# Patient Record
Sex: FEMALE | Race: WHITE
[De-identification: names, ages, dates, MRNs, and addresses within clinical notes are randomized per-mention and may not be internally consistent; named-entity substitution may affect disease eponyms.]

---

## 2017-01-27 ENCOUNTER — HOSPITAL ENCOUNTER (OUTPATIENT)
Dept: HOSPITAL 47 - RADUSWWP | Age: 57
Discharge: HOME | End: 2017-01-27
Payer: COMMERCIAL

## 2017-01-27 DIAGNOSIS — M79.89: ICD-10-CM

## 2017-01-27 DIAGNOSIS — M76.62: Primary | ICD-10-CM

## 2017-01-27 DIAGNOSIS — S86.012A: ICD-10-CM

## 2017-01-27 NOTE — US
EXAMINATION TYPE: US MSK, LT ankle, Achilles tendon

 

DATE OF EXAM: 1/27/2017 10:49 AM

 

COMPARISON: NONE

 

CLINICAL HISTORY: 56-year-old female R22.42 Localized swelling LLE,M79.672 L foot pain. Patient repor
ts a lot of walking for work. Gradual pain and burning and now with a palpable area.

 

TECHNIQUE: Multiple sonographic images of the left Achilles tendon.

 

FINDINGS:

There is fusiform thickening of the middle third Achilles tendon. There is mild thickening of the ins
ertional Achilles tendon as well though with greater degree of heterogeneity and hyperemia. Prominent
 enthesopathy is present at the insertion with mild overlying subcutaneous soft tissue swelling. Ther
e is an internal substance tear measuring approximately 1 cm long nearly involving the width of the A
chilles tendon up to 1.5 cm. There is suggestion of some superficial fiber loss as well at the Achill
es attachment.

 

No significant effusion or synovitis within the retrocalcaneal bursa. There may be prominent bony pro
tuberance at the posterior calcaneal tuberosity.

 

 

IMPRESSION:  

1. Moderate to severe insertional Achilles tendinitis with a 1 cm long intrasubstance tear and some s
hallow tearing of the superficial insertional fibers as well. Overlying soft tissue swelling.

2. Mild to moderate tendinosis of the middle third Achilles tendon.

3. Consider radiographic correlation for possible underlying Paola's deformity.

## 2017-02-10 ENCOUNTER — HOSPITAL ENCOUNTER (OUTPATIENT)
Dept: HOSPITAL 47 - RADCTMAIN | Age: 57
Discharge: HOME | End: 2017-02-10
Payer: COMMERCIAL

## 2017-02-10 DIAGNOSIS — I51.7: Primary | ICD-10-CM

## 2017-02-10 DIAGNOSIS — R59.1: ICD-10-CM

## 2017-02-10 LAB
BUN SERPL-SCNC: 13 MG/DL (ref 7–17)
NON-AFRICAN AMERICAN GFR(MDRD): >60

## 2017-02-10 PROCEDURE — 84520 ASSAY OF UREA NITROGEN: CPT

## 2017-02-10 PROCEDURE — 82565 ASSAY OF CREATININE: CPT

## 2017-02-10 PROCEDURE — 71275 CT ANGIOGRAPHY CHEST: CPT

## 2017-02-10 PROCEDURE — 36415 COLL VENOUS BLD VENIPUNCTURE: CPT

## 2017-02-10 NOTE — CT
EXAMINATION TYPE: CT angio chest

 

DATE OF EXAM: 2/10/2017 3:25 PM

 

COMPARISON: Previous study dated 6/22/2016. 

 

HISTORY: PE

 

CT DLP: 564 mGycm

Automated exposure control for dose reduction was used.

 

CONTRAST: 

CTA scan of the thorax is performed with IV Contrast, patient injected with 100 ml mL of Omnipaque 35
0, pulmonary embolism protocol. .  

 

FINDINGS:  The lungs appear clear.

 

There is bilateral axillary adenopathy. The largest lymph node on the left measures 1.7 cm in smalles
t transverse diameter. There is no significant internal mammary, mediastinal or hilar adenopathy.

 

The contrast bolus is suboptimal. No definite pulmonary embolus is seen.

 

The aorta is normal in caliber without evidence of dissection. The heart is mildly enlarged. There is
 no pleural or pericardial fluid.

 

Visualized portions of the upper abdomen are normal.

 

There is hypertrophic spondylosis within the spine.

 

IMPRESSION: 

1. THIS EXAMINATION IS NEGATIVE FOR PULMONARY EMBOLUS.

2. ENLARGED, LEFT AXILLARY LYMPH NODES. THESE HAVE PROGRESSED FROM PREVIOUS.

3. CARDIOMEGALY.

4. DEGENERATIVE CHANGES WITHIN THE SPINE.

## 2017-03-10 ENCOUNTER — HOSPITAL ENCOUNTER (OUTPATIENT)
Dept: HOSPITAL 47 - RADUSWWP | Age: 57
Discharge: HOME | End: 2017-03-10
Payer: COMMERCIAL

## 2017-03-10 DIAGNOSIS — R59.0: Primary | ICD-10-CM

## 2017-03-10 NOTE — US
EXAMINATION TYPE: US axilla extremity RT

 

DATE OF EXAM: 3/10/2017 4:07 PM

 

COMPARISON: CT Angio on PACS

 

CLINICAL HISTORY: R59.0 Localized enlarged lymph node; swelling left axilla; HX of CA in left neck ly
mph node 6 years ago; recent flu like symptoms in February with fever, chills, body aches. Left axill
a US also ordered for today.

 

Findings: prominent hypoechoic nodule measuring 1.7 x 1.4 x 1.0cm 

 

 

 

IMPRESSION:  

1. Nonspecific nodule appears to be most typical of a enlarged lymph node measuring 1.7 x 1.4 x 1.0 c
m. Correlate for lymphadenopathy.

## 2017-03-10 NOTE — US
EXAMINATION TYPE: US axilla extremity LT

 

DATE OF EXAM: 3/10/2017 4:15 PM

 

COMPARISON: CT angio on PACS

 

CLINICAL HISTORY: R59.0 Localized enlarged lymph nodes. Swelling left axilla; HX of CA left neck lymp
h node 6 years ago; recent flu like symptoms with fever, chills, body aches in February. See Right Ax
illa US today for comparison.

 

 

 

Findings: three hypoechoic nodules noted in left axilla with largest node = 3.8 x 3.5 x 1.7cm and onl
y small portion of central echogenic hilum is seen. 

 

 

 

IMPRESSION:  

1. Nonspecific hypoechoic nodules likely related to lymph nodes with the largest measuring 3.8 x 3.5 
x 1.7 cm. Correlate for lymphadenopathy.

## 2017-05-01 ENCOUNTER — HOSPITAL ENCOUNTER (INPATIENT)
Dept: HOSPITAL 47 - EC | Age: 57
LOS: 4 days | Discharge: HOME HEALTH | DRG: 175 | End: 2017-05-05
Attending: INTERNAL MEDICINE | Admitting: INTERNAL MEDICINE
Payer: COMMERCIAL

## 2017-05-01 VITALS — BODY MASS INDEX: 43.1 KG/M2

## 2017-05-01 DIAGNOSIS — Z79.84: ICD-10-CM

## 2017-05-01 DIAGNOSIS — J96.01: ICD-10-CM

## 2017-05-01 DIAGNOSIS — I10: ICD-10-CM

## 2017-05-01 DIAGNOSIS — F41.9: ICD-10-CM

## 2017-05-01 DIAGNOSIS — I26.99: Primary | ICD-10-CM

## 2017-05-01 DIAGNOSIS — E03.9: ICD-10-CM

## 2017-05-01 DIAGNOSIS — E66.01: ICD-10-CM

## 2017-05-01 DIAGNOSIS — Z91.14: ICD-10-CM

## 2017-05-01 DIAGNOSIS — R59.1: ICD-10-CM

## 2017-05-01 DIAGNOSIS — E11.9: ICD-10-CM

## 2017-05-01 DIAGNOSIS — Z88.8: ICD-10-CM

## 2017-05-01 DIAGNOSIS — Z79.899: ICD-10-CM

## 2017-05-01 DIAGNOSIS — J45.909: ICD-10-CM

## 2017-05-01 DIAGNOSIS — Z79.01: ICD-10-CM

## 2017-05-01 DIAGNOSIS — Z86.711: ICD-10-CM

## 2017-05-01 DIAGNOSIS — Z91.040: ICD-10-CM

## 2017-05-01 DIAGNOSIS — K21.9: ICD-10-CM

## 2017-05-01 DIAGNOSIS — Z85.850: ICD-10-CM

## 2017-05-01 DIAGNOSIS — D68.51: ICD-10-CM

## 2017-05-01 LAB
ALP SERPL-CCNC: 168 U/L (ref 38–126)
ALT SERPL-CCNC: 23 U/L (ref 9–52)
ANION GAP SERPL CALC-SCNC: 12 MMOL/L
APTT BLD: 21.5 SEC (ref 22–30)
AST SERPL-CCNC: 39 U/L (ref 14–36)
BASOPHILS # BLD AUTO: 0.2 K/UL (ref 0–0.2)
BASOPHILS NFR BLD AUTO: 1 %
BUN SERPL-SCNC: 14 MG/DL (ref 7–17)
CALCIUM SPEC-MCNC: 9.3 MG/DL (ref 8.4–10.2)
CH: 31.6
CHCM: 32
CHLORIDE SERPL-SCNC: 100 MMOL/L (ref 98–107)
CK SERPL-CCNC: 155 U/L (ref 30–135)
CO2 SERPL-SCNC: 24 MMOL/L (ref 22–30)
EOSINOPHIL # BLD AUTO: 0.6 K/UL (ref 0–0.7)
EOSINOPHIL NFR BLD AUTO: 3 %
ERYTHROCYTE [DISTWIDTH] IN BLOOD BY AUTOMATED COUNT: 4.1 M/UL (ref 3.8–5.4)
ERYTHROCYTE [DISTWIDTH] IN BLOOD: 16.1 % (ref 11.5–15.5)
GLUCOSE BLD-MCNC: 143 MG/DL (ref 75–99)
GLUCOSE BLD-MCNC: 150 MG/DL (ref 75–99)
GLUCOSE SERPL-MCNC: 191 MG/DL (ref 74–99)
HCT VFR BLD AUTO: 40.9 % (ref 34–46)
HDW: 2.97
HGB BLD-MCNC: 13.1 GM/DL (ref 11.4–16)
INR PPP: 1 (ref ?–1.1)
LUC NFR BLD AUTO: 3 %
LYMPHOCYTES # SPEC AUTO: 4.5 K/UL (ref 1–4.8)
LYMPHOCYTES NFR SPEC AUTO: 24 %
MAGNESIUM SPEC-SCNC: 1.5 MG/DL (ref 1.6–2.3)
MCH RBC QN AUTO: 31.8 PG (ref 25–35)
MCHC RBC AUTO-ENTMCNC: 31.9 G/DL (ref 31–37)
MCV RBC AUTO: 99.6 FL (ref 80–100)
MONOCYTES # BLD AUTO: 1.1 K/UL (ref 0–1)
MONOCYTES NFR BLD AUTO: 6 %
NEUTROPHILS # BLD AUTO: 12.1 K/UL (ref 1.3–7.7)
NEUTROPHILS NFR BLD AUTO: 63 %
NON-AFRICAN AMERICAN GFR(MDRD): >60
POTASSIUM SERPL-SCNC: 4.6 MMOL/L (ref 3.5–5.1)
PROT SERPL-MCNC: 8.5 G/DL (ref 6.3–8.2)
PT BLD: 10.6 SEC (ref 9–12)
SODIUM SERPL-SCNC: 136 MMOL/L (ref 137–145)
TROPONIN I SERPL-MCNC: 0.01 NG/ML (ref 0–0.03)
WBC # BLD AUTO: 0.59 10*3/UL
WBC # BLD AUTO: 19 K/UL (ref 3.8–10.6)
WBC (PEROX): 18.93

## 2017-05-01 PROCEDURE — 36415 COLL VENOUS BLD VENIPUNCTURE: CPT

## 2017-05-01 PROCEDURE — 80053 COMPREHEN METABOLIC PANEL: CPT

## 2017-05-01 PROCEDURE — 94640 AIRWAY INHALATION TREATMENT: CPT

## 2017-05-01 PROCEDURE — 83036 HEMOGLOBIN GLYCOSYLATED A1C: CPT

## 2017-05-01 PROCEDURE — 71020: CPT

## 2017-05-01 PROCEDURE — 83735 ASSAY OF MAGNESIUM: CPT

## 2017-05-01 PROCEDURE — 71010: CPT

## 2017-05-01 PROCEDURE — 94760 N-INVAS EAR/PLS OXIMETRY 1: CPT

## 2017-05-01 PROCEDURE — 85025 COMPLETE CBC W/AUTO DIFF WBC: CPT

## 2017-05-01 PROCEDURE — 82553 CREATINE MB FRACTION: CPT

## 2017-05-01 PROCEDURE — 85610 PROTHROMBIN TIME: CPT

## 2017-05-01 PROCEDURE — 80048 BASIC METABOLIC PNL TOTAL CA: CPT

## 2017-05-01 PROCEDURE — 81001 URINALYSIS AUTO W/SCOPE: CPT

## 2017-05-01 PROCEDURE — 93005 ELECTROCARDIOGRAM TRACING: CPT

## 2017-05-01 PROCEDURE — 84484 ASSAY OF TROPONIN QUANT: CPT

## 2017-05-01 PROCEDURE — 96366 THER/PROPH/DIAG IV INF ADDON: CPT

## 2017-05-01 PROCEDURE — 85730 THROMBOPLASTIN TIME PARTIAL: CPT

## 2017-05-01 PROCEDURE — 85027 COMPLETE CBC AUTOMATED: CPT

## 2017-05-01 PROCEDURE — 71275 CT ANGIOGRAPHY CHEST: CPT

## 2017-05-01 PROCEDURE — 96365 THER/PROPH/DIAG IV INF INIT: CPT

## 2017-05-01 PROCEDURE — 96376 TX/PRO/DX INJ SAME DRUG ADON: CPT

## 2017-05-01 PROCEDURE — 82550 ASSAY OF CK (CPK): CPT

## 2017-05-01 PROCEDURE — 87086 URINE CULTURE/COLONY COUNT: CPT

## 2017-05-01 PROCEDURE — 99291 CRITICAL CARE FIRST HOUR: CPT

## 2017-05-01 PROCEDURE — 96375 TX/PRO/DX INJ NEW DRUG ADDON: CPT

## 2017-05-01 RX ADMIN — SODIUM CHLORIDE SCH MLS/HR: 450 INJECTION, SOLUTION INTRAVENOUS at 13:37

## 2017-05-01 RX ADMIN — INSULIN LISPRO SCH UNIT: 100 INJECTION, SOLUTION INTRAVENOUS; SUBCUTANEOUS at 18:55

## 2017-05-01 RX ADMIN — HYDROMORPHONE HYDROCHLORIDE PRN MG: 1 INJECTION, SOLUTION INTRAMUSCULAR; INTRAVENOUS; SUBCUTANEOUS at 21:49

## 2017-05-01 NOTE — ED
General Adult HPI





- General


Chief complaint: Shortness of Breath


Stated complaint: chest & back pain/SOB


Time Seen by Provider: 05/01/17 13:10


Source: patient, RN notes reviewed


Mode of arrival: wheelchair


Limitations: no limitations





- History of Present Illness


Initial comments: 





This is a 56-year-old female who presents to the emergency department 

complaining of difficulty breathing and some chest pressure over the last 2 

hours.  Patient states she has a history of multiple blood clots in the lungs.  

Patient is supposed be on Xarelto but she ran out of it 5 days ago and was 

unable to get a refill because of insurance problems.  Patient states he does 

not have any fever or chills.  Patient states this was sudden onset.  Patient 

denies any calf swelling or edema however she states that the are mildly tender 

bilaterally.  Patient denies any abdominal pain patient denies nausea vomiting 

diarrhea.  Patient denies any back pain.  Patient denies any headache patient 

denies lightheadedness dizziness or near-syncopal episode.





- Related Data


 Home Medications











 Medication  Instructions  Recorded  Confirmed


 


Atenolol [Tenormin] 25 mg PO HS 02/05/15 04/30/16


 


Furosemide [Lasix] 40 mg PO QAM 02/05/15 04/30/16


 


Levothyroxine Sodium [Synthroid] 175 mcg PO QAM 02/05/15 04/30/16


 


Meloxicam 15 mg PO HS 02/05/15 04/30/16


 


Potassium Chloride [Klor-Con 10] 10 meq PO QAM 02/05/15 04/30/16


 


Ventolin Inhaler (Dose Unknown) 2 puff INHALATION Q4-6H PRN 02/05/15 04/30/16


 


Warfarin Sodium [Coumadin] 5 mg PO HS 02/05/15 04/30/16


 


rOPINIRole HCL [Requip] 2 mg PO HS 02/05/15 04/30/16








 Previous Rx's











 Medication  Instructions  Recorded


 


Acetaminophen-Codeine 300-30mg 1 each PO Q4H PRN #12 tablet 04/30/16





[Tylenol #3]  











 Allergies











Allergy/AdvReac Type Severity Reaction Status Date / Time


 


latex Allergy  Rash/Hives Verified 04/30/16 12:04














Review of Systems


ROS Statement: 


Those systems with pertinent positive or pertinent negative responses have been 

documented in the HPI.





ROS Other: All systems not noted in ROS Statement are negative.





Past Medical History


Past Medical History: Asthma, Cancer, Chest Pain / Angina, Deep Vein Thrombosis 

(DVT), GERD/Reflux, Hypertension, Pneumonia, Pulmonary Embolus (PE), Skin 

Disorder


Additional Past Medical History / Comment(s): blood in stool, ca-thyroid, pe,pna


History of Any Multi-Drug Resistant Organisms: None Reported


Past Surgical History: Breast Surgery, Hysterectomy


Additional Past Surgical History / Comment(s): thyroid removed, spleenectomy, 

lumps removed in breast


Past Anesthesia/Blood Transfusion Reactions: No Reported Reaction


Past Psychological History: Anxiety


Smoking Status: Never smoker


Past Alcohol Use History: None Reported


Past Drug Use History: None Reported





- Past Family History


  ** mother


Family Medical History: Cancer


Additional Family Medical History / Comment(s): colon





  ** brother


Family Medical History: Cancer


Additional Family Medical History / Comment(s): liver, kidneys, pancreas, clots





  ** sister


Family Medical History: Cancer


Additional Family Medical History / Comment(s): breast ca, clots





General Exam





- General Exam Comments


Initial Comments: 





GENERAL:


Patient is well-developed and well-nourished.  Patient is nontoxic and well-

hydrated and is in mild distress.





ENT:


Neck is soft and supple.  No significant lymphadenopathy is noted.  Oropharynx 

is clear.  Moist mucous membranes.  Neck has full range of motion without 

eliciting any pain. 





EYES:


The sclera were anicteric and conjunctiva were pink and moist.  Extraocular 

movements were intact and pupils were equal round and reactive to light.  

Eyelids were unremarkable.





PULMONARY:


Unlabored respirations.  Good breath sounds bilaterally.  No audible rales 

rhonchi or wheezing was noted.





CARDIOVASCULAR:


There is a regular rate and rhythm without any murmurs gallops or rubs.  





ABDOMEN:


Soft and nontender with normal bowel sounds.  No palpable organomegaly was 

noted.  There is no palpable pulsatile mass.





SKIN:


Skin is clear with no lesions or rashes and otherwise unremarkable.





NEUROLOGIC:


Patient is alert and oriented x3.  Cranial nerves II through XII are grossly 

intact.  Motor and sensory are also intact.  Normal speech, volume and content.

  Symmetrical smile. 





MUSCULOSKELETAL:


Normal extremities with adequate strength and full range of motion.  No lower 

extremity swelling or edema.  Mild calf tenderness





LYMPHATICS:


No significant lymphadenopathy is noted





PSYCHIATRIC:


Normal psychiatric evaluation.  Normal interpersonal interactions appears 

functionally intact in deals appropriately with others.  No signs of 

depression.  No signs of anxiety. 


Limitations: no limitations





Course


 Vital Signs











  05/01/17 05/01/17 05/01/17





  13:07 13:39 14:11


 


Temperature 97 F L  


 


Pulse Rate 142 H 130 H 136 H


 


Respiratory 26 H 24 24





Rate   


 


Blood Pressure 159/103 122/78 119/71


 


O2 Sat by Pulse 87 L 93 L 90 L





Oximetry   














Medical Decision Making





- Medical Decision Making





EKG shows sinus tachycardia at 139 bpm [place] interval is on a 42 QRS is 88 QTC is 

426 QTC is 480.  Patient's EKG shows no significant ST segment elevation or 

depression.





Because of the patient's history and clinical presentation started the patient 

on heparin immediately and ordered CAT scan immediately.





CAT scan shows multiple clots larger on the right side.





I spoke with Dr. Inman he agreed to admit the patient and wrote admitting 

orders and continue the heparin on the floor.  I put the patient in the ICU I 

spoke with Dr. Westfall he agreed to see the patient the ICU for critical





- Lab Data


Result diagrams: 


 05/01/17 13:20





 05/01/17 13:20


 Lab Results











  05/01/17 05/01/17 05/01/17 Range/Units





  13:20 13:20 13:20 


 


WBC   19.0 H   (3.8-10.6)  k/uL


 


RBC   4.10   (3.80-5.40)  m/uL


 


Hgb   13.1   (11.4-16.0)  gm/dL


 


Hct   40.9   (34.0-46.0)  %


 


MCV   99.6   (80.0-100.0)  fL


 


MCH   31.8   (25.0-35.0)  pg


 


MCHC   31.9   (31.0-37.0)  g/dL


 


RDW   16.1 H   (11.5-15.5)  %


 


Plt Count   291   (150-450)  k/uL


 


Neutrophils %   63   %


 


Lymphocytes %   24   %


 


Monocytes %   6   %


 


Eosinophils %   3   %


 


Basophils %   1   %


 


Neutrophils #   12.1 H   (1.3-7.7)  k/uL


 


Lymphocytes #   4.5   (1.0-4.8)  k/uL


 


Monocytes #   1.1 H   (0-1.0)  k/uL


 


Eosinophils #   0.6   (0-0.7)  k/uL


 


Basophils #   0.2   (0-0.2)  k/uL


 


Hypochromasia   Slight   


 


Anisocytosis   Slight   


 


Macrocytosis   Slight   


 


PT     (9.0-12.0)  sec


 


INR     (<1.1)  


 


APTT     (22.0-30.0)  sec


 


Sodium    136 L  (137-145)  mmol/L


 


Potassium    4.6  (3.5-5.1)  mmol/L


 


Chloride    100  ()  mmol/L


 


Carbon Dioxide    24  (22-30)  mmol/L


 


Anion Gap    12  mmol/L


 


BUN    14  (7-17)  mg/dL


 


Creatinine    0.55  (0.52-1.04)  mg/dL


 


Est GFR (MDRD) Af Amer    >60  (>60 ml/min/1.73 sqM)  


 


Est GFR (MDRD) Non-Af    >60  (>60 ml/min/1.73 sqM)  


 


Glucose    191 H  (74-99)  mg/dL


 


Calcium    9.3  (8.4-10.2)  mg/dL


 


Magnesium    1.5 L  (1.6-2.3)  mg/dL


 


Total Bilirubin    1.0  (0.2-1.3)  mg/dL


 


AST    39 H  (14-36)  U/L


 


ALT    23  (9-52)  U/L


 


Alkaline Phosphatase    168 H  ()  U/L


 


Total Creatine Kinase  155 H    ()  U/L


 


CK-MB (CK-2)  0.4    (0.0-2.4)  ng/mL


 


CK-MB (CK-2) Rel Index  0.3    


 


Troponin I  0.015    (0.000-0.034)  ng/mL


 


Total Protein    8.5 H  (6.3-8.2)  g/dL


 


Albumin    4.1  (3.5-5.0)  g/dL














  05/01/17 Range/Units





  13:20 


 


WBC   (3.8-10.6)  k/uL


 


RBC   (3.80-5.40)  m/uL


 


Hgb   (11.4-16.0)  gm/dL


 


Hct   (34.0-46.0)  %


 


MCV   (80.0-100.0)  fL


 


MCH   (25.0-35.0)  pg


 


MCHC   (31.0-37.0)  g/dL


 


RDW   (11.5-15.5)  %


 


Plt Count   (150-450)  k/uL


 


Neutrophils %   %


 


Lymphocytes %   %


 


Monocytes %   %


 


Eosinophils %   %


 


Basophils %   %


 


Neutrophils #   (1.3-7.7)  k/uL


 


Lymphocytes #   (1.0-4.8)  k/uL


 


Monocytes #   (0-1.0)  k/uL


 


Eosinophils #   (0-0.7)  k/uL


 


Basophils #   (0-0.2)  k/uL


 


Hypochromasia   


 


Anisocytosis   


 


Macrocytosis   


 


PT  10.6  (9.0-12.0)  sec


 


INR  1.0  (<1.1)  


 


APTT  21.5 L  (22.0-30.0)  sec


 


Sodium   (137-145)  mmol/L


 


Potassium   (3.5-5.1)  mmol/L


 


Chloride   ()  mmol/L


 


Carbon Dioxide   (22-30)  mmol/L


 


Anion Gap   mmol/L


 


BUN   (7-17)  mg/dL


 


Creatinine   (0.52-1.04)  mg/dL


 


Est GFR (MDRD) Af Amer   (>60 ml/min/1.73 sqM)  


 


Est GFR (MDRD) Non-Af   (>60 ml/min/1.73 sqM)  


 


Glucose   (74-99)  mg/dL


 


Calcium   (8.4-10.2)  mg/dL


 


Magnesium   (1.6-2.3)  mg/dL


 


Total Bilirubin   (0.2-1.3)  mg/dL


 


AST   (14-36)  U/L


 


ALT   (9-52)  U/L


 


Alkaline Phosphatase   ()  U/L


 


Total Creatine Kinase   ()  U/L


 


CK-MB (CK-2)   (0.0-2.4)  ng/mL


 


CK-MB (CK-2) Rel Index   


 


Troponin I   (0.000-0.034)  ng/mL


 


Total Protein   (6.3-8.2)  g/dL


 


Albumin   (3.5-5.0)  g/dL














Critical Care Time


Critical Care Time: Yes


Total Critical Care Time: 35





Disposition


Clinical Impression: 


 Pulmonary embolism





Disposition: ADMITTED AS IP TO THIS [facility]


Time of Disposition: 14:27

## 2017-05-01 NOTE — XR
EXAMINATION TYPE: XR chest 2V

 

DATE OF EXAM: 5/1/2017 3:28 PM

 

HISTORY: Chest Pain.

 

REFERENCE: Previous study dated 1/13/2015 as well as a CT scan of the chest performed earlier today..


 

FINDINGS: The heart is at the upper limits of normal in size. There is mild vascular congestion. The 
lungs appear clear. Pleural spaces are clear.

 

IMPRESSION: 

1. BORDERLINE CARDIOMEGALY.

2. MILD VASCULAR CONGESTION WITHOUT SOUMYA EDEMA.

## 2017-05-01 NOTE — CT
EXAMINATION TYPE: CT chest angio for PE

 

DATE OF EXAM: 5/1/2017 2:18 PM

 

COMPARISON: CTA chest February 10, 2017

 

HISTORY: Patient complains of chest/back pain and severe difficulty breathing, worse when laying down
.

 

CT DLP: 621.5 mGycm. Automated Exposure Control for Dose Reduction was Utilized.

 

 

CONTRAST: 

CTA scan of the thorax is performed with IV Contrast, patient injected with 100 mL of Omnipaque 350, 
pulmonary embolism protocol.  MIP Images are created on CT scanner and reviewed.

 

FINDINGS:

 

LUNGS: Respiratory motion artifact degradation is seen making evaluation slightly suboptimal. No conc
erning parenchymal nodule or mass is present bilaterally. There is no pleural effusion or pneumothora
x seen bilaterally.  The tracheobronchial tree is patent.

 

MEDIASTINUM: There is satisfactory enhancement of the pulmonary artery and its branches, there is is 
new large pulmonary embolism beginning in the distal right pulmonary artery with lobar and segmental 
extension into the right lower lobe. Near-complete occlusion is felt present. There is lobar extensio
n into the right middle lobe seen on axial image 73. There are additional segmental thrombi in the ri
ght upper lobe for reference axial image 54 with subsegmental extension. There is segmental thrombus 
in the left upper lobe on axial image 60 with subsegmental extension. There is segmental thrombus in 
the lingula beginning on axial image 72 with subsegmental extension. There is partially occlusive lob
ar thrombus left lower lobe with segmental and subsegmental extension beginning on axial image 73. Th
ere are no greater than 1 cm hilar or mediastinal lymph nodes.   No cardiomegaly or pericardial effus
ion is seen. Moderate right atrial dilatation is redemonstrated. RV /LV ratio is now greater than 1, 
new finding from prior study. Main pulmonary artery measures 2.9 cm in diameter on axial image 60 fel
t stable from prior study. Adjacent ascending aorta measures 3.0 cm in diameter.

 

OTHER: Surgical clips epigastric region just below diaphragm are redemonstrated. There is straighteni
ng of spine with multilevel spurring seen.

 

IMPRESSION: 

1. New bilateral significant pulmonary embolism with CT evidence suggesting new RV strain.

 

Case reviewed with ordering ER physician at time of dictation.

## 2017-05-02 LAB
ANION GAP SERPL CALC-SCNC: 7 MMOL/L
BUN SERPL-SCNC: 10 MG/DL (ref 7–17)
CALCIUM SPEC-MCNC: 8.9 MG/DL (ref 8.4–10.2)
CH: 31.3
CHCM: 31.1
CHLORIDE SERPL-SCNC: 103 MMOL/L (ref 98–107)
CO2 SERPL-SCNC: 31 MMOL/L (ref 22–30)
ERYTHROCYTE [DISTWIDTH] IN BLOOD BY AUTOMATED COUNT: 3.77 M/UL (ref 3.8–5.4)
ERYTHROCYTE [DISTWIDTH] IN BLOOD: 16.1 % (ref 11.5–15.5)
GLUCOSE BLD-MCNC: 105 MG/DL (ref 75–99)
GLUCOSE BLD-MCNC: 115 MG/DL (ref 75–99)
GLUCOSE BLD-MCNC: 119 MG/DL (ref 75–99)
GLUCOSE BLD-MCNC: 145 MG/DL (ref 75–99)
GLUCOSE BLD-MCNC: 157 MG/DL (ref 75–99)
GLUCOSE BLD-MCNC: 176 MG/DL (ref 75–99)
GLUCOSE SERPL-MCNC: 173 MG/DL (ref 74–99)
HCT VFR BLD AUTO: 38.2 % (ref 34–46)
HDW: 2.94
HEMOGLOBIN A1C: 7.8 % (ref 4.2–6.1)
HGB BLD-MCNC: 12 GM/DL (ref 11.4–16)
MAGNESIUM SPEC-SCNC: 1.9 MG/DL (ref 1.6–2.3)
MCH RBC QN AUTO: 31.9 PG (ref 25–35)
MCHC RBC AUTO-ENTMCNC: 31.4 G/DL (ref 31–37)
MCV RBC AUTO: 101.4 FL (ref 80–100)
NON-AFRICAN AMERICAN GFR(MDRD): >60
PARTICLE COUNT: 3842
PH UR: 5.5 [PH] (ref 5–8)
POTASSIUM SERPL-SCNC: 4 MMOL/L (ref 3.5–5.1)
RBC UR QL: <1 /HPF (ref 0–5)
SODIUM SERPL-SCNC: 141 MMOL/L (ref 137–145)
SP GR UR: 1.01 (ref 1–1.03)
SQUAMOUS UR QL AUTO: 1 /HPF (ref 0–4)
UA BILLING (MACRO VS. MICRO): (no result)
UROBILINOGEN UR QL STRIP: <2 MG/DL (ref ?–2)
WBC # BLD AUTO: 17.2 K/UL (ref 3.8–10.6)
WBC #/AREA URNS HPF: 1 /HPF (ref 0–5)

## 2017-05-02 RX ADMIN — RIVAROXABAN SCH MG: 15 TABLET, FILM COATED ORAL at 17:14

## 2017-05-02 RX ADMIN — MAGNESIUM SULFATE IN DEXTROSE SCH MLS/HR: 10 INJECTION, SOLUTION INTRAVENOUS at 10:07

## 2017-05-02 RX ADMIN — ATENOLOL SCH MG: 25 TABLET ORAL at 20:04

## 2017-05-02 RX ADMIN — INSULIN LISPRO SCH UNIT: 100 INJECTION, SOLUTION INTRAVENOUS; SUBCUTANEOUS at 08:25

## 2017-05-02 RX ADMIN — LEVOTHYROXINE SODIUM SCH MCG: 100 TABLET ORAL at 13:47

## 2017-05-02 RX ADMIN — HYDROMORPHONE HYDROCHLORIDE PRN MG: 1 INJECTION, SOLUTION INTRAMUSCULAR; INTRAVENOUS; SUBCUTANEOUS at 02:30

## 2017-05-02 RX ADMIN — FUROSEMIDE SCH MG: 40 TABLET ORAL at 13:47

## 2017-05-02 RX ADMIN — INSULIN LISPRO SCH: 100 INJECTION, SOLUTION INTRAVENOUS; SUBCUTANEOUS at 17:13

## 2017-05-02 RX ADMIN — ACETAMINOPHEN PRN MG: 325 TABLET, FILM COATED ORAL at 22:22

## 2017-05-02 RX ADMIN — SODIUM CHLORIDE SCH MLS/HR: 450 INJECTION, SOLUTION INTRAVENOUS at 00:34

## 2017-05-02 RX ADMIN — LEVOTHYROXINE SODIUM SCH MCG: 75 TABLET ORAL at 13:47

## 2017-05-02 RX ADMIN — ESCITALOPRAM SCH MG: 5 TABLET, FILM COATED ORAL at 20:05

## 2017-05-02 RX ADMIN — INSULIN LISPRO SCH: 100 INJECTION, SOLUTION INTRAVENOUS; SUBCUTANEOUS at 13:06

## 2017-05-02 RX ADMIN — MAGNESIUM SULFATE IN DEXTROSE SCH MLS/HR: 10 INJECTION, SOLUTION INTRAVENOUS at 06:48

## 2017-05-02 RX ADMIN — SODIUM CHLORIDE SCH MLS/HR: 450 INJECTION, SOLUTION INTRAVENOUS at 13:48

## 2017-05-02 RX ADMIN — ACETAMINOPHEN PRN MG: 325 TABLET, FILM COATED ORAL at 12:11

## 2017-05-02 RX ADMIN — INSULIN LISPRO SCH: 100 INJECTION, SOLUTION INTRAVENOUS; SUBCUTANEOUS at 21:18

## 2017-05-02 RX ADMIN — HYDROMORPHONE HYDROCHLORIDE PRN MG: 1 INJECTION, SOLUTION INTRAMUSCULAR; INTRAVENOUS; SUBCUTANEOUS at 06:41

## 2017-05-02 NOTE — P.HPIM
History of Present Illness


H&P Date: 05/02/17





56-year-old female with history of multiple venous thromboembolism's wasn't 

being maintained on Xarelto comes in the hospital with progressive worsening of 

breathing difficulty over the last few days.





Over the last 2 weeks patient has been taking lower doses of xarelto 20 

milligrams every other day and a week ago she ran out of her medications due to 

loss of insurance coverage.





Patient was seen in the emergency room underwent a CT angiogram was noted to 

have bilateral pulmonary embolism with some RV collapse.





Patient was started on supplemental oxygen and triaged to the ICU Today patient 

was seen at bedside states that she does not have significant breathing 

difficulty at rest currently on 4-5 L of supplemental oxygen





Denies having headaches blurry vision nausea vomiting diarrhea urinary urgency 

or frequency.





Of note patient recently underwent a left axillary lymph node dissection 

apparently was negative for cancer





Patient was informed that she has factor V Leiden deficiency





Review of Systems


All systems: negative (In HPI)





Past Medical History


Past Medical History: Asthma, Cancer, Chest Pain / Angina, Deep Vein Thrombosis 

(DVT), GERD/Reflux, Hypertension, Pneumonia, Pulmonary Embolus (PE), Skin 

Disorder


Additional Past Medical History / Comment(s): blood in stool, ca-thyroid, pe,pna


History of Any Multi-Drug Resistant Organisms: None Reported


Past Surgical History: Breast Surgery, Hysterectomy


Additional Past Surgical History / Comment(s): thyroid removed, spleenectomy, 

lumps removed in breast


Past Anesthesia/Blood Transfusion Reactions: No Reported Reaction


Past Psychological History: Anxiety


Smoking Status: Never smoker


Past Alcohol Use History: None Reported


Past Drug Use History: None Reported





- Past Family History


  ** mother


Family Medical History: Cancer


Additional Family Medical History / Comment(s): colon





  ** brother


Family Medical History: Cancer


Additional Family Medical History / Comment(s): liver, kidneys, pancreas, clots





  ** sister


Family Medical History: Cancer


Additional Family Medical History / Comment(s): breast ca, clots





Medications and Allergies


 Home Medications











 Medication  Instructions  Recorded  Confirmed  Type


 


Atenolol [Tenormin] 25 mg PO HS 02/05/15 05/01/17 History


 


Furosemide [Lasix] 40 mg PO QAM 02/05/15 05/01/17 History


 


Levothyroxine Sodium [Synthroid] 175 mcg PO QAM 02/05/15 05/01/17 History


 


Potassium Chloride [Klor-Con 10] 10 meq PO QAM 02/05/15 05/01/17 History


 


Albuterol Inhaler [Ventolin Hfa 2 puff INHALATION RT-Q4H PRN 05/01/17 05/01/17 

History





Inhaler]    


 


Escitalopram [Lexapro] 5 mg PO DAILY 05/01/17 05/01/17 History


 


Insulin Degludec [Tresiba 10 unit SQ DAILY 05/01/17 05/01/17 History





Flextouch U-100]    


 


Naproxen Sodium [Naproxen Sodium 550 mg PO BID 05/01/17 05/01/17 History





DS]    


 


Omeprazole Magnesium 20.6mg 1 cap PO DAILY 05/01/17 05/01/17 History


 


Rivaroxaban [Xarelto] 20 mg PO DAILY 05/01/17 05/01/17 History


 


metFORMIN HCL 1,000 mg PO BID 05/01/17 05/01/17 History


 


traZODone HCL 50 mg PO HS PRN 05/01/17 05/01/17 History











 Allergies











Allergy/AdvReac Type Severity Reaction Status Date / Time


 


latex Allergy  Rash/Hives Verified 05/01/17 14:44


 


sitagliptin [From Januvia] Allergy  Rash/Hives Verified 05/01/17 14:44














Physical Exam


Vitals: 


 Vital Signs











  Temp Pulse Pulse Resp BP Pulse Ox


 


 05/02/17 13:30   105 H   28 H  123/89  95


 


 05/02/17 13:00   100   23  109/53  95


 


 05/02/17 12:30   96   18  111/63  94 L


 


 05/02/17 12:00  98.2 F  99  113 H  20  126/81  93 L


 


 05/02/17 11:30   112 H   22  117/71  90 L


 


 05/02/17 11:00   107 H   13  142/94  92 L


 


 05/02/17 10:30   105 H   23  115/90  94 L


 


 05/02/17 10:00   111 H   22  155/95  94 L


 


 05/02/17 09:30   107 H   17  131/75  94 L


 


 05/02/17 09:00   116 H   23  118/79  93 L


 


 05/02/17 08:30   108 H   23  102/70  94 L


 


 05/02/17 08:00  98.2 F  104 H  113 H  15  119/82  93 L


 


 05/02/17 07:30   106 H   15  125/65  93 L


 


 05/02/17 07:00   104 H   15  115/62  94 L


 


 05/02/17 06:30   111 H   24  113/67  94 L


 


 05/02/17 06:00   133 H   36 H  123/71  90 L


 


 05/02/17 05:30   110 H   16  141/74  94 L


 


 05/02/17 05:00   116 H   17  141/70  91 L


 


 05/02/17 04:30   112 H   15  118/77  91 L


 


 05/02/17 04:00   112 H  113 H  14  135/72  92 L


 


 05/02/17 03:30   114 H   15  119/77  91 L


 


 05/02/17 03:00   109 H   15  139/80  90 L


 


 05/02/17 02:30   115 H   25 H  141/87  92 L


 


 05/02/17 02:00   128 H   19  118/64  95


 


 05/02/17 01:30   117 H   14  125/64  93 L


 


 05/02/17 01:00   118 H   14  123/75  93 L


 


 05/02/17 00:30   115 H   13  138/65  94 L


 


 05/02/17 00:00   120 H  112 H  37 H  140/75  95


 


 05/01/17 23:30   113 H   15  131/63  93 L


 


 05/01/17 23:00   111 H   17  116/80  92 L


 


 05/01/17 22:30   112 H   19  133/76 


 


 05/01/17 22:00   114 H   18  133/76 


 


 05/01/17 21:30     24  133/76 


 


 05/01/17 21:12  98.2 F   112 H  21  


 


 05/01/17 21:01   76    


 


 05/01/17 20:45  98.5 F  126 H   20  107/74  92 L


 


 05/01/17 19:49   125 H   20  113/78  91 L


 


 05/01/17 19:22   93   20   89 L


 


 05/01/17 19:00   134 H   22   97


 


 05/01/17 18:30   135 H  135 H  20  116/63  83 L


 


 05/01/17 17:30  98.2 F  145 H  140 H  22  112/71  90 L


 


 05/01/17 16:30  98.8 F  133 H  130 H  18  119/58  91 L


 


 05/01/17 15:50  100.2 F H  131 H   22  106/65  94 L








 Intake and Output











 05/02/17 05/02/17 05/02/17





 06:59 14:59 22:59


 


Intake Total 624.828 640 


 


Output Total 500 350 


 


Balance 124.828 290 


 


Intake:   


 


   140 


 


    0.9 160 140 


 


  Intake, IV Titration 464.828 500 





  Amount   


 


    Heparin Sodium,Porcine/ 464.828 500 





    D5w Pmx 25,000 unit In   





    Dextrose/Water 1 500ml.   





    bag @ 18 UNITS/KG/HR 42.   





    45 mls/hr IV .[id number] MARY   





    Rx#:[id number]   


 


Output:   


 


  Urine 500 350 


 


Other:   


 


  Voiding Method Bedpan Bedpan 


 


  # Voids 1  


 


  Weight 111 kg  











Physical exam Gen. appearance oriented 3 in no distress





Neck is supple no JVD





Lungs good air entry clear to auscultation no rhonchi or wheezing





Heart S1-S2 heard regular rate and rhythm no murmurs appreciated 





Abdomen is soft nontender no organomegaly bowel sounds are intact





Neurologically cranial nerves II-12 grossly intact no focal motor or sensory 

deficits noted





Skin no abnormalities appreciated








Results


CBC & Chem 7: 


 05/02/17 04:53





 05/02/17 04:53


Labs: 


 Abnormal Lab Results - Last 24 Hours (Table)











  05/01/17 05/01/17 05/01/17 Range/Units





  18:47 19:54 23:02 


 


WBC     (3.8-10.6)  k/uL


 


RBC     (3.80-5.40)  m/uL


 


MCV     (80.0-100.0)  fL


 


RDW     (11.5-15.5)  %


 


APTT   74.4 H   (22.0-30.0)  sec


 


Carbon Dioxide     (22-30)  mmol/L


 


Glucose     (74-99)  mg/dL


 


POC Glucose (mg/dL)  150 H   143 H  (75-99)  mg/dL


 


Hemoglobin A1c     (4.2-6.1)  %


 


Urine Blood     (Negative)  


 


Urine Bacteria     (None)  /hpf


 


Urine Mucus     (None)  /hpf














  05/02/17 05/02/17 05/02/17 Range/Units





  04:53 04:53 04:53 


 


WBC    17.2 H  (3.8-10.6)  k/uL


 


RBC    3.77 L  (3.80-5.40)  m/uL


 


MCV    101.4 H  (80.0-100.0)  fL


 


RDW    16.1 H  (11.5-15.5)  %


 


APTT   65.5 H   (22.0-30.0)  sec


 


Carbon Dioxide     (22-30)  mmol/L


 


Glucose     (74-99)  mg/dL


 


POC Glucose (mg/dL)     (75-99)  mg/dL


 


Hemoglobin A1c  7.8 H    (4.2-6.1)  %


 


Urine Blood     (Negative)  


 


Urine Bacteria     (None)  /hpf


 


Urine Mucus     (None)  /hpf














  05/02/17 05/02/17 05/02/17 Range/Units





  04:53 06:00 07:28 


 


WBC     (3.8-10.6)  k/uL


 


RBC     (3.80-5.40)  m/uL


 


MCV     (80.0-100.0)  fL


 


RDW     (11.5-15.5)  %


 


APTT     (22.0-30.0)  sec


 


Carbon Dioxide  31 H    (22-30)  mmol/L


 


Glucose  173 H    (74-99)  mg/dL


 


POC Glucose (mg/dL)    157 H  (75-99)  mg/dL


 


Hemoglobin A1c     (4.2-6.1)  %


 


Urine Blood   Trace H   (Negative)  


 


Urine Bacteria   Rare H   (None)  /hpf


 


Urine Mucus   Rare H   (None)  /hpf














  05/02/17 05/02/17 05/02/17 Range/Units





  08:22 11:48 13:05 


 


WBC     (3.8-10.6)  k/uL


 


RBC     (3.80-5.40)  m/uL


 


MCV     (80.0-100.0)  fL


 


RDW     (11.5-15.5)  %


 


APTT     (22.0-30.0)  sec


 


Carbon Dioxide     (22-30)  mmol/L


 


Glucose     (74-99)  mg/dL


 


POC Glucose (mg/dL)  176 H  145 H  119 H  (75-99)  mg/dL


 


Hemoglobin A1c     (4.2-6.1)  %


 


Urine Blood     (Negative)  


 


Urine Bacteria     (None)  /hpf


 


Urine Mucus     (None)  /hpf








 Microbiology - Last 24 Hours (Table)











 05/02/17 06:00 Urine Culture - Preliminary





 Urine,Voided 














Thrombosis Risk Factor Assmnt





- Choose All That Apply


Any of the Below Risk Factors Present?: Yes


Each Factor Represents 1 point: Age 41-60 years


Each Risk Factor Represents 3 Points: Family history of DVT/PE, History of DVT/

PE


Thrombosis Risk Factor Assessment Total Risk Factor Score: 7


Thrombosis Risk Factor Assessment Level: High Risk





Assessment and Plan


Plan: 


#1 recurrent pulmonary embolism bilateral in a patient with factor V Leiden 

deficiency due to noncompliance this is due to loss of insurance





#2 hypothyroidism.  #3 obesity





#4 hypertension





#5 diabetes mellitus type 2 maintained on metformin





#6 hypoxic respiratory failure secondary to #1





#7 leukocytosis is likely reactive in nature





Plan





DC IV heparin.  We'll start patient on Xarelto 15 mg by mouth twice a day, 

apparently has regained her insurance coverage will send for preapproval 

patient has tried Coumadin in the past and has failed and has had a DVT on 

Coumadin hence patient would benefit from restarting his overall to





Titrate down oxygen as tolerated patient will need inpatient care till titrated 

of oxygen at rest at least





Medications were reconciled glucose levels are appropriate pulmonary 

recommendations are appreciated triaged out of intensive care unit

## 2017-05-02 NOTE — P.CNPUL
History of Present Illness


Consult date: 05/02/17


Requesting physician: Gustavo Noriega


Reason for consult: other (Pulmonary embolism)


Chief complaint: Shortness of breath


History of present illness: 


This is a 56-year-old female with history of hypercoagulable state, patient was 

told in the past that she had a genetic defect, I believe this may have been 

factor V Leyden deficiency.  And she was told by many physicians in the past 

that she needs to be on anticoagulation therapy for the rest of her life.  

Going back to the patient's history, patient had 1 episode of DVT and pulmonary 

embolism after a knee surgery over 13 years ago.  Patient took Coumadin for 6 

months, and she was eventually taken off Coumadin.  Couple years back, patient 

had an episode of unprovoked pulmonary embolism, and she was advised to take 

Xarelto, lifetime.  For some reason patient stopped Xarelto mostly because of 

the cost, and she couldn't afford it.  Insurance company would not cover her 

Xarelto.  Neck to mention the patient had so many issues with Coumadin in the 

past, and she could not keep pro time in the normal range.  Patient presented 

this time with acute shortness of breath, CT of the chest showed bilateral 

pulmonary emboli, with some right ventricular strain, patient was 

hemodynamically stable, and blood pressure was maintained within a normal 

range.  Hence the patient was given heparin, did not require thrombolytic 

therapy.  She was admitted to the intensive care unit, and I saw today on 

consultation.  She seems to be doing better, breathing easier, no cough no 

wheezing no shortness of breath no chest pain.  Patient is also known to have 

history of possible thyroid cancer, she had some cancer tissue removed from her 

neck, but she could not tell me whether it was thyroid or parathyroid, this was 

over 4 years ago.  She also had some recent lymph nodes removed by Dr. Lorenzo 

and she was told they were benign from her axillary region.  Patient states 

that she has strong family history of thromboembolic disease and 

hypercoagulable state.  She believes that it is most likely factor V Leyden 

deficiency.








Review of Systems





14 point review of systems were obtained, please refer to pertinent positives 

and negatives in HPI.





Past Medical History


Past Medical History: Asthma, Cancer, Chest Pain / Angina, Deep Vein Thrombosis 

(DVT), GERD/Reflux, Hypertension, Pneumonia, Pulmonary Embolus (PE), Skin 

Disorder


Additional Past Medical History / Comment(s): blood in stool, ca-thyroid, pe,pna


History of Any Multi-Drug Resistant Organisms: None Reported


Past Surgical History: Breast Surgery, Hysterectomy


Additional Past Surgical History / Comment(s): thyroid removed, spleenectomy, 

lumps removed in breast


Past Anesthesia/Blood Transfusion Reactions: No Reported Reaction


Past Psychological History: Anxiety


Smoking Status: Never smoker


Past Alcohol Use History: None Reported


Past Drug Use History: None Reported





- Past Family History


  ** mother


Family Medical History: Cancer


Additional Family Medical History / Comment(s): colon





  ** brother


Family Medical History: Cancer


Additional Family Medical History / Comment(s): liver, kidneys, pancreas, clots





  ** sister


Family Medical History: Cancer


Additional Family Medical History / Comment(s): breast ca, clots





Medications and Allergies


 Home Medications











 Medication  Instructions  Recorded  Confirmed  Type


 


Atenolol [Tenormin] 25 mg PO HS 02/05/15 05/01/17 History


 


Furosemide [Lasix] 40 mg PO QAM 02/05/15 05/01/17 History


 


Levothyroxine Sodium [Synthroid] 175 mcg PO QAM 02/05/15 05/01/17 History


 


Potassium Chloride [Klor-Con 10] 10 meq PO QAM 02/05/15 05/01/17 History


 


Albuterol Inhaler [Ventolin Hfa 2 puff INHALATION RT-Q4H PRN 05/01/17 05/01/17 

History





Inhaler]    


 


Escitalopram [Lexapro] 5 mg PO DAILY 05/01/17 05/01/17 History


 


Insulin Degludec [Tresiba 10 unit SQ DAILY 05/01/17 05/01/17 History





Flextouch U-100]    


 


Naproxen Sodium [Naproxen Sodium 550 mg PO BID 05/01/17 05/01/17 History





DS]    


 


Omeprazole Magnesium 20.6mg 1 cap PO DAILY 05/01/17 05/01/17 History


 


Rivaroxaban [Xarelto] 20 mg PO DAILY 05/01/17 05/01/17 History


 


metFORMIN HCL 1,000 mg PO BID 05/01/17 05/01/17 History


 


traZODone HCL 50 mg PO HS PRN 05/01/17 05/01/17 History











 Allergies











Allergy/AdvReac Type Severity Reaction Status Date / Time


 


latex Allergy  Rash/Hives Verified 05/01/17 14:44


 


sitagliptin [From Januvia] Allergy  Rash/Hives Verified 05/01/17 14:44














Physical Exam


Vitals: 


 Vital Signs











  Temp Pulse Pulse Resp BP Pulse Ox


 


 05/02/17 16:00  97.5 F L  90   23  130/74  94 L


 


 05/02/17 15:30   98   21  129/74  94 L


 


 05/02/17 15:29    113 H  18  


 


 05/02/17 15:00   101 H   18  144/82  94 L


 


 05/02/17 14:00   111 H   21  138/87  92 L


 


 05/02/17 13:30   105 H   28 H  123/89  95


 


 05/02/17 13:00   100   23  109/53  95


 


 05/02/17 12:30   96   18  111/63  94 L


 


 05/02/17 12:00  98.2 F  99  113 H  20  126/81  93 L


 


 05/02/17 11:30   112 H   22  117/71  90 L


 


 05/02/17 11:00   107 H   13  142/94  92 L


 


 05/02/17 10:30   105 H   23  115/90  94 L


 


 05/02/17 10:00   111 H   22  155/95  94 L


 


 05/02/17 09:30   107 H   17  131/75  94 L


 


 05/02/17 09:00   116 H   23  118/79  93 L


 


 05/02/17 08:30   108 H   23  102/70  94 L


 


 05/02/17 08:00  98.2 F  104 H  113 H  15  119/82  93 L


 


 05/02/17 07:30   106 H   15  125/65  93 L


 


 05/02/17 07:00   104 H   15  115/62  94 L


 


 05/02/17 06:30   111 H   24  113/67  94 L


 


 05/02/17 06:00   133 H   36 H  123/71  90 L


 


 05/02/17 05:30   110 H   16  141/74  94 L


 


 05/02/17 05:00   116 H   17  141/70  91 L


 


 05/02/17 04:30   112 H   15  118/77  91 L


 


 05/02/17 04:00   112 H  113 H  14  135/72  92 L


 


 05/02/17 03:30   114 H   15  119/77  91 L


 


 05/02/17 03:00   109 H   15  139/80  90 L


 


 05/02/17 02:30   115 H   25 H  141/87  92 L


 


 05/02/17 02:00   128 H   19  118/64  95


 


 05/02/17 01:30   117 H   14  125/64  93 L


 


 05/02/17 01:00   118 H   14  123/75  93 L


 


 05/02/17 00:30   115 H   13  138/65  94 L


 


 05/02/17 00:00   120 H  112 H  37 H  140/75  95


 


 05/01/17 23:30   113 H   15  131/63  93 L


 


 05/01/17 23:00   111 H   17  116/80  92 L


 


 05/01/17 22:30   112 H   19  133/76 


 


 05/01/17 22:00   114 H   18  133/76 


 


 05/01/17 21:30     24  133/76 


 


 05/01/17 21:12  98.2 F   112 H  21  


 


 05/01/17 21:01   76    


 


 05/01/17 20:45  98.5 F  126 H   20  107/74  92 L


 


 05/01/17 19:49   125 H   20  113/78  91 L


 


 05/01/17 19:22   93   20   89 L


 


 05/01/17 19:00   134 H   22   97


 


 05/01/17 18:30   135 H  135 H  20  116/63  83 L


 


 05/01/17 17:30  98.2 F  145 H  140 H  22  112/71  90 L


 


 05/01/17 16:30  98.8 F  133 H  130 H  18  119/58  91 L








 Intake and Output











 05/02/17 05/02/17 05/02/17





 06:59 14:59 22:59


 


Intake Total 624.828 660 40


 


Output Total 500 350 850


 


Balance 124.828 310 -810


 


Intake:   


 


   160 40


 


    0.9 160 160 40


 


  Intake, IV Titration 464.828 500 





  Amount   


 


    Heparin Sodium,Porcine/ 464.828 500 





    D5w Pmx 25,000 unit In   





    Dextrose/Water 1 500ml.   





    bag @ 18 UNITS/KG/HR 42.   





    45 mls/hr IV .[id number] On license of [facility]   





    Rx#:[id number]   


 


Output:   


 


  Urine 500 350 850


 


Other:   


 


  Voiding Method Bedpan Bedpan Bedpan


 


  # Voids 1  


 


  Weight 111 kg  














Physical Exam: Revealed a 56-year-old female in no distress


HEENT:[Neck is supple.] [No neck masses.] [No thyromegaly.] [No JVD.]


Chest: [Clear throughout, no crackles, no rhonchi, no wheezes.]


Cardiac Exam: [Normal S1 and S2, no S3 gallop, no murmur.]


Abdomen: [Soft, nontender,  no megaly, no rebound, no guarding, normal bowel 

sounds.]


Extremities: [No clubbing, no edema, no cyanosis.]


Neurological Exam: [No focal neurologic deficit.]





Results





- Laboratory Findings


CBC and BMP: 


 05/02/17 04:53





 05/02/17 04:53


PT/INR, D-dimer











PT  10.6 sec (9.0-12.0)   05/01/17  13:20    


 


INR  1.0  (<1.1)   05/01/17  13:20    








Abnormal lab findings: 


 Abnormal Labs











  05/01/17 05/01/17 05/01/17





  18:47 19:54 23:02


 


WBC   


 


RBC   


 


MCV   


 


RDW   


 


APTT   74.4 H 


 


Carbon Dioxide   


 


Glucose   


 


POC Glucose (mg/dL)  150 H   143 H


 


Hemoglobin A1c   


 


Urine Blood   


 


Urine Bacteria   


 


Urine Mucus   














  05/02/17 05/02/17 05/02/17





  04:53 04:53 04:53


 


WBC    17.2 H


 


RBC    3.77 L


 


MCV    101.4 H


 


RDW    16.1 H


 


APTT   65.5 H 


 


Carbon Dioxide   


 


Glucose   


 


POC Glucose (mg/dL)   


 


Hemoglobin A1c  7.8 H  


 


Urine Blood   


 


Urine Bacteria   


 


Urine Mucus   














  05/02/17 05/02/17 05/02/17





  04:53 06:00 07:28


 


WBC   


 


RBC   


 


MCV   


 


RDW   


 


APTT   


 


Carbon Dioxide  31 H  


 


Glucose  173 H  


 


POC Glucose (mg/dL)    157 H


 


Hemoglobin A1c   


 


Urine Blood   Trace H 


 


Urine Bacteria   Rare H 


 


Urine Mucus   Rare H 














  05/02/17 05/02/17 05/02/17





  08:22 11:48 13:05


 


WBC   


 


RBC   


 


MCV   


 


RDW   


 


APTT   


 


Carbon Dioxide   


 


Glucose   


 


POC Glucose (mg/dL)  176 H  145 H  119 H


 


Hemoglobin A1c   


 


Urine Blood   


 


Urine Bacteria   


 


Urine Mucus   














- Diagnostic Findings


CT scan - chest: image reviewed (Bilateral significant pulmonary embolism noted.

)





Assessment and Plan


Plan: 





Impression:





1 recurrent bilateral pulmonary embolism in a patient with history of 

hypercoagulable state with factor V Leyden deficiency and noncompliance with 

medication for anticoagulation.





2 history of multiple comorbidities including obesity, type 2 diabetes, 

hypertension, hypothyroidism, and history of recent axillary lymph node excision

, the nature of her lymphadenopathy is not clear to me at this point.  But she 

was told it was benign.





Recommendation: Continue heparin, patient could be restarted back on Coumadin 

since it may be less costly, the patient will likely be more compliant with it, 

or if Xarelto have approved by her medical insurance, that would even be a 

better choice.  Patient will be transferred out of the ICU, and we'll continue 

to follow.  Consider discharge planning in the next 2-3 days.


Time with Patient: Greater than 30

## 2017-05-02 NOTE — XR
EXAMINATION TYPE: XR chest 1V portable

 

DATE OF EXAM: 5/2/2017 6:32 AM

 

Comparison: 5/1/2017

 

Clinical History: 56-year-old female, shortness of breath, bilateral PE's

 

Findings:

The heart remains borderline enlarged. Aorta within normal limits. Diffuse interstitial prominence is
 unchanged. Some strandy atelectasis at the left heart margin. No new area of consolidation or pleura
l effusion.

 

 

Impression:

Borderline cardiomegaly and chronic appearing changes. No acute process identified.

## 2017-05-03 LAB
ANION GAP SERPL CALC-SCNC: 8 MMOL/L
BASOPHILS # BLD AUTO: 0.1 K/UL (ref 0–0.2)
BASOPHILS NFR BLD AUTO: 1 %
BUN SERPL-SCNC: 11 MG/DL (ref 7–17)
CALCIUM SPEC-MCNC: 8.7 MG/DL (ref 8.4–10.2)
CH: 31.2
CHCM: 30.4
CHLORIDE SERPL-SCNC: 105 MMOL/L (ref 98–107)
CO2 SERPL-SCNC: 28 MMOL/L (ref 22–30)
EOSINOPHIL # BLD AUTO: 0.8 K/UL (ref 0–0.7)
EOSINOPHIL NFR BLD AUTO: 6 %
ERYTHROCYTE [DISTWIDTH] IN BLOOD BY AUTOMATED COUNT: 3.69 M/UL (ref 3.8–5.4)
ERYTHROCYTE [DISTWIDTH] IN BLOOD: 16.2 % (ref 11.5–15.5)
GLUCOSE BLD-MCNC: 107 MG/DL (ref 75–99)
GLUCOSE BLD-MCNC: 120 MG/DL (ref 75–99)
GLUCOSE BLD-MCNC: 136 MG/DL (ref 75–99)
GLUCOSE BLD-MCNC: 218 MG/DL (ref 75–99)
GLUCOSE SERPL-MCNC: 127 MG/DL (ref 74–99)
HCT VFR BLD AUTO: 38.1 % (ref 34–46)
HDW: 2.89
HGB BLD-MCNC: 11.4 GM/DL (ref 11.4–16)
LUC NFR BLD AUTO: 4 %
LYMPHOCYTES # SPEC AUTO: 4.5 K/UL (ref 1–4.8)
LYMPHOCYTES NFR SPEC AUTO: 31 %
MAGNESIUM SPEC-SCNC: 2 MG/DL (ref 1.6–2.3)
MCH RBC QN AUTO: 31 PG (ref 25–35)
MCHC RBC AUTO-ENTMCNC: 30 G/DL (ref 31–37)
MCV RBC AUTO: 103.2 FL (ref 80–100)
MONOCYTES # BLD AUTO: 1 K/UL (ref 0–1)
MONOCYTES NFR BLD AUTO: 7 %
NEUTROPHILS # BLD AUTO: 7.6 K/UL (ref 1.3–7.7)
NEUTROPHILS NFR BLD AUTO: 52 %
NON-AFRICAN AMERICAN GFR(MDRD): >60
POTASSIUM SERPL-SCNC: 4.1 MMOL/L (ref 3.5–5.1)
SODIUM SERPL-SCNC: 141 MMOL/L (ref 137–145)
WBC # BLD AUTO: 0.59 10*3/UL
WBC # BLD AUTO: 14.6 K/UL (ref 3.8–10.6)
WBC (PEROX): 14.65

## 2017-05-03 RX ADMIN — HYDROMORPHONE HYDROCHLORIDE PRN MG: 1 INJECTION, SOLUTION INTRAMUSCULAR; INTRAVENOUS; SUBCUTANEOUS at 06:13

## 2017-05-03 RX ADMIN — LEVOTHYROXINE SODIUM SCH MCG: 100 TABLET ORAL at 06:13

## 2017-05-03 RX ADMIN — POTASSIUM CHLORIDE SCH MEQ: 750 TABLET, EXTENDED RELEASE ORAL at 08:53

## 2017-05-03 RX ADMIN — INSULIN DETEMIR SCH UNIT: 100 INJECTION, SOLUTION SUBCUTANEOUS at 09:00

## 2017-05-03 RX ADMIN — FUROSEMIDE SCH MG: 40 TABLET ORAL at 08:52

## 2017-05-03 RX ADMIN — INSULIN LISPRO SCH UNIT: 100 INJECTION, SOLUTION INTRAVENOUS; SUBCUTANEOUS at 08:51

## 2017-05-03 RX ADMIN — RIVAROXABAN SCH MG: 15 TABLET, FILM COATED ORAL at 06:58

## 2017-05-03 RX ADMIN — ATENOLOL SCH MG: 25 TABLET ORAL at 20:36

## 2017-05-03 RX ADMIN — INSULIN LISPRO SCH: 100 INJECTION, SOLUTION INTRAVENOUS; SUBCUTANEOUS at 18:43

## 2017-05-03 RX ADMIN — ESCITALOPRAM SCH MG: 5 TABLET, FILM COATED ORAL at 20:36

## 2017-05-03 RX ADMIN — INSULIN LISPRO SCH UNIT: 100 INJECTION, SOLUTION INTRAVENOUS; SUBCUTANEOUS at 20:37

## 2017-05-03 RX ADMIN — LEVOTHYROXINE SODIUM SCH MCG: 75 TABLET ORAL at 06:13

## 2017-05-03 RX ADMIN — RIVAROXABAN SCH MG: 15 TABLET, FILM COATED ORAL at 18:51

## 2017-05-03 RX ADMIN — PANTOPRAZOLE SODIUM SCH MG: 40 TABLET, DELAYED RELEASE ORAL at 06:58

## 2017-05-03 RX ADMIN — INSULIN LISPRO SCH: 100 INJECTION, SOLUTION INTRAVENOUS; SUBCUTANEOUS at 14:10

## 2017-05-04 LAB
GLUCOSE BLD-MCNC: 123 MG/DL (ref 75–99)
GLUCOSE BLD-MCNC: 125 MG/DL (ref 75–99)
GLUCOSE BLD-MCNC: 170 MG/DL (ref 75–99)
GLUCOSE BLD-MCNC: 78 MG/DL (ref 75–99)

## 2017-05-04 RX ADMIN — INSULIN LISPRO SCH: 100 INJECTION, SOLUTION INTRAVENOUS; SUBCUTANEOUS at 08:01

## 2017-05-04 RX ADMIN — PANTOPRAZOLE SODIUM SCH MG: 40 TABLET, DELAYED RELEASE ORAL at 08:01

## 2017-05-04 RX ADMIN — ESCITALOPRAM SCH MG: 5 TABLET, FILM COATED ORAL at 21:55

## 2017-05-04 RX ADMIN — INSULIN LISPRO SCH: 100 INJECTION, SOLUTION INTRAVENOUS; SUBCUTANEOUS at 12:25

## 2017-05-04 RX ADMIN — INSULIN DETEMIR SCH UNIT: 100 INJECTION, SOLUTION SUBCUTANEOUS at 08:07

## 2017-05-04 RX ADMIN — POTASSIUM CHLORIDE SCH MEQ: 750 TABLET, EXTENDED RELEASE ORAL at 08:02

## 2017-05-04 RX ADMIN — RIVAROXABAN SCH MG: 15 TABLET, FILM COATED ORAL at 08:01

## 2017-05-04 RX ADMIN — LEVOTHYROXINE SODIUM SCH MCG: 75 TABLET ORAL at 06:44

## 2017-05-04 RX ADMIN — FUROSEMIDE SCH MG: 40 TABLET ORAL at 08:01

## 2017-05-04 RX ADMIN — ACETAMINOPHEN PRN MG: 325 TABLET, FILM COATED ORAL at 23:17

## 2017-05-04 RX ADMIN — RIVAROXABAN SCH MG: 15 TABLET, FILM COATED ORAL at 17:36

## 2017-05-04 RX ADMIN — INSULIN LISPRO SCH UNIT: 100 INJECTION, SOLUTION INTRAVENOUS; SUBCUTANEOUS at 21:56

## 2017-05-04 RX ADMIN — INSULIN LISPRO SCH: 100 INJECTION, SOLUTION INTRAVENOUS; SUBCUTANEOUS at 17:33

## 2017-05-04 RX ADMIN — LEVOTHYROXINE SODIUM SCH MCG: 100 TABLET ORAL at 06:44

## 2017-05-04 RX ADMIN — ATENOLOL SCH MG: 25 TABLET ORAL at 21:56

## 2017-05-04 NOTE — CDI
In responding to this query, please exercise your independent professional 
judgment. The [facility] Coding Staff and Clinical Documentation Specialists 
appreciate your assistance in clarifying documentation, maintaining compliance 
with coding guidelines, accurately documenting patients condition and 
capturing severity of illness. The fact that a question is asked does not imply 
that any particular answer is desired or expected. Communication forms are a 
method of clarifying documentation and are not made part of the Legal Health 
Record. Thank you in advance for your clarification.

 Last Revision, March 2016



Michael Wilcox

[address]

[place] [address]



Documentation Clarification Form



Date: 5/4/2017 3:50:00 PM

From: Luanne Briones

Phone: [phone]

MRN: [id number]

Admit Date: 5/1/2017 2:28:00 PM

Patient Name: Aga Holbrook

Visit Number: [id number]

Discharge Date:





Dr. Gustavo Noriega



The patient presented with progressive worsening difficulty of breathing. 



Hypoxic respiratory failure is in your H&P and progress notes.



History/Risk Factors: Hypothyrodism, Hypertension, Diabetes mellitus type 2, 
Pulmonary Embolism, Asthma

Clinical Indicators: Difficulty breathing and some chest pressure. History of 
multiple blood clots not taking her Xarelto. 

Vital signs/Pulse oximetry: 159/103 142 26  87 % RA 90 % 3/L NC 

Lung/Breathing assessment: Good air entry, clear to auscultation.



Treatment: 

Ventolin Nebulized per orders

Monitor O2 Sat's (titrate)



In your professional opinion, can you please further clarify the hypoxic 
respiratory failure?  



    Acuity:



        Acute

        Chronic

        Acute on Chronic



Please document in your progress notes and discharge summary in order to 
capture severity of illness and risk of mortality. Include clinical findings 
that support your diagnosis.



FYI: Press F11 to launch patient chart.



_____ Place X here if this finding has no clinical significance, is not 
applicable or if you are not able to provide any additional documentation.

KAILYN

## 2017-05-05 VITALS
DIASTOLIC BLOOD PRESSURE: 66 MMHG | HEART RATE: 93 BPM | SYSTOLIC BLOOD PRESSURE: 150 MMHG | TEMPERATURE: 96.9 F | RESPIRATION RATE: 20 BRPM

## 2017-05-05 LAB
GLUCOSE BLD-MCNC: 106 MG/DL (ref 75–99)
GLUCOSE BLD-MCNC: 130 MG/DL (ref 75–99)

## 2017-05-05 RX ADMIN — INSULIN LISPRO SCH: 100 INJECTION, SOLUTION INTRAVENOUS; SUBCUTANEOUS at 07:37

## 2017-05-05 RX ADMIN — LEVOTHYROXINE SODIUM SCH MCG: 75 TABLET ORAL at 06:42

## 2017-05-05 RX ADMIN — RIVAROXABAN SCH MG: 15 TABLET, FILM COATED ORAL at 09:03

## 2017-05-05 RX ADMIN — PANTOPRAZOLE SODIUM SCH MG: 40 TABLET, DELAYED RELEASE ORAL at 09:04

## 2017-05-05 RX ADMIN — INSULIN DETEMIR SCH UNIT: 100 INJECTION, SOLUTION SUBCUTANEOUS at 09:02

## 2017-05-05 RX ADMIN — LEVOTHYROXINE SODIUM SCH MCG: 100 TABLET ORAL at 06:42

## 2017-05-05 RX ADMIN — FUROSEMIDE SCH MG: 40 TABLET ORAL at 09:04

## 2017-05-05 RX ADMIN — POTASSIUM CHLORIDE SCH MEQ: 750 TABLET, EXTENDED RELEASE ORAL at 09:04

## 2017-05-05 RX ADMIN — INSULIN LISPRO SCH: 100 INJECTION, SOLUTION INTRAVENOUS; SUBCUTANEOUS at 12:22

## 2017-05-05 RX ADMIN — RIVAROXABAN SCH MG: 15 TABLET, FILM COATED ORAL at 16:36

## 2017-09-07 ENCOUNTER — HOSPITAL ENCOUNTER (OUTPATIENT)
Dept: HOSPITAL 47 - RADCTMAIN | Age: 57
Discharge: HOME | End: 2017-09-07
Payer: COMMERCIAL

## 2017-09-07 DIAGNOSIS — R05: ICD-10-CM

## 2017-09-07 DIAGNOSIS — R06.02: ICD-10-CM

## 2017-09-07 DIAGNOSIS — I26.99: Primary | ICD-10-CM

## 2017-09-07 DIAGNOSIS — R07.9: ICD-10-CM

## 2017-09-07 LAB
ALP SERPL-CCNC: 129 U/L (ref 38–126)
ALT SERPL-CCNC: 40 U/L (ref 9–52)
ANION GAP SERPL CALC-SCNC: 8 MMOL/L
AST SERPL-CCNC: 50 U/L (ref 14–36)
BASOPHILS # BLD AUTO: 0.1 K/UL (ref 0–0.2)
BASOPHILS NFR BLD AUTO: 1 %
BUN SERPL-SCNC: 12 MG/DL (ref 7–17)
CALCIUM SPEC-MCNC: 9.4 MG/DL (ref 8.4–10.2)
CH: 30
CHCM: 31.4
CHLORIDE SERPL-SCNC: 103 MMOL/L (ref 98–107)
CO2 SERPL-SCNC: 30 MMOL/L (ref 22–30)
EOSINOPHIL # BLD AUTO: 0.6 K/UL (ref 0–0.7)
EOSINOPHIL NFR BLD AUTO: 5 %
ERYTHROCYTE [DISTWIDTH] IN BLOOD BY AUTOMATED COUNT: 3.79 M/UL (ref 3.8–5.4)
ERYTHROCYTE [DISTWIDTH] IN BLOOD: 17.9 % (ref 11.5–15.5)
GLUCOSE SERPL-MCNC: 120 MG/DL (ref 74–99)
HCT VFR BLD AUTO: 36.6 % (ref 34–46)
HDW: 3.31
HGB BLD-MCNC: 11.7 GM/DL (ref 11.4–16)
LUC NFR BLD AUTO: 4 %
LYMPHOCYTES # SPEC AUTO: 4.6 K/UL (ref 1–4.8)
LYMPHOCYTES NFR SPEC AUTO: 36 %
MCH RBC QN AUTO: 30.7 PG (ref 25–35)
MCHC RBC AUTO-ENTMCNC: 31.9 G/DL (ref 31–37)
MCV RBC AUTO: 96.4 FL (ref 80–100)
MONOCYTES # BLD AUTO: 0.7 K/UL (ref 0–1)
MONOCYTES NFR BLD AUTO: 6 %
NEUTROPHILS # BLD AUTO: 6.4 K/UL (ref 1.3–7.7)
NEUTROPHILS NFR BLD AUTO: 50 %
NON-AFRICAN AMERICAN GFR(MDRD): >60
POTASSIUM SERPL-SCNC: 4.7 MMOL/L (ref 3.5–5.1)
PROT SERPL-MCNC: 7.8 G/DL (ref 6.3–8.2)
SODIUM SERPL-SCNC: 141 MMOL/L (ref 137–145)
WBC # BLD AUTO: 0.46 10*3/UL
WBC # BLD AUTO: 12.8 K/UL (ref 3.8–10.6)
WBC (PEROX): 13.44

## 2017-09-07 PROCEDURE — 71275 CT ANGIOGRAPHY CHEST: CPT

## 2017-09-07 PROCEDURE — 85025 COMPLETE CBC W/AUTO DIFF WBC: CPT

## 2017-09-07 PROCEDURE — 36415 COLL VENOUS BLD VENIPUNCTURE: CPT

## 2017-09-07 PROCEDURE — 80053 COMPREHEN METABOLIC PANEL: CPT

## 2017-09-07 NOTE — CT
EXAMINATION TYPE: CT angio chest

 

DATE OF EXAM: 9/7/2017 7:58 PM

 

COMPARISON: 2/10/2017

 

HISTORY: Shortness of breath, cough and chest pain x 2 weeks.

 

CT DLP: 576.40 mGycm

Automated exposure control for dose reduction was used.

 

CONTRAST: 

CTA scan of the thorax is performed with IV Contrast, patient injected with 87 mL of Omnipaque 350, p
ulmonary embolism protocol. .  

 

FINDINGS:

LUNGS: The lungs are grossly clear, there is no concerning parenchymal mass or nodule identified.   T
here is no pleural effusion or pneumothorax seen.  The tracheobronchial tree is patent.

 

MEDIASTINUM: There is moderate enhancement of the pulmonary artery and its branches, there is no CT e
vidence for pulmonary embolism. The aorta is unremarkable. The heart and pericardial spaces unremarka
ble. There are no greater than 1 cm hilar or mediastinal lymph nodes. 

 

OTHER:  No additional significant abnormality is seen.

 

IMPRESSION: 

NO ACUTE PROCESS.

## 2018-01-15 ENCOUNTER — HOSPITAL ENCOUNTER (OUTPATIENT)
Dept: HOSPITAL 47 - RADMAMWWP | Age: 58
Discharge: HOME | End: 2018-01-15
Payer: COMMERCIAL

## 2018-01-15 DIAGNOSIS — R59.0: ICD-10-CM

## 2018-01-15 DIAGNOSIS — N64.4: Primary | ICD-10-CM

## 2018-01-15 PROCEDURE — 77066 DX MAMMO INCL CAD BI: CPT

## 2018-01-15 NOTE — MM
Reason for exam: additional evaluation requested from prior study.

Last mammogram was performed 1 year and 4 months ago.



History:

Patient is postmenopausal and has history of other cancer at age 51.

Family history of breast cancer in sister at age 66.

Benign excisional biopsy, October 7, 1997.

Taking other hormone.



Physical Findings:

Nurse did not find any significant physical abnormalities on exam.



MG Diagnostic Mammo w CAD ELENA

Bilateral CC and MLO view(s) were taken.

Prior study comparison: September 2, 2016, bilateral MG diagnostic mammo w CAD 

ELENA.  January 16, 2015, bilateral MG screening mammo w CAD.

There are scattered fibroglandular densities.  There is chronic nodularity 

bilaterally.

No significant new findings when compared with previous films.



These results were verbally communicated with the patient and result sheet given 

to the patient on 1/15/18.





ASSESSMENT: Incomplete: need additional imaging evaluation, BI-RAD 0



RECOMMENDATION:

Ultrasound of the left breast.

(for the palpable areas in the axilla)

## 2018-01-15 NOTE — USB
Reason for exam: additional evaluation requested from abnormal screening.



History:

Patient is postmenopausal and has history of other cancer at age 51.

Family history of breast cancer in sister at age 66.

Benign excisional biopsy, October 7, 1997.

Taking other hormone.



US Breast LT

Left breast ultrasound includes all four quadrants, the retroareolar region and 

axilla. Finding demonstrates a 2.5 x 1.9 x 1.3cm oval node at 2 o'clock axilla, 

round, hypoechoic, mildly enlarged, a 1.3 x 1.4 x 0.9cm oval node at 3 o'clock 

axilla, borderline enlarged and a 1.5 x 1.6 x 0.9cm oval node at the axilla, 

borderline enlarged. (The previous 3.8cm x 1.7cm axillary node seen previously 
is 

reported to have been excised with benign results). The breast shows no solid 
or 

cystic lesion.



These results were verbally communicated with the patient and result sheet 
given 

to the patient on 1/15/18.





ASSESSMENT: Probably benign, BI-RAD 3



RECOMMENDATION:

Ultrasound of the left breast in 3 months.

Manage patient on a clinical basis. Possible benign etiology to the borderline 
and

mild left axillary lymphadenopathy given previous benign excision. The nodes 
should be monitored clinically. 

They can be re-imaged in 3 months to assess for resolution.

[facility]

## 2018-04-13 ENCOUNTER — HOSPITAL ENCOUNTER (OUTPATIENT)
Dept: HOSPITAL 47 - EC | Age: 58
Setting detail: OBSERVATION
LOS: 3 days | Discharge: HOME | End: 2018-04-16
Attending: INTERNAL MEDICINE | Admitting: INTERNAL MEDICINE
Payer: COMMERCIAL

## 2018-04-13 VITALS — BODY MASS INDEX: 45.3 KG/M2

## 2018-04-13 DIAGNOSIS — Z79.890: ICD-10-CM

## 2018-04-13 DIAGNOSIS — Z91.040: ICD-10-CM

## 2018-04-13 DIAGNOSIS — Z88.8: ICD-10-CM

## 2018-04-13 DIAGNOSIS — E86.0: ICD-10-CM

## 2018-04-13 DIAGNOSIS — I10: ICD-10-CM

## 2018-04-13 DIAGNOSIS — J45.909: ICD-10-CM

## 2018-04-13 DIAGNOSIS — Z79.01: ICD-10-CM

## 2018-04-13 DIAGNOSIS — R00.2: ICD-10-CM

## 2018-04-13 DIAGNOSIS — K21.9: ICD-10-CM

## 2018-04-13 DIAGNOSIS — D72.829: ICD-10-CM

## 2018-04-13 DIAGNOSIS — Z80.8: ICD-10-CM

## 2018-04-13 DIAGNOSIS — Z91.81: ICD-10-CM

## 2018-04-13 DIAGNOSIS — Z86.718: ICD-10-CM

## 2018-04-13 DIAGNOSIS — F41.9: ICD-10-CM

## 2018-04-13 DIAGNOSIS — R06.00: Primary | ICD-10-CM

## 2018-04-13 DIAGNOSIS — E11.9: ICD-10-CM

## 2018-04-13 DIAGNOSIS — Z86.711: ICD-10-CM

## 2018-04-13 DIAGNOSIS — R07.89: ICD-10-CM

## 2018-04-13 DIAGNOSIS — Z79.4: ICD-10-CM

## 2018-04-13 DIAGNOSIS — Z79.1: ICD-10-CM

## 2018-04-13 DIAGNOSIS — R05: ICD-10-CM

## 2018-04-13 DIAGNOSIS — R42: ICD-10-CM

## 2018-04-13 DIAGNOSIS — R60.0: ICD-10-CM

## 2018-04-13 DIAGNOSIS — Z85.850: ICD-10-CM

## 2018-04-13 DIAGNOSIS — Z80.3: ICD-10-CM

## 2018-04-13 DIAGNOSIS — Z79.84: ICD-10-CM

## 2018-04-13 DIAGNOSIS — Z79.899: ICD-10-CM

## 2018-04-13 DIAGNOSIS — Z87.01: ICD-10-CM

## 2018-04-13 DIAGNOSIS — Z80.0: ICD-10-CM

## 2018-04-13 DIAGNOSIS — R53.83: ICD-10-CM

## 2018-04-13 DIAGNOSIS — F32.9: ICD-10-CM

## 2018-04-13 DIAGNOSIS — E66.9: ICD-10-CM

## 2018-04-13 DIAGNOSIS — Z80.51: ICD-10-CM

## 2018-04-13 LAB
ALBUMIN SERPL-MCNC: 3.5 G/DL (ref 3.5–5)
ALP SERPL-CCNC: 154 U/L (ref 38–126)
ALT SERPL-CCNC: 33 U/L (ref 9–52)
ANION GAP SERPL CALC-SCNC: 12 MMOL/L
APTT BLD: 20.9 SEC (ref 22–30)
AST SERPL-CCNC: 33 U/L (ref 14–36)
BASOPHILS # BLD AUTO: 0.1 K/UL (ref 0–0.2)
BASOPHILS NFR BLD AUTO: 1 %
BUN SERPL-SCNC: 11 MG/DL (ref 7–17)
CALCIUM SPEC-MCNC: 9 MG/DL (ref 8.4–10.2)
CHLORIDE SERPL-SCNC: 105 MMOL/L (ref 98–107)
CK SERPL-CCNC: 142 U/L (ref 30–135)
CO2 SERPL-SCNC: 25 MMOL/L (ref 22–30)
EOSINOPHIL # BLD AUTO: 0.5 K/UL (ref 0–0.7)
EOSINOPHIL NFR BLD AUTO: 4 %
ERYTHROCYTE [DISTWIDTH] IN BLOOD BY AUTOMATED COUNT: 3.69 M/UL (ref 3.8–5.4)
ERYTHROCYTE [DISTWIDTH] IN BLOOD: 17.1 % (ref 11.5–15.5)
GLUCOSE BLD-MCNC: 128 MG/DL (ref 75–99)
GLUCOSE SERPL-MCNC: 101 MG/DL (ref 74–99)
HCT VFR BLD AUTO: 34.4 % (ref 34–46)
HGB BLD-MCNC: 10.7 GM/DL (ref 11.4–16)
INR PPP: 1 (ref ?–1.2)
LYMPHOCYTES # SPEC AUTO: 4.1 K/UL (ref 1–4.8)
LYMPHOCYTES NFR SPEC AUTO: 33 %
MAGNESIUM SPEC-SCNC: 1.9 MG/DL (ref 1.6–2.3)
MCH RBC QN AUTO: 29 PG (ref 25–35)
MCHC RBC AUTO-ENTMCNC: 31.1 G/DL (ref 31–37)
MCV RBC AUTO: 93.4 FL (ref 80–100)
MONOCYTES # BLD AUTO: 0.7 K/UL (ref 0–1)
MONOCYTES NFR BLD AUTO: 6 %
NEUTROPHILS # BLD AUTO: 6.6 K/UL (ref 1.3–7.7)
NEUTROPHILS NFR BLD AUTO: 53 %
PLATELET # BLD AUTO: 398 K/UL (ref 150–450)
POTASSIUM SERPL-SCNC: 4 MMOL/L (ref 3.5–5.1)
PROT SERPL-MCNC: 6.9 G/DL (ref 6.3–8.2)
PT BLD: 9.5 SEC (ref 9–12)
SODIUM SERPL-SCNC: 142 MMOL/L (ref 137–145)
TROPONIN I SERPL-MCNC: <0.012 NG/ML (ref 0–0.03)
WBC # BLD AUTO: 12.3 K/UL (ref 3.8–10.6)

## 2018-04-13 PROCEDURE — 83036 HEMOGLOBIN GLYCOSYLATED A1C: CPT

## 2018-04-13 PROCEDURE — 80053 COMPREHEN METABOLIC PANEL: CPT

## 2018-04-13 PROCEDURE — 84484 ASSAY OF TROPONIN QUANT: CPT

## 2018-04-13 PROCEDURE — 99285 EMERGENCY DEPT VISIT HI MDM: CPT

## 2018-04-13 PROCEDURE — 94760 N-INVAS EAR/PLS OXIMETRY 1: CPT

## 2018-04-13 PROCEDURE — 83735 ASSAY OF MAGNESIUM: CPT

## 2018-04-13 PROCEDURE — 96374 THER/PROPH/DIAG INJ IV PUSH: CPT

## 2018-04-13 PROCEDURE — 71275 CT ANGIOGRAPHY CHEST: CPT

## 2018-04-13 PROCEDURE — 82553 CREATINE MB FRACTION: CPT

## 2018-04-13 PROCEDURE — 96376 TX/PRO/DX INJ SAME DRUG ADON: CPT

## 2018-04-13 PROCEDURE — 85730 THROMBOPLASTIN TIME PARTIAL: CPT

## 2018-04-13 PROCEDURE — 82550 ASSAY OF CK (CPK): CPT

## 2018-04-13 PROCEDURE — 80061 LIPID PANEL: CPT

## 2018-04-13 PROCEDURE — 93306 TTE W/DOPPLER COMPLETE: CPT

## 2018-04-13 PROCEDURE — 80048 BASIC METABOLIC PNL TOTAL CA: CPT

## 2018-04-13 PROCEDURE — 97161 PT EVAL LOW COMPLEX 20 MIN: CPT

## 2018-04-13 PROCEDURE — 36415 COLL VENOUS BLD VENIPUNCTURE: CPT

## 2018-04-13 PROCEDURE — 71046 X-RAY EXAM CHEST 2 VIEWS: CPT

## 2018-04-13 PROCEDURE — 85610 PROTHROMBIN TIME: CPT

## 2018-04-13 PROCEDURE — 73590 X-RAY EXAM OF LOWER LEG: CPT

## 2018-04-13 PROCEDURE — 94640 AIRWAY INHALATION TREATMENT: CPT

## 2018-04-13 PROCEDURE — 85025 COMPLETE CBC W/AUTO DIFF WBC: CPT

## 2018-04-13 PROCEDURE — 93971 EXTREMITY STUDY: CPT

## 2018-04-13 PROCEDURE — 93005 ELECTROCARDIOGRAM TRACING: CPT

## 2018-04-13 PROCEDURE — 83880 ASSAY OF NATRIURETIC PEPTIDE: CPT

## 2018-04-13 NOTE — CT
EXAMINATION TYPE: CT chest angio for PE

 

DATE OF EXAM: 4/13/2018

 

COMPARISON: NONE

 

HISTORY: Right lower leg redness and pain. SOB.

 

CT DLP: 569.8 mGycm

Automated exposure control for dose reduction was used.

 

CONTRAST: 

CT Chest for pulmonary embolism performed with with IV Contrast, patient injected with 65ml mL of Iso
randa 370.

 

FINDINGS:

The lungs are clear of consolidation. There is no evidence of a pulmonary mass. There is mild coarsen
ing of pulmonary interstitial markings. There is no mediastinal adenopathy. There are no hilar masses
. Heart appears enlarged.

 

I see no filling defects in the pulmonary arteries. There is no pleural effusion or pericardial effus
ion. There is spurring in the thoracic spine.

 

IMPRESSION:

No evidence of pulmonary embolism. Cardiomegaly. Pulmonary fibrotic changes. There is improved aerati
on of the lungs compared to last exam.

## 2018-04-13 NOTE — XR
EXAMINATION TYPE: XR chest 2V

 

DATE OF EXAM: 4/13/2018

 

COMPARISON: 5/2/2017

 

HISTORY: Difficulty breathing

 

TECHNIQUE:  Frontal and lateral views of the chest are obtained.

 

FINDINGS:  There is coarsening of pulmonary interstitial markings. There is no pleural effusion. Ther
e are no hilar masses. There are chest leads. Heart size is normal.

 

IMPRESSION:  Coarse lung markings are increased compared to last exam and could relate to some pulmon
miranda fibrosis. Mild heart failure is possible..

## 2018-04-13 NOTE — ED
General Adult HPI





- General


Source: patient, RN notes reviewed, old records reviewed


Mode of arrival: wheelchair


Limitations: no limitations





<Joseph Dubon - Last Filed: 04/13/18 20:58>





<Lester Raymundo - Last Filed: 04/13/18 23:12>





- General


Chief complaint: Shortness of Breath


Stated complaint: Leg pain


Time Seen by Provider: 04/13/18 19:12





- History of Present Illness


Initial comments: 





58 yo female presenting with chief complaint of right lower extremity pain and 

swelling and dyspnea.  Patient has history of DVT and PE.  She is currently on 

Xarelto, she has not missed any doses.  Most recently patient had a pulmonary 

embolism approximately 2 years ago.  She also complains of mild cough over the 

past week which is been worse with lying flat.  She did see her primary care 

physician for the pain in her right lower extremity reveals a day.  X-rays were 

obtained and she states there was a small fracture, this was secondary to a 

fall approximately 2 weeks ago.  (Joseph Dubon)





- Related Data


 Home Medications











 Medication  Instructions  Recorded  Confirmed


 


Atenolol [Tenormin] 25 mg PO HS 02/05/15 04/13/18


 


Furosemide [Lasix] 40 mg PO QAM 02/05/15 04/13/18


 


Potassium Chloride [Klor-Con 10] 10 meq PO HS 02/05/15 04/13/18


 


Albuterol Inhaler [Ventolin Hfa 2 puff INHALATION RT-Q4H PRN 05/01/17 04/13/18





Inhaler]   


 


Naproxen Sodium [Naproxen Sodium 550 mg PO BID 05/01/17 04/13/18





DS]   


 


Omeprazole Magnesium 20.6mg 1 cap PO DAILY 05/01/17 04/13/18


 


metFORMIN HCL 1,000 mg PO AC-BID 05/01/17 04/13/18


 


rOPINIRole HCL [Requip] 4 mg PO HS 05/03/17 04/13/18


 


Albiglutide [Tanzeum] 30 mg SQ Q7D 04/13/18 04/13/18


 


Empagliflozin [Jardiance] 10 mg PO DAILY 04/13/18 04/13/18


 


Escitalopram [Lexapro] 20 mg PO HS 04/13/18 04/13/18


 


Insulin Glargine,Hum.rec.anlog 20 unit SQ DAILY 04/13/18 04/13/18





[Lantus Solostar]   


 


Levothyroxine Sodium [Synthroid] 150 mcg PO MOTUWETHFRSA 04/13/18 04/13/18


 


Levothyroxine Sodium [Synthroid] 225 mcg PO HAAS 04/13/18 04/13/18


 


Methocarbamol [Robaxin] 750 mg PO Q6H PRN 04/13/18 04/13/18


 


Mometasone/Formoterol [Dulera 100 2 puff INHALATION RT-BID 04/13/18 04/13/18





Mcg/5 Mcg Inhaler]   


 


Rivaroxaban [Xarelto] 20 mg PO HS 04/13/18 04/13/18


 


Triamcinolone 0.5% Cream [Kenalog 1 applic TOPICAL BID 04/13/18 04/13/18





0.5% Cream]   


 


traZODone HCL [Desyrel] 100 mg PO HS PRN 04/13/18 04/13/18











 Allergies











Allergy/AdvReac Type Severity Reaction Status Date / Time


 


latex Allergy  Rash/Hives Verified 04/13/18 20:00


 


sitagliptin [From Januvia] Allergy  Rash/Hives Verified 04/13/18 20:00














Review of Systems


ROS Other: All systems not noted in ROS Statement are negative.





<Jsoeph Dubon - Last Filed: 04/13/18 20:58>


ROS Other: All systems not noted in ROS Statement are negative.





<Lester Raymundo - Last Filed: 04/13/18 23:12>


ROS Statement: 


Those systems with pertinent positive or pertinent negative responses have been 

documented in the HPI.








Past Medical History


Past Medical History: Asthma, Cancer, Chest Pain / Angina, Deep Vein Thrombosis 

(DVT), GERD/Reflux, Hypertension, Pneumonia, Pulmonary Embolus (PE), Skin 

Disorder


Additional Past Medical History / Comment(s): blood in stool, ca-thyroid, pe,pna


History of Any Multi-Drug Resistant Organisms: None Reported


Past Surgical History: Breast Surgery, Hysterectomy


Additional Past Surgical History / Comment(s): thyroid removed, spleenectomy, 

lumps removed in breast


Past Anesthesia/Blood Transfusion Reactions: No Reported Reaction


Past Psychological History: Anxiety


Smoking Status: Never smoker


Past Alcohol Use History: None Reported


Past Drug Use History: None Reported





- Past Family History


  ** mother


Family Medical History: Cancer


Additional Family Medical History / Comment(s): colon





  ** brother


Family Medical History: Cancer


Additional Family Medical History / Comment(s): liver, kidneys, pancreas, clots





  ** sister


Family Medical History: Cancer


Additional Family Medical History / Comment(s): breast ca, clots





<Joseph Dubon - Last Filed: 04/13/18 20:58>





General Exam


Limitations: no limitations


General appearance: alert, in no apparent distress


Head exam: Present: atraumatic, normocephalic


Eye exam: Present: normal appearance, PERRL, EOMI


ENT exam: Present: normal exam


Neck exam: Present: normal inspection.  Absent: tenderness, meningismus


Respiratory exam: Present: normal lung sounds bilaterally.  Absent: respiratory 

distress, wheezes


Cardiovascular Exam: Present: regular rate, normal rhythm


GI/Abdominal exam: Present: soft.  Absent: distended, tenderness


Extremities exam: Present: pedal edema, calf tenderness, other (Swelling and 

tenderness in the right calf, there is ecchymosis)


Neurological exam: Present: alert, oriented X3, CN II-XII intact.  Absent: 

motor sensory deficit


Psychiatric exam: Present: normal affect, normal mood


Skin exam: Present: warm, dry, intact





<Joseph Dubon - Last Filed: 04/13/18 20:58>





Course





<Joseph Dubon - Last Filed: 04/13/18 20:58>





<Lester Raymundo - Last Filed: 04/13/18 23:12>





 Vital Signs











  04/13/18 04/13/18 04/13/18





  18:58 21:40 22:00


 


Temperature 98.9 F 99.0 F 


 


Pulse Rate 105 H 98 83


 


Respiratory 20 18 16





Rate   


 


Blood Pressure 132/65 134/59 126/69


 


O2 Sat by Pulse 95 96 95





Oximetry   














- Reevaluation(s)


Reevaluation #1: 





04/13/18 20:59


Patient's care is signed out to Dr. Raymundo at shift change awaiting ultrasound 

results.    (Joseph Dubon)





EKG Findings





- EKG Comments:


EKG Findings:: EKG, normal sinus rhythm, low voltage QRS, ventricular rate of 88

, AR interval 166, QRS duration 84, , no definitive signs of ischemia.





<Joseph Dubon - Last Filed: 04/13/18 20:58>





Medical Decision Making





- Lab Data


Result diagrams: 


 04/13/18 19:40





 04/13/18 19:40





<JagdishJoseph MORENO - Last Filed: 04/13/18 20:58>





- Lab Data


Result diagrams: 


 04/13/18 19:40





 04/13/18 19:40





<Lester Raymundo - Last Filed: 04/13/18 23:12>





- Medical Decision Making





I went back in and really evaluated the patient she was stating she was having 

shortness of breath but she also mentioned earlier she had a near syncopal 

episode and was having chest heaviness.  I got the patient up and ambulated her 

around the emergency department and she continued to be short of breath and 

feel lightheaded.  At this point in time she did not feel comfortable going 

home so I admitted the patient I repeated her troponins I consult cardiology 

and admitted the patient to Dr. Inman (Lester Raymundo)





- Lab Data





 Lab Results











  04/13/18 04/13/18 04/13/18 Range/Units





  19:40 19:40 19:40 


 


WBC   12.3 H   (3.8-10.6)  k/uL


 


RBC   3.69 L   (3.80-5.40)  m/uL


 


Hgb   10.7 L   (11.4-16.0)  gm/dL


 


Hct   34.4   (34.0-46.0)  %


 


MCV   93.4   (80.0-100.0)  fL


 


MCH   29.0   (25.0-35.0)  pg


 


MCHC   31.1   (31.0-37.0)  g/dL


 


RDW   17.1 H   (11.5-15.5)  %


 


Plt Count   398   (150-450)  k/uL


 


Neutrophils %   53   %


 


Lymphocytes %   33   %


 


Monocytes %   6   %


 


Eosinophils %   4   %


 


Basophils %   1   %


 


Neutrophils #   6.6   (1.3-7.7)  k/uL


 


Lymphocytes #   4.1   (1.0-4.8)  k/uL


 


Monocytes #   0.7   (0-1.0)  k/uL


 


Eosinophils #   0.5   (0-0.7)  k/uL


 


Basophils #   0.1   (0-0.2)  k/uL


 


Hypochromasia   Moderate   


 


Poikilocytosis   Slight   


 


Anisocytosis   Slight   


 


PT     (9.0-12.0)  sec


 


INR     (<1.2)  


 


APTT     (22.0-30.0)  sec


 


Sodium    142  (137-145)  mmol/L


 


Potassium    4.0  (3.5-5.1)  mmol/L


 


Chloride    105  ()  mmol/L


 


Carbon Dioxide    25  (22-30)  mmol/L


 


Anion Gap    12  mmol/L


 


BUN    11  (7-17)  mg/dL


 


Creatinine    0.52  (0.52-1.04)  mg/dL


 


Est GFR (CKD-EPI)AfAm    >90  (>60 ml/min/1.73 sqM)  


 


Est GFR (CKD-EPI)NonAf    >90  (>60 ml/min/1.73 sqM)  


 


Glucose    101 H  (74-99)  mg/dL


 


Calcium    9.0  (8.4-10.2)  mg/dL


 


Magnesium    1.9  (1.6-2.3)  mg/dL


 


Total Bilirubin    0.2  (0.2-1.3)  mg/dL


 


AST    33  (14-36)  U/L


 


ALT    33  (9-52)  U/L


 


Alkaline Phosphatase    154 H  ()  U/L


 


Total Creatine Kinase  142 H    ()  U/L


 


CK-MB (CK-2)  1.6    (0.0-2.4)  ng/mL


 


CK-MB (CK-2) Rel Index  1.1    


 


Troponin I  <0.012    (0.000-0.034)  ng/mL


 


NT-Pro-B Natriuret Pep     pg/mL


 


Total Protein    6.9  (6.3-8.2)  g/dL


 


Albumin    3.5  (3.5-5.0)  g/dL














  04/13/18 04/13/18 Range/Units





  19:40 19:40 


 


WBC    (3.8-10.6)  k/uL


 


RBC    (3.80-5.40)  m/uL


 


Hgb    (11.4-16.0)  gm/dL


 


Hct    (34.0-46.0)  %


 


MCV    (80.0-100.0)  fL


 


MCH    (25.0-35.0)  pg


 


MCHC    (31.0-37.0)  g/dL


 


RDW    (11.5-15.5)  %


 


Plt Count    (150-450)  k/uL


 


Neutrophils %    %


 


Lymphocytes %    %


 


Monocytes %    %


 


Eosinophils %    %


 


Basophils %    %


 


Neutrophils #    (1.3-7.7)  k/uL


 


Lymphocytes #    (1.0-4.8)  k/uL


 


Monocytes #    (0-1.0)  k/uL


 


Eosinophils #    (0-0.7)  k/uL


 


Basophils #    (0-0.2)  k/uL


 


Hypochromasia    


 


Poikilocytosis    


 


Anisocytosis    


 


PT   9.5  (9.0-12.0)  sec


 


INR   1.0  (<1.2)  


 


APTT   20.9 L  (22.0-30.0)  sec


 


Sodium    (137-145)  mmol/L


 


Potassium    (3.5-5.1)  mmol/L


 


Chloride    ()  mmol/L


 


Carbon Dioxide    (22-30)  mmol/L


 


Anion Gap    mmol/L


 


BUN    (7-17)  mg/dL


 


Creatinine    (0.52-1.04)  mg/dL


 


Est GFR (CKD-EPI)AfAm    (>60 ml/min/1.73 sqM)  


 


Est GFR (CKD-EPI)NonAf    (>60 ml/min/1.73 sqM)  


 


Glucose    (74-99)  mg/dL


 


Calcium    (8.4-10.2)  mg/dL


 


Magnesium    (1.6-2.3)  mg/dL


 


Total Bilirubin    (0.2-1.3)  mg/dL


 


AST    (14-36)  U/L


 


ALT    (9-52)  U/L


 


Alkaline Phosphatase    ()  U/L


 


Total Creatine Kinase    ()  U/L


 


CK-MB (CK-2)    (0.0-2.4)  ng/mL


 


CK-MB (CK-2) Rel Index    


 


Troponin I    (0.000-0.034)  ng/mL


 


NT-Pro-B Natriuret Pep  19   pg/mL


 


Total Protein    (6.3-8.2)  g/dL


 


Albumin    (3.5-5.0)  g/dL














Disposition





<Joseph Dubon - Last Filed: 04/13/18 20:58>


Time of Disposition: 23:12





<Lester Raymundo - Last Filed: 04/13/18 23:12>


Clinical Impression: 


 Atypical chest pain, Dyspnea on exertion





Disposition: ADMITTED AS IP TO THIS HOSP


Referrals: 


ARCELIA العراقي III, MD [Primary Care Provider] - 1-2 days

## 2018-04-13 NOTE — US
EXAMINATION TYPE: US venous doppler duplex LE RT

 

DATE OF EXAM: 4/13/2018 9:09 PM

 

COMPARISON: Left leg only

 

CLINICAL HISTORY: Pain. Pt states increased pain and swelling right leg x 3 days/ Pt currently on blo
od thinners for previous PE

 

SIDE PERFORMED: Right  

 

TECHNIQUE:  The lower extremity deep venous system is examined utilizing real time linear array sonog
seth with graded compression, doppler sonography and color-flow sonography.

 

VESSELS IMAGED:

External Iliac Vein (EIV)

Common Femoral Vein

Deep Femoral Vein

Greater Saphenous Vein *

Femoral Vein

Popliteal Vein

Small Saphenous Vein *

Proximal Calf Veins

(* superficial vessels)

 

 

 

Right Leg:  Negative for DVT

 

 

 

 

IMPRESSION: Normal exam. No evidence of deep venous thrombosis in the left leg.

## 2018-04-14 LAB
CHOLEST SERPL-MCNC: 174 MG/DL (ref ?–200)
CK SERPL-CCNC: 124 U/L (ref 30–135)
CK SERPL-CCNC: 138 U/L (ref 30–135)
GLUCOSE BLD-MCNC: 106 MG/DL (ref 75–99)
GLUCOSE BLD-MCNC: 108 MG/DL (ref 75–99)
GLUCOSE BLD-MCNC: 166 MG/DL (ref 75–99)
GLUCOSE BLD-MCNC: 91 MG/DL (ref 75–99)
HBA1C MFR BLD: 8.2 % (ref 4–6)
HDLC SERPL-MCNC: 42 MG/DL (ref 40–60)
TRIGL SERPL-MCNC: 512 MG/DL (ref ?–150)
TROPONIN I SERPL-MCNC: <0.012 NG/ML (ref 0–0.03)
TROPONIN I SERPL-MCNC: <0.012 NG/ML (ref 0–0.03)

## 2018-04-14 RX ADMIN — INSULIN DETEMIR SCH UNIT: 100 INJECTION, SOLUTION SUBCUTANEOUS at 14:36

## 2018-04-14 RX ADMIN — CEFAZOLIN SCH MLS/HR: 330 INJECTION, POWDER, FOR SOLUTION INTRAMUSCULAR; INTRAVENOUS at 20:38

## 2018-04-14 RX ADMIN — BUDESONIDE AND FORMOTEROL FUMARATE DIHYDRATE SCH PUFF: 80; 4.5 AEROSOL RESPIRATORY (INHALATION) at 08:03

## 2018-04-14 RX ADMIN — ROPINIROLE SCH MG: 4 TABLET, FILM COATED ORAL at 21:09

## 2018-04-14 RX ADMIN — CEFAZOLIN SCH: 330 INJECTION, POWDER, FOR SOLUTION INTRAMUSCULAR; INTRAVENOUS at 19:28

## 2018-04-14 RX ADMIN — ASPIRIN SCH: 325 TABLET ORAL at 19:39

## 2018-04-14 RX ADMIN — BUDESONIDE AND FORMOTEROL FUMARATE DIHYDRATE SCH PUFF: 80; 4.5 AEROSOL RESPIRATORY (INHALATION) at 19:27

## 2018-04-14 RX ADMIN — NAPROXEN SCH MG: 250 TABLET ORAL at 12:33

## 2018-04-14 RX ADMIN — ESCITALOPRAM OXALATE SCH MG: 20 TABLET, FILM COATED ORAL at 21:09

## 2018-04-14 RX ADMIN — RIVAROXABAN SCH MG: 20 TABLET, FILM COATED ORAL at 01:28

## 2018-04-14 RX ADMIN — RIVAROXABAN SCH MG: 20 TABLET, FILM COATED ORAL at 21:09

## 2018-04-14 RX ADMIN — ATENOLOL SCH MG: 25 TABLET ORAL at 01:27

## 2018-04-14 RX ADMIN — KETOROLAC TROMETHAMINE SCH MG: 30 INJECTION, SOLUTION INTRAMUSCULAR at 23:41

## 2018-04-14 RX ADMIN — ESCITALOPRAM OXALATE SCH MG: 20 TABLET, FILM COATED ORAL at 01:27

## 2018-04-14 RX ADMIN — NITROGLYCERIN SCH: 20 OINTMENT TOPICAL at 00:39

## 2018-04-14 RX ADMIN — LEVOTHYROXINE SODIUM SCH MCG: 75 TABLET ORAL at 06:04

## 2018-04-14 RX ADMIN — ROPINIROLE SCH MG: 4 TABLET, FILM COATED ORAL at 01:28

## 2018-04-14 RX ADMIN — NITROGLYCERIN SCH: 20 OINTMENT TOPICAL at 03:58

## 2018-04-14 RX ADMIN — ATENOLOL SCH MG: 25 TABLET ORAL at 21:09

## 2018-04-14 RX ADMIN — FUROSEMIDE SCH MG: 40 TABLET ORAL at 12:34

## 2018-04-14 RX ADMIN — ASPIRIN SCH: 325 TABLET ORAL at 01:19

## 2018-04-14 RX ADMIN — NAPROXEN SCH MG: 250 TABLET ORAL at 01:28

## 2018-04-14 RX ADMIN — PANTOPRAZOLE SODIUM SCH MG: 40 TABLET, DELAYED RELEASE ORAL at 12:35

## 2018-04-14 RX ADMIN — INSULIN ASPART SCH: 100 INJECTION, SOLUTION INTRAVENOUS; SUBCUTANEOUS at 21:24

## 2018-04-14 RX ADMIN — KETOROLAC TROMETHAMINE SCH MG: 30 INJECTION, SOLUTION INTRAMUSCULAR at 21:08

## 2018-04-14 RX ADMIN — POTASSIUM CHLORIDE SCH MEQ: 750 TABLET, EXTENDED RELEASE ORAL at 21:09

## 2018-04-14 RX ADMIN — POTASSIUM CHLORIDE SCH MEQ: 750 TABLET, EXTENDED RELEASE ORAL at 01:27

## 2018-04-14 NOTE — XR
Right leg

 

HISTORY: Right leg pain, trauma

 

2 views of the right leg submitted on 4 images

 

Bone mineralization, joint spaces and alignment are maintained. There is a plantar calcaneal spur not
ed incidentally. Soft tissue swelling is noted. Small ossific density distal to the medial malleolus 
is well-corticated and not felt likely to be acute. Mild osteoarthritic change noted in the knee.

 

impression: No acute fracture or dislocation is evident. Soft tissue swelling. Additional findings ab
ove.

## 2018-04-14 NOTE — ECHOF
Referral Reason:



MEASUREMENTS

--------

HEIGHT: 162.6 cm

WEIGHT: 119.3 kg

BP: 120/67

RVIDd:   3.1 cm     (< 3.3)

IVSd:   1.0 cm     (0.6 - 1.1)

LVIDd:   4.3 cm     (3.9 - 5.3)

LVPWd:   1.1 cm     (0.6 - 1.1)

IVSs:   1.4 cm

LVIDs:   3.0 cm

LVPWs:   1.5 cm

LA Diam:   3.1 cm     (2.7 - 3.8)

LAESV Index (A-L):   21.51 ml/m

Ao Diam:   2.7 cm     (2.0 - 3.7)

AV Cusp:   1.8 cm     (1.5 - 2.6)

MV EXCURSION:   13.883 mm     (> 18.000)

MV EF SLOPE:   57 mm/s     (70 - 150)

EPSS:   0.5 cm

MV E Gerhard:   1.12 m/s

MV DecT:   148 ms

MV A Gerhard:   0.94 m/s

MV E/A Ratio:   1.19 

AV maxP.76 mmHg

AV meanP.21 mmHg

RAP:   5.00 mmHg

RVSP:   33.58 mmHg







FINDINGS

--------

Sinus rhythm.

This was a technically adequate study.

The left ventricular size is normal.   There is borderline concentric left ventricular hypertrophy.  
 Overall left ventricular systolic function is normal with, an EF between 55 - 60 %.

The right ventricle is normal in size.

Normal LA  size by volume 22+/-6 ml/m2.

The right atrium was not well visualized.

There is mild aortic valve sclerosis.   There is mild aortic stenosis present.   Peak/mean gradient a
cross the Aortic Valve is 18.76mmHg / 8.21mmHg.

The mitral valve is normal.   Mild mitral regurgitation is present.

Mild tricuspid regurgitation present.   Right ventricular systolic pressure is normal at < 35 mmHg.

The pulmonic valve was not well visualized.   There is no pulmonic regurgitation present.

The aortic root size is normal.

IVC Not well visulized.

There is no pericardial effusion.



CONCLUSIONS

--------

1. Sinus rhythm.

2. This was a technically adequate study.

3. The left ventricular size is normal.

4. There is borderline concentric left ventricular hypertrophy.

5. Overall left ventricular systolic function is normal with, an EF between 55 - 60 %.

6. Normal LA size by volume 22+/-6 ml/m2.

7. There is mild aortic valve sclerosis.

8. There is mild aortic stenosis present.

9. Mild mitral regurgitation is present.

10. Mild tricuspid regurgitation present.

11. Right ventricular systolic pressure is normal at < 35 mmHg.

12. The pulmonic valve was not well visualized.

13. There is no pulmonic regurgitation present.

14. The aortic root size is normal.

15. IVC Not well visulized.

16. There is no pericardial effusion.





SONOGRAPHER: Divya Merlos RDCS

## 2018-04-14 NOTE — P.HPIM
History of Present Illness





This is a pleasant 57 years old female with past medical history of asthma or 

cancer angina DVT GERD hypertension PE on 0 and hysterectomy


Patient states that 2 weeks ago she tripped admits to stay up when she fell and 

hurt her right leg, yesterday she was visiting her endocrinologist Dr. Laura 

who didn't X-ray of her right leg and showed healing the fracture as per patient


Today patient presents with a three-day history DYSPNEA and chest tightness


Patient was diagnosed with PE and she is on 0, but she stated she did not miss 

any dose


Patient does complain from cough at times but she feels better today 


patient was admitted for cardiology evaluation as well














Review of Systems





12 point systemic review were negative except as mentioned in the HPI





Past Medical History


Past Medical History: Asthma, Cancer, Chest Pain / Angina, Deep Vein Thrombosis 

(DVT), GERD/Reflux, Hypertension, Pneumonia, Pulmonary Embolus (PE), Skin 

Disorder


Additional Past Medical History / Comment(s): blood in stool, ca-thyroid, pe,pna

, rash on arms


History of Any Multi-Drug Resistant Organisms: None Reported


Past Surgical History: Breast Surgery, Hysterectomy


Additional Past Surgical History / Comment(s): thyroid removed, spleenectomy, 3 

lumps removed in breast. large lymphnoid removed from under the left arm, 

thyroid CA


Past Anesthesia/Blood Transfusion Reactions: No Reported Reaction


Past Psychological History: Anxiety


Smoking Status: Never smoker


Past Alcohol Use History: None Reported


Past Drug Use History: None Reported





- Past Family History


  ** mother


Family Medical History: Cancer


Additional Family Medical History / Comment(s): colon





  ** brother


Family Medical History: Cancer


Additional Family Medical History / Comment(s): liver, kidneys, pancreas, clots





  ** sister


Family Medical History: Cancer


Additional Family Medical History / Comment(s): breast ca, clots





Medications and Allergies


 Home Medications











 Medication  Instructions  Recorded  Confirmed  Type


 


Atenolol [Tenormin] 25 mg PO HS 02/05/15 04/13/18 History


 


Furosemide [Lasix] 40 mg PO QAM 02/05/15 04/13/18 History


 


Potassium Chloride [Klor-Con 10] 10 meq PO HS 02/05/15 04/13/18 History


 


Albuterol Inhaler [Ventolin Hfa 2 puff INHALATION RT-Q4H PRN 05/01/17 04/14/18 

History





Inhaler]    


 


Naproxen Sodium [Naproxen Sodium 550 mg PO BID 05/01/17 04/13/18 History





DS]    


 


Omeprazole Magnesium 20.6mg 1 cap PO DAILY 05/01/17 04/13/18 History


 


metFORMIN HCL 1,000 mg PO AC-BID 05/01/17 04/13/18 History


 


rOPINIRole HCL [Requip] 4 mg PO HS 05/03/17 04/13/18 History


 


Albiglutide [Tanzeum] 30 mg SQ Q7D 04/13/18 04/13/18 History


 


Empagliflozin [Jardiance] 10 mg PO HS 04/13/18 04/14/18 History


 


Escitalopram [Lexapro] 20 mg PO HS 04/13/18 04/13/18 History


 


Insulin Glargine,Hum.rec.anlog 20 unit SQ DAILY 04/13/18 04/13/18 History





[Lantus Solostar]    


 


Levothyroxine Sodium [Synthroid] 150 mcg PO MOTUWETHFRSA 04/13/18 04/13/18 

History


 


Levothyroxine Sodium [Synthroid] 225 mcg PO HAAS 04/13/18 04/13/18 History


 


Methocarbamol [Robaxin] 750 mg PO Q6H PRN 04/13/18 04/14/18 History


 


Mometasone/Formoterol [Dulera 100 2 puff INHALATION RT-BID 04/13/18 04/13/18 

History





Mcg/5 Mcg Inhaler]    


 


Rivaroxaban [Xarelto] 20 mg PO HS 04/13/18 04/13/18 History


 


traZODone HCL [Desyrel] 100 mg PO HS PRN 04/13/18 04/14/18 History











 Allergies











Allergy/AdvReac Type Severity Reaction Status Date / Time


 


latex Allergy  Rash/Hives Verified 04/14/18 00:01


 


sitagliptin [From Januvia] Allergy  Rash/Hives Verified 04/14/18 00:01














Physical Exam


Vitals: 


 Vital Signs











  Temp Pulse Pulse Resp BP BP Pulse Ox


 


 04/14/18 16:00  98.0 F   86  18   131/70  92 L


 


 04/14/18 12:00  97.7 F   93  16   118/61  94 L


 


 04/14/18 08:06        93 L


 


 04/14/18 08:00  97.7 F   87  18   120/67  94 L


 


 04/14/18 04:00     18   


 


 04/14/18 03:59  98.9 F   80  18   108/55  93 L


 


 04/14/18 00:00  98.1 F   92  18   147/74  92 L


 


 04/13/18 23:27  99.2 F  88   16  126/69   98


 


 04/13/18 22:00   83   16  126/69   95


 


 04/13/18 21:40  99.0 F  98   18  134/59   96


 


 04/13/18 18:58  98.9 F  105 H   20  132/65   95








 Intake and Output











 04/14/18 04/14/18 04/14/18





 06:59 14:59 22:59


 


Other:   


 


  Voiding Method  Toilet 


 


  Weight 119.7 kg  














Constitutional:       No acute distress, conversant, pleasant, obese


Eyes:         Anicteric sclerae, moist conjunctiva, no lid-lag


         PERRLA


         ENMT:          NC/AT


         Oropharynx clear, no erythema, exudates


Neck:         Supple, FROM, no masses, or JVD


         No carotid bruits


         No thyromegaly


Lungs:           Clear to auscultation


         Clear to percussion


         Normal respiratory effort, no accessory muscle use 


Cardiovascular:      Heart regular in rate and rhythm, 


         No murmurs, gallops, or rubs


         No peripheral edema


Abdominal:       Soft


         Nontender, no guarding, rebound or rigidity


         Abdomen moving with respiration


         Normoactive bowel sounds


         No hepatomegaly, No splenomegaly


   No palpable mass 


         No abdominal wall hernia noted 


Skin:          Normal temperature, tone, texture, turgor


         No induration


No subcutaneous nodules 


         No rash, lesions


No ulcers


Extremities:      No digital cyanosis 


         No clubbing


         Pedal pulses intact and symmetrical


         Radial pulses intact and symmetrical 


         Normal gait and station


         No calf tenderness 


Psychiatric:      Alert and oriented to person, place and time


         Appropriate affect


         Intact judgement         


Neuro:         Muscles Strength 5/5 in all 4 extremities


         Sensation to light touch grossly present throughout


         Cranial nerves II-XII grossly intact


         No focal sensory deficits








Results


CBC & Chem 7: 


 04/13/18 19:40





 04/13/18 19:40


Labs: 


 Abnormal Lab Results - Last 24 Hours (Table)











  04/13/18 04/13/18 04/13/18 Range/Units





  19:40 19:40 19:40 


 


WBC   12.3 H   (3.8-10.6)  k/uL


 


RBC   3.69 L   (3.80-5.40)  m/uL


 


Hgb   10.7 L   (11.4-16.0)  gm/dL


 


RDW   17.1 H   (11.5-15.5)  %


 


APTT     (22.0-30.0)  sec


 


Glucose    101 H  (74-99)  mg/dL


 


POC Glucose (mg/dL)     (75-99)  mg/dL


 


Hemoglobin A1c     (4.0-6.0)  %


 


Alkaline Phosphatase    154 H  ()  U/L


 


Total Creatine Kinase  142 H    ()  U/L


 


Triglycerides     (<150)  mg/dL














  04/13/18 04/13/18 04/14/18 Range/Units





  19:40 23:32 02:19 


 


WBC     (3.8-10.6)  k/uL


 


RBC     (3.80-5.40)  m/uL


 


Hgb     (11.4-16.0)  gm/dL


 


RDW     (11.5-15.5)  %


 


APTT  20.9 L    (22.0-30.0)  sec


 


Glucose     (74-99)  mg/dL


 


POC Glucose (mg/dL)   128 H   (75-99)  mg/dL


 


Hemoglobin A1c     (4.0-6.0)  %


 


Alkaline Phosphatase     ()  U/L


 


Total Creatine Kinase    138 H  ()  U/L


 


Triglycerides     (<150)  mg/dL














  04/14/18 04/14/18 04/14/18 Range/Units





  02:19 02:45 06:56 


 


WBC     (3.8-10.6)  k/uL


 


RBC     (3.80-5.40)  m/uL


 


Hgb     (11.4-16.0)  gm/dL


 


RDW     (11.5-15.5)  %


 


APTT     (22.0-30.0)  sec


 


Glucose     (74-99)  mg/dL


 


POC Glucose (mg/dL)    108 H  (75-99)  mg/dL


 


Hemoglobin A1c   8.2 H   (4.0-6.0)  %


 


Alkaline Phosphatase     ()  U/L


 


Total Creatine Kinase     ()  U/L


 


Triglycerides  512 H    (<150)  mg/dL














  04/14/18 Range/Units





  12:04 


 


WBC   (3.8-10.6)  k/uL


 


RBC   (3.80-5.40)  m/uL


 


Hgb   (11.4-16.0)  gm/dL


 


RDW   (11.5-15.5)  %


 


APTT   (22.0-30.0)  sec


 


Glucose   (74-99)  mg/dL


 


POC Glucose (mg/dL)  166 H  (75-99)  mg/dL


 


Hemoglobin A1c   (4.0-6.0)  %


 


Alkaline Phosphatase   ()  U/L


 


Total Creatine Kinase   ()  U/L


 


Triglycerides   (<150)  mg/dL














Thrombosis Risk Factor Assmnt





- Choose All That Apply


Each Factor Represents 1 point: Age 41-60 years, Obesity (BMI >25), Swollen 

legs (current)


Each Risk Factor Represents 3 Points: Family history of DVT/PE, History of DVT/

PE


Thrombosis Risk Factor Assessment Total Risk Factor Score: 9


Thrombosis Risk Factor Assessment Level: High Risk





Assessment and Plan


Assessment: 





-3 days episodes of chest tightness and dyspnea, mostly related to her 

psychological condition, patient she states she feels lonely and depressed, 

, and at times she thinks with Committing suicide, and with the plan by 

taking extra bubbles from her muscles


She is already on Lexapro


Patient is already on xarelto for her PE, however patient has CT in view of the 

thorax in the emergency room when it did show any evidence of thrombosis and or 

other pathology


Cardiologist evaluation is appreciated, as per staff patient has been cleared 

for discharge from cardiology perspective


Discussed with the staff, called psychiatric evaluation for the patient's


-Dehydration, she got recent CT contrast, she was on Naprosyn which was DC'd, 


We will start on IV fluids for hydration and to protect the kidney


Hold metformin for now for patient is at risk of acute kidney injury


-Leukocytosis, trend white BC


-DM type II, hold metformin continue with insulin and Jandiance , continue with 

ISS


-History of fall 2 weeks ago, she had pain in her right leg, check right leg x-

ray and PT/OT





Problem list and management plan are discussed with the patient and she 

verbalized understanding and acceptance

## 2018-04-15 VITALS — RESPIRATION RATE: 16 BRPM

## 2018-04-15 LAB
ANION GAP SERPL CALC-SCNC: 9 MMOL/L
BASOPHILS # BLD AUTO: 0.1 K/UL (ref 0–0.2)
BASOPHILS NFR BLD AUTO: 1 %
BUN SERPL-SCNC: 12 MG/DL (ref 7–17)
CALCIUM SPEC-MCNC: 8.9 MG/DL (ref 8.4–10.2)
CHLORIDE SERPL-SCNC: 108 MMOL/L (ref 98–107)
CO2 SERPL-SCNC: 28 MMOL/L (ref 22–30)
EOSINOPHIL # BLD AUTO: 0.6 K/UL (ref 0–0.7)
EOSINOPHIL NFR BLD AUTO: 6 %
ERYTHROCYTE [DISTWIDTH] IN BLOOD BY AUTOMATED COUNT: 3.73 M/UL (ref 3.8–5.4)
ERYTHROCYTE [DISTWIDTH] IN BLOOD: 17 % (ref 11.5–15.5)
GLUCOSE BLD-MCNC: 114 MG/DL (ref 75–99)
GLUCOSE BLD-MCNC: 127 MG/DL (ref 75–99)
GLUCOSE BLD-MCNC: 138 MG/DL (ref 75–99)
GLUCOSE BLD-MCNC: 95 MG/DL (ref 75–99)
GLUCOSE SERPL-MCNC: 138 MG/DL (ref 74–99)
HCT VFR BLD AUTO: 35.2 % (ref 34–46)
HGB BLD-MCNC: 10.8 GM/DL (ref 11.4–16)
LYMPHOCYTES # SPEC AUTO: 3.1 K/UL (ref 1–4.8)
LYMPHOCYTES NFR SPEC AUTO: 31 %
MCH RBC QN AUTO: 29 PG (ref 25–35)
MCHC RBC AUTO-ENTMCNC: 30.8 G/DL (ref 31–37)
MCV RBC AUTO: 94.4 FL (ref 80–100)
MONOCYTES # BLD AUTO: 0.6 K/UL (ref 0–1)
MONOCYTES NFR BLD AUTO: 6 %
NEUTROPHILS # BLD AUTO: 5.5 K/UL (ref 1.3–7.7)
NEUTROPHILS NFR BLD AUTO: 54 %
PLATELET # BLD AUTO: 437 K/UL (ref 150–450)
POTASSIUM SERPL-SCNC: 4.2 MMOL/L (ref 3.5–5.1)
SODIUM SERPL-SCNC: 145 MMOL/L (ref 137–145)
WBC # BLD AUTO: 10.2 K/UL (ref 3.8–10.6)

## 2018-04-15 RX ADMIN — INSULIN DETEMIR SCH UNIT: 100 INJECTION, SOLUTION SUBCUTANEOUS at 09:33

## 2018-04-15 RX ADMIN — RIVAROXABAN SCH MG: 20 TABLET, FILM COATED ORAL at 20:35

## 2018-04-15 RX ADMIN — PANTOPRAZOLE SODIUM SCH MG: 40 TABLET, DELAYED RELEASE ORAL at 09:32

## 2018-04-15 RX ADMIN — POTASSIUM CHLORIDE SCH MEQ: 750 TABLET, EXTENDED RELEASE ORAL at 20:35

## 2018-04-15 RX ADMIN — INSULIN ASPART SCH: 100 INJECTION, SOLUTION INTRAVENOUS; SUBCUTANEOUS at 18:44

## 2018-04-15 RX ADMIN — FUROSEMIDE SCH MG: 40 TABLET ORAL at 09:32

## 2018-04-15 RX ADMIN — INSULIN ASPART SCH: 100 INJECTION, SOLUTION INTRAVENOUS; SUBCUTANEOUS at 11:12

## 2018-04-15 RX ADMIN — ESCITALOPRAM OXALATE SCH MG: 20 TABLET, FILM COATED ORAL at 20:35

## 2018-04-15 RX ADMIN — BUDESONIDE AND FORMOTEROL FUMARATE DIHYDRATE SCH PUFF: 80; 4.5 AEROSOL RESPIRATORY (INHALATION) at 20:27

## 2018-04-15 RX ADMIN — ROPINIROLE SCH MG: 4 TABLET, FILM COATED ORAL at 20:35

## 2018-04-15 RX ADMIN — ASPIRIN SCH: 325 TABLET ORAL at 22:49

## 2018-04-15 RX ADMIN — CEFAZOLIN SCH: 330 INJECTION, POWDER, FOR SOLUTION INTRAMUSCULAR; INTRAVENOUS at 18:44

## 2018-04-15 RX ADMIN — INSULIN ASPART SCH: 100 INJECTION, SOLUTION INTRAVENOUS; SUBCUTANEOUS at 12:01

## 2018-04-15 RX ADMIN — CEFAZOLIN SCH: 330 INJECTION, POWDER, FOR SOLUTION INTRAMUSCULAR; INTRAVENOUS at 08:06

## 2018-04-15 RX ADMIN — ATENOLOL SCH MG: 25 TABLET ORAL at 20:35

## 2018-04-15 RX ADMIN — KETOROLAC TROMETHAMINE SCH MG: 30 INJECTION, SOLUTION INTRAMUSCULAR at 11:53

## 2018-04-15 RX ADMIN — INSULIN ASPART SCH: 100 INJECTION, SOLUTION INTRAVENOUS; SUBCUTANEOUS at 21:15

## 2018-04-15 RX ADMIN — BUDESONIDE AND FORMOTEROL FUMARATE DIHYDRATE SCH PUFF: 80; 4.5 AEROSOL RESPIRATORY (INHALATION) at 08:07

## 2018-04-15 RX ADMIN — KETOROLAC TROMETHAMINE SCH MG: 30 INJECTION, SOLUTION INTRAMUSCULAR at 05:49

## 2018-04-15 NOTE — P.PN
Subjective





Patient seen and examined at bedside


No new complaints





Objective





- Vital Signs


Vital signs: 


 Vital Signs











Temp  98.0 F   04/15/18 08:00


 


Pulse  76   04/15/18 08:00


 


Resp  16   04/15/18 08:00


 


BP  129/64   04/15/18 08:00


 


Pulse Ox  94 L  04/15/18 08:08








 Intake & Output











 04/14/18 04/15/18 04/15/18





 18:59 06:59 18:59


 


Other:   


 


  Voiding Method Toilet Toilet Toilet


 


  # Voids  1 














- Exam





Constitutional:       No acute distress, conversant, pleasant, obese


Eyes:         Anicteric sclerae, moist conjunctiva, no lid-lag


         PERRLA


         ENMT:          NC/AT


         Oropharynx clear, no erythema, exudates


Neck:         Supple, FROM, no masses, or JVD


         No carotid bruits


         No thyromegaly


Lungs:           Clear to auscultation


         Clear to percussion


         Normal respiratory effort, no accessory muscle use 


Cardiovascular:      Heart regular in rate and rhythm, 


         No murmurs, gallops, or rubs


         No peripheral edema


Abdominal:       Soft


         Nontender, no guarding, rebound or rigidity


         Abdomen moving with respiration


         Normoactive bowel sounds


         No hepatomegaly, No splenomegaly


   No palpable mass 


         No abdominal wall hernia noted 


Skin:          Normal temperature, tone, texture, turgor


         No induration


No subcutaneous nodules 


         No rash, lesions


No ulcers


Extremities:      No digital cyanosis 


         No clubbing


         Pedal pulses intact and symmetrical


         Radial pulses intact and symmetrical 


         Normal gait and station


         No calf tenderness 


Psychiatric:      Alert and oriented to person, place and time


         Appropriate affect


         Intact judgement         


Neuro:         Muscles Strength 5/5 in all 4 extremities


         Sensation to light touch grossly present throughout


         Cranial nerves II-XII grossly intact


         No focal sensory deficits








- Labs


CBC & Chem 7: 


 04/15/18 06:24





 04/15/18 06:24


Labs: 


 Abnormal Lab Results - Last 24 Hours (Table)











  04/14/18 04/14/18 04/14/18 Range/Units





  02:45 12:04 21:15 


 


RBC     (3.80-5.40)  m/uL


 


Hgb     (11.4-16.0)  gm/dL


 


MCHC     (31.0-37.0)  g/dL


 


RDW     (11.5-15.5)  %


 


Chloride     ()  mmol/L


 


Glucose     (74-99)  mg/dL


 


POC Glucose (mg/dL)   166 H  106 H  (75-99)  mg/dL


 


Hemoglobin A1c  8.2 H    (4.0-6.0)  %














  04/15/18 04/15/18 04/15/18 Range/Units





  06:24 06:24 07:04 


 


RBC  3.73 L    (3.80-5.40)  m/uL


 


Hgb  10.8 L    (11.4-16.0)  gm/dL


 


MCHC  30.8 L    (31.0-37.0)  g/dL


 


RDW  17.0 H    (11.5-15.5)  %


 


Chloride   108 H   ()  mmol/L


 


Glucose   138 H   (74-99)  mg/dL


 


POC Glucose (mg/dL)    138 H  (75-99)  mg/dL


 


Hemoglobin A1c     (4.0-6.0)  %














Assessment and Plan


Plan: 





-3 days episodes of chest tightness and dyspnea, mostly related to her 

psychological condition, patient she states she feels lonely and depressed, 

, and at times she thinks with Committing suicide, and with the plan by 

taking extra bubbles from her muscles


She is already on Lexapro


Patient is already on xarelto for her PE, however patient has CT scan of the 

thorax in the emergency room when it did show any evidence of thrombosis and or 

other pathology


Cardiologist evaluation is appreciated, as per staff patient has been cleared 

for discharge from cardiology perspective


Discussed with the staff, called psychiatric evaluation for the patient's


-Dehydration, she got recent CT contrast, she was on Naprosyn which was DC'd, 


We will start on IV fluids for hydration and to protect the kidney


Hold metformin for now for patient is at risk of acute kidney injury


-Leukocytosis, resolved


-DM type II, hold metformin continue with insulin and Jandiance , continue with 

ISS


-History of fall 2 weeks ago, she had pain in her right leg, check right leg x-

ray: Unremarkable and PT/OT

## 2018-04-15 NOTE — PN
PROGRESS NOTE



Mrs. Holbrook is a 57-year-old female with a prior history of pulmonary embolism, history

of hypertension, who presented with symptoms of not feeling well and fatigue.  She is

feeling better today.  Her breathing is better.  She has been ambulating.  She denying

any symptoms of chest pain.  She denies any dizziness or palpitation.  She denies any

nausea.  She continues to be in sinus mechanism.  She has underwent an x-ray of her

lower extremity that revealed no evidence of fracture or dislocation.



She continues to be at this time on atenolol 25 mg daily, Jardiance  10 mg daily,

Lexapro 20 mg daily, furosemide 40 mg daily, insulin, levothyroxine 0.15 mg daily,

Protonix, Xarelto 20 mg daily.



PHYSICAL EXAMINATION:

Blood pressure 104/50 with a heart rate in the 70s.

LUNGS:  Clear. Cardiovascular: Regular rate and rhythm.  S1, S2.  No S3.  No rub.

ABDOMEN:  Soft, obese, nontender.  Extremities mild ecchymoses and edema on the right

side.



LAB DATA:

BUN and creatinine 12 and 0.52, potassium 4.2, hemoglobin 10.8.  Echocardiogram

revealed preserved left ventricular size and systolic function.



IMPRESSION:

1. Chest discomfort, had atypical features for ischemic heart disease.  Probably

    noncardiac.

2. Diabetes mellitus.

3. History of pulmonary embolism, stable.

4. Hypertension.



RECOMMENDATION:

From the cardiac standpoint, she is stable.  She will be followed as an outpatient and

depending on her progress, further recommendation will be made.





MMODL / MICHAELN: [id number] / Job#: [id number]

## 2018-04-16 VITALS — DIASTOLIC BLOOD PRESSURE: 81 MMHG | SYSTOLIC BLOOD PRESSURE: 113 MMHG | HEART RATE: 70 BPM | TEMPERATURE: 98.1 F

## 2018-04-16 LAB
ANION GAP SERPL CALC-SCNC: 13 MMOL/L
BUN SERPL-SCNC: 11 MG/DL (ref 7–17)
CALCIUM SPEC-MCNC: 8.9 MG/DL (ref 8.4–10.2)
CHLORIDE SERPL-SCNC: 105 MMOL/L (ref 98–107)
CO2 SERPL-SCNC: 27 MMOL/L (ref 22–30)
GLUCOSE BLD-MCNC: 121 MG/DL (ref 75–99)
GLUCOSE BLD-MCNC: 131 MG/DL (ref 75–99)
GLUCOSE SERPL-MCNC: 140 MG/DL (ref 74–99)
POTASSIUM SERPL-SCNC: 4.2 MMOL/L (ref 3.5–5.1)
SODIUM SERPL-SCNC: 145 MMOL/L (ref 137–145)

## 2018-04-16 RX ADMIN — INSULIN DETEMIR SCH UNIT: 100 INJECTION, SOLUTION SUBCUTANEOUS at 09:32

## 2018-04-16 RX ADMIN — INSULIN ASPART SCH: 100 INJECTION, SOLUTION INTRAVENOUS; SUBCUTANEOUS at 12:55

## 2018-04-16 RX ADMIN — FUROSEMIDE SCH MG: 40 TABLET ORAL at 09:34

## 2018-04-16 RX ADMIN — CEFAZOLIN SCH: 330 INJECTION, POWDER, FOR SOLUTION INTRAMUSCULAR; INTRAVENOUS at 09:28

## 2018-04-16 RX ADMIN — LEVOTHYROXINE SODIUM SCH MCG: 75 TABLET ORAL at 06:45

## 2018-04-16 RX ADMIN — INSULIN ASPART SCH: 100 INJECTION, SOLUTION INTRAVENOUS; SUBCUTANEOUS at 09:28

## 2018-04-16 RX ADMIN — BUDESONIDE AND FORMOTEROL FUMARATE DIHYDRATE SCH PUFF: 80; 4.5 AEROSOL RESPIRATORY (INHALATION) at 06:54

## 2018-04-16 RX ADMIN — PANTOPRAZOLE SODIUM SCH MG: 40 TABLET, DELAYED RELEASE ORAL at 09:34

## 2018-04-30 ENCOUNTER — HOSPITAL ENCOUNTER (OUTPATIENT)
Dept: HOSPITAL 47 - RADUSWWP | Age: 58
End: 2018-04-30
Payer: COMMERCIAL

## 2018-04-30 DIAGNOSIS — R92.8: Primary | ICD-10-CM

## 2018-04-30 NOTE — USB
Reason for exam: follow-up at short interval from prior study.



History:

Patient is postmenopausal and has history of other cancer at age 51.

Family history of breast cancer in sister at age 66.

Benign excisional biopsy, October 7, 1997.

Taking other hormone.



Physical Findings:

Nurse Summary: left breast prominent nodes axilla upper outer quadrant, all soft, 

movable tissue (nurse ts).



US Breast LT

Left breast ultrasound includes all four quadrants, the retroareolar region and 

axilla. Finding demonstrates several oval lymph nodes measuring 7mm at 3 o'clock, 

5mm at 3 o'clock, 10mm at the axilla, 10 mm at the axilla and 14mm at the axilla. 

Increased or new from prior, however, adenopathy markedly from CT 6/22/16 and 

prior benign excisional biopsy.



These results were verbally communicated with the patient and result sheet given 

to the patient on 4/30/18.





ASSESSMENT: Benign, BI-RAD 2



RECOMMENDATION:

Clinical management of the left breast.

Manage patient on a clinical basis. If further concern CT guided biopsy on a deep 

axillary node could be performed. Continue follow up with Dr. Lorenzo.

## 2018-05-07 NOTE — P.DS
Providers


Date of admission: 


04/13/18 23:12





Attending physician: 


Mingo Inman





Consults: 





 





04/13/18 23:12


Consult Physician Urgent 


   Consulting Provider: [facility]


   Consult Reason/Comments: Chest heaviness, dyspnea with exertion


   Do you want consulting provider notified?: Yes





04/14/18 18:02


Consult Physician Routine 


   Consulting Provider: Mauricio Contreras Reason/Comments: depression


   Do you want consulting provider notified?: Yes











Primary care physician: 


ARCELIA Perkins [facility] Course: 





This is a pleasant 57 years old female with past medical history of asthma or 

cancer angina DVT GERD hypertension PE on xarelto and hysterectomy


Patient states that 2 weeks ago she tripped admits to stay up when she fell and 

hurt her right leg, yesterday she was visiting her endocrinologist Dr. Laura 

who did X-ray of her right leg and showed healing the fracture as per patient, 

in the hospital repeat right leg x-ray shows no fracture or dislocation


this time patient presents with a three-day history of DYSPNEA and chest 

tightness


Patient has h/o PE and she is on xarelto, but she stated she did not miss any 

dose; she had CT scan of the thorax in the emergency room when it did show any 

evidence of thrombosis and or other pathology


Cardiologist evaluated the patient, echo was done, patient was cleared by the 

cardiologist for discharge and follow-up as an outpatient








-3 days episodes of chest tightness and dyspnea, mostly related to her 

psychological condition, patient she states she feels lonely and depressed, 

, and at times she thinks with Committing suicide, and with the plan by 

taking extra tablets from her muscles, however these suicidal thoughts were 

from 2 months ago, currently patient denies any suicidal thinking or behavior, 

a psychiatrist has evaluated the patient, amended no need for psychiatric 

admission at this point, they put her back on her medication Lexapro which she 

forgot to take before coming to the hospital, she will follow up with her 

psychiatrist Dr. Gerardo as an outpatient by the end of the month, she is 

cleared by psychiatrist for discharge





-Dehydration, she got recent CT contrast, she was on Naprosyn which was DC'd, 


We will start on IV fluids for hydration and to protect the kidney, her 

creatinine on the day of discharge is 0.5, resume metformin and follow-up as an 

outpatient





-Leukocytosis, resolved


-DM type II, hold metformin continue with insulin and Jandiance , continue with 

ISS


-History of fall 2 weeks ago, she had pain in her right leg, check right leg x-

ray: Unremarkable and PT recommended home with no home care





problems list and management plan was discussed with the pt and she verbalized 

understanding and acceptance 





pt is found stable and can be discharged home but she needs f/u as outpt, pt 

counseled to f/u with pcp within one week and she agrees 











Constitutional:       No acute distress, conversant, pleasant, obese


Eyes:         Anicteric sclerae, moist conjunctiva, no lid-lag


         PERRLA


         ENMT:          NC/AT


         Oropharynx clear, no erythema, exudates


Neck:         Supple, FROM, no masses, or JVD


         No carotid bruits


         No thyromegaly


Lungs:           Clear to auscultation


         Clear to percussion


         Normal respiratory effort, no accessory muscle use 


Cardiovascular:      Heart regular in rate and rhythm, 


         No murmurs, gallops, or rubs


         No peripheral edema


Abdominal:       Soft


         Nontender, no guarding, rebound or rigidity


         Abdomen moving with respiration


         Normoactive bowel sounds


         No hepatomegaly, No splenomegaly


   No palpable mass 


         No abdominal wall hernia noted 


Skin:          Normal temperature, tone, texture, turgor


         No induration


No subcutaneous nodules 


         No rash, lesions


No ulcers


Extremities:      No digital cyanosis 


         No clubbing


         Pedal pulses intact and symmetrical


         Radial pulses intact and symmetrical 


         Normal gait and station


         No calf tenderness 


Psychiatric:      Alert and oriented to person, place and time


         Appropriate affect


         Intact judgement         


Neuro:         Muscles Strength 5/5 in all 4 extremities


         Sensation to light touch grossly present throughout


         Cranial nerves II-XII grossly intact


         No focal sensory deficits


Patient Condition at Discharge: Good





Plan - Discharge Summary


New Discharge Prescriptions: 


New


   Insulin Aspart [NovoLOG (formulary)] 0 unit SQ ACHS  vial


   Ranitidine HCl 150 mg PO HS #30 tab





Continue


   Furosemide [Lasix] 40 mg PO QAM


   Atenolol [Tenormin] 25 mg PO HS


   Potassium Chloride [Klor-Con 10] 10 meq PO HS


   Albuterol Inhaler [Ventolin Hfa Inhaler] 2 puff INHALATION RT-Q4H PRN


     PRN Reason: Shortness Of Breath


   metFORMIN HCL 1,000 mg PO AC-BID


   rOPINIRole HCL [Requip] 4 mg PO HS


   Mometasone/Formoterol [Dulera 100 Mcg/5 Mcg Inhaler] 2 puff INHALATION RT-BID


   Methocarbamol [Robaxin] 750 mg PO Q6H PRN


     PRN Reason: Muscle Pain


   Albiglutide [Tanzeum] 30 mg SQ Q7D


   traZODone HCL [Desyrel] 100 mg PO HS PRN


     PRN Reason: Insomnia


   Levothyroxine Sodium [Synthroid] 150 mcg PO MOTUWETHFRSA


   Insulin Glargine,Hum.rec.anlog [Lantus Solostar] 20 unit SQ DAILY


   Empagliflozin [Jardiance] 10 mg PO HS


   Rivaroxaban [Xarelto] 20 mg PO HS


   Levothyroxine Sodium [Synthroid] 225 mcg PO HAAS


   Escitalopram [Lexapro] 20 mg PO HS #30 tab





Discontinued


   Omeprazole Magnesium 20.6mg 1 cap PO DAILY


   Naproxen Sodium [Naproxen Sodium DS] 550 mg PO BID


Discharge Medication List





Atenolol [Tenormin] 25 mg PO HS 02/05/15 [History]


Furosemide [Lasix] 40 mg PO QAM 02/05/15 [History]


Potassium Chloride [Klor-Con 10] 10 meq PO HS 02/05/15 [History]


Albuterol Inhaler [Ventolin Hfa Inhaler] 2 puff INHALATION RT-Q4H PRN 05/01/17 [

History]


metFORMIN HCL 1,000 mg PO AC-BID 05/01/17 [History]


rOPINIRole HCL [Requip] 4 mg PO HS 05/03/17 [History]


Albiglutide [Tanzeum] 30 mg SQ Q7D 04/13/18 [History]


Empagliflozin [Jardiance] 10 mg PO HS 04/13/18 [History]


Insulin Glargine,Hum.rec.anlog [Lantus Solostar] 20 unit SQ DAILY 04/13/18 [

History]


Levothyroxine Sodium [Synthroid] 150 mcg PO MOTUWETHFRSA 04/13/18 [History]


Levothyroxine Sodium [Synthroid] 225 mcg PO HAAS 04/13/18 [History]


Methocarbamol [Robaxin] 750 mg PO Q6H PRN 04/13/18 [History]


Mometasone/Formoterol [Dulera 100 Mcg/5 Mcg Inhaler] 2 puff INHALATION RT-BID 04 /13/18 [History]


Rivaroxaban [Xarelto] 20 mg PO HS 04/13/18 [History]


traZODone HCL [Desyrel] 100 mg PO HS PRN 04/13/18 [History]


Escitalopram [Lexapro] 20 mg PO HS #30 tab 04/16/18 [Rx]


Insulin Aspart [NovoLOG (formulary)] 0 unit SQ ACHS  vial 04/16/18 [Rx]


Ranitidine HCl 150 mg PO HS #30 tab 04/16/18 [Rx]








Follow up Appointment(s)/Referral(s): 


Prem Martinez MD [STAFF PHYSICIAN] - 05/30/18 4:15 pm (Appointment is with Dr. Colindres,  NOT Juan.)


ARCELIA العراقي III, MD [Primary Care Provider] - 1-2 days


Patient Instructions/Handouts:  Chest Pain (DC)


Discharge Disposition: HOME SELF-CARE

## 2018-08-01 ENCOUNTER — HOSPITAL ENCOUNTER (OUTPATIENT)
Dept: HOSPITAL 47 - LABWHC1 | Age: 58
Discharge: HOME | End: 2018-08-01
Attending: INTERNAL MEDICINE
Payer: COMMERCIAL

## 2018-08-01 DIAGNOSIS — J84.10: Primary | ICD-10-CM

## 2018-08-01 LAB
ALBUMIN SERPL-MCNC: 4 G/DL (ref 3.5–5)
ALP SERPL-CCNC: 144 U/L (ref 38–126)
ALT SERPL-CCNC: 47 U/L (ref 9–52)
ANION GAP SERPL CALC-SCNC: 10 MMOL/L
AST SERPL-CCNC: 64 U/L (ref 14–36)
BUN SERPL-SCNC: 11 MG/DL (ref 7–17)
CALCIUM SPEC-MCNC: 9.1 MG/DL (ref 8.4–10.2)
CARDIOLIPIN IGG SER-IMP: NEGATIVE
CARDIOLIPIN IGM SER-IMP: NEGATIVE
CHLORIDE SERPL-SCNC: 103 MMOL/L (ref 98–107)
CK SERPL-CCNC: 100 U/L (ref 30–135)
CO2 SERPL-SCNC: 29 MMOL/L (ref 22–30)
DSDNA AB SER QL: NEGATIVE
DSDNA AB TITR SER: 1 IU/ML
ENA SCL70 AB SER IA-ACNC: 0.2 AI
GLUCOSE SERPL-MCNC: 169 MG/DL (ref 74–99)
PATH INTERP SPEC-IMP: NEGATIVE
POTASSIUM SERPL-SCNC: 4.3 MMOL/L (ref 3.5–5.1)
PROT SERPL-MCNC: 7.5 G/DL (ref 6.3–8.2)
SODIUM SERPL-SCNC: 142 MMOL/L (ref 137–145)

## 2018-08-01 PROCEDURE — 86225 DNA ANTIBODY NATIVE: CPT

## 2018-08-01 PROCEDURE — 36415 COLL VENOUS BLD VENIPUNCTURE: CPT

## 2018-08-01 PROCEDURE — 86255 FLUORESCENT ANTIBODY SCREEN: CPT

## 2018-08-01 PROCEDURE — 86140 C-REACTIVE PROTEIN: CPT

## 2018-08-01 PROCEDURE — 82550 ASSAY OF CK (CPK): CPT

## 2018-08-01 PROCEDURE — 85730 THROMBOPLASTIN TIME PARTIAL: CPT

## 2018-08-01 PROCEDURE — 85652 RBC SED RATE AUTOMATED: CPT

## 2018-08-01 PROCEDURE — 86147 CARDIOLIPIN ANTIBODY EA IG: CPT

## 2018-08-01 PROCEDURE — 86235 NUCLEAR ANTIGEN ANTIBODY: CPT

## 2018-08-01 PROCEDURE — 86038 ANTINUCLEAR ANTIBODIES: CPT

## 2018-08-01 PROCEDURE — 80053 COMPREHEN METABOLIC PANEL: CPT

## 2018-08-01 PROCEDURE — 85613 RUSSELL VIPER VENOM DILUTED: CPT

## 2018-08-01 PROCEDURE — 86431 RHEUMATOID FACTOR QUANT: CPT

## 2018-08-01 PROCEDURE — 86160 COMPLEMENT ANTIGEN: CPT

## 2018-08-02 LAB
APTT BLD: 34 SEC(S) (ref ?–43)
C-ANCA TITR SER: (no result) TITER
P-ANCA TITR SER IF: (no result) TITER
SCREEN DRVVT: 66 SEC(S) (ref ?–44)

## 2018-08-08 ENCOUNTER — HOSPITAL ENCOUNTER (OUTPATIENT)
Dept: HOSPITAL 47 - RADCTMAIN | Age: 58
Discharge: HOME | End: 2018-08-08
Attending: INTERNAL MEDICINE
Payer: COMMERCIAL

## 2018-08-08 DIAGNOSIS — I27.20: Primary | ICD-10-CM

## 2018-08-08 DIAGNOSIS — Z91.040: ICD-10-CM

## 2018-08-08 PROCEDURE — 93306 TTE W/DOPPLER COMPLETE: CPT

## 2018-08-08 PROCEDURE — 71250 CT THORAX DX C-: CPT

## 2018-08-08 NOTE — CT
EXAMINATION TYPE: CT chest wo con

 

DATE OF EXAM: 8/8/2018

 

COMPARISON: CTA chest April 13, 2018 and older CTs

 

HISTORY: Patient complains of difficulty breathing.

 

CT DLP: 1130.1 mGycm.  Automated Exposure Control for Dose Reduction was Utilized.

 

 

TECHNIQUE:  CT scan of the thorax is performed without IV contrast. High-resolution protocol with 0.1
 mm sequences obtained at 10 mm intervals in supine and prone technique.

 

FINDINGS:

 

LUNGS: No suspicious reticulation, fibrosis, or focal distortion is identified. No pleural effusion o
r pneumothorax is present bilaterally. No obvious new suspicious parenchymal masses are noted. No sig
nificant bronchiectasis is present. No worrisome new consolidation is seen.

 

MEDIASTINUM: Lack of IV contrast and protocol is noted to limit evaluation for mediastinal and especi
ally hilar adenopathy. There are no definitive greater than 1 cm hilar or mediastinal lymph nodes.   
No cardiomegaly or pericardial effusion is seen. 

 

OTHER: Moderate multilevel spurring in thoracic spine is redemonstrated.

 

IMPRESSION: No significant underlying chronic lung disease or fibrosis.

## 2018-08-08 NOTE — ECHOF
Referral Reason:J84.112 Pulmonary fibrosis,I27.2 pulmonary HTN



MEASUREMENTS

--------

HEIGHT: 163.0 cm

WEIGHT: 119.3 kg

BP: 

IVSd:   1.0 cm     (0.6 - 1.1)

LVIDd:   3.4 cm     (3.9 - 5.3)

LVPWd:   1.0 cm     (0.6 - 1.1)

IVSs:   1.8 cm

LVIDs:   2.3 cm

LVPWs:   1.4 cm

Ao Diam:   2.7 cm     (2.0 - 3.7)

AV Cusp:   2.0 cm     (1.5 - 2.6)

LA Diam:   3.5 cm     (2.7 - 3.8)

MV E Gerhard:   0.51 m/s

MV DecT:   182 ms

MV A Gerhard:   0.87 m/s

MV E/A Ratio:   0.58 

RAP:   5.00 mmHg

RVSP:   10.82 mmHg







FINDINGS

--------

Resting tachycardia (HR>100bpm).

This was a technically difficult study with suboptimal views.

The left ventricular size is normal.   Left ventricular wall thickness is normal.   Overall left vent
ricular systolic function is normal with, an EF between 55 - 60 %.

The RV was not well visualized.

The left atrium was not well visualized.

The right atrium was not well visualized.

Lumason used

The aortic valve was not well visualized.

The mitral valve was not well visualized.   There is trace mitral regurgitation.

The tricuspid valve was not well visualized.   Trace tricuspid regurgitation present.

Pulmonic valve appears structurally normal.

The aortic root, ascending aorta and aortic arch are normal.



CONCLUSIONS

--------

1. Resting tachycardia (HR>100bpm).

2. This was a technically difficult study with suboptimal views.

3. The left ventricular size is normal.

4. Left ventricular wall thickness is normal.

5. Overall left ventricular systolic function is normal with, an EF between 55 - 60 %.

6. The RV was not well visualized.

7. The left atrium was not well visualized.

8. The right atrium was not well visualized.

9. Lumason used

10. The aortic valve was not well visualized.

11. The mitral valve was not well visualized.

12. There is trace mitral regurgitation.

13. The tricuspid valve was not well visualized.

14. Trace tricuspid regurgitation present.

15. Pulmonic valve appears structurally normal.

16. The aortic root, ascending aorta and aortic arch are normal.





SONOGRAPHER: Ruby Azul RDCS

## 2019-04-01 ENCOUNTER — HOSPITAL ENCOUNTER (OUTPATIENT)
Dept: HOSPITAL 47 - ORWHC2ENDO | Age: 59
Discharge: HOME | End: 2019-04-01
Payer: COMMERCIAL

## 2019-04-01 VITALS — HEART RATE: 85 BPM | DIASTOLIC BLOOD PRESSURE: 72 MMHG | SYSTOLIC BLOOD PRESSURE: 113 MMHG

## 2019-04-01 VITALS — TEMPERATURE: 98 F | RESPIRATION RATE: 18 BRPM

## 2019-04-01 VITALS — BODY MASS INDEX: 46.5 KG/M2

## 2019-04-01 DIAGNOSIS — K57.30: ICD-10-CM

## 2019-04-01 DIAGNOSIS — Z91.040: ICD-10-CM

## 2019-04-01 DIAGNOSIS — Z80.0: ICD-10-CM

## 2019-04-01 DIAGNOSIS — K21.9: ICD-10-CM

## 2019-04-01 DIAGNOSIS — Z79.891: ICD-10-CM

## 2019-04-01 DIAGNOSIS — Z79.01: ICD-10-CM

## 2019-04-01 DIAGNOSIS — E66.01: ICD-10-CM

## 2019-04-01 DIAGNOSIS — Z86.010: ICD-10-CM

## 2019-04-01 DIAGNOSIS — Z86.718: ICD-10-CM

## 2019-04-01 DIAGNOSIS — Z79.890: ICD-10-CM

## 2019-04-01 DIAGNOSIS — I10: ICD-10-CM

## 2019-04-01 DIAGNOSIS — Z79.899: ICD-10-CM

## 2019-04-01 DIAGNOSIS — J45.909: ICD-10-CM

## 2019-04-01 DIAGNOSIS — E11.9: ICD-10-CM

## 2019-04-01 DIAGNOSIS — Z85.850: ICD-10-CM

## 2019-04-01 DIAGNOSIS — Z12.11: Primary | ICD-10-CM

## 2019-04-01 DIAGNOSIS — Z79.4: ICD-10-CM

## 2019-04-01 DIAGNOSIS — Z88.8: ICD-10-CM

## 2019-04-01 LAB
GLUCOSE BLD-MCNC: 168 MG/DL (ref 75–99)
GLUCOSE BLD-MCNC: 176 MG/DL (ref 75–99)

## 2019-04-01 PROCEDURE — 45378 DIAGNOSTIC COLONOSCOPY: CPT

## 2019-04-01 RX ADMIN — POTASSIUM CHLORIDE SCH MLS: 14.9 INJECTION, SOLUTION INTRAVENOUS at 07:48

## 2019-04-01 RX ADMIN — POTASSIUM CHLORIDE SCH MLS: 14.9 INJECTION, SOLUTION INTRAVENOUS at 07:47

## 2019-04-01 NOTE — P.PCN
Date of Procedure: 04/01/19


Procedure(s) Performed: 


Procedure: Total colonoscopy.





Preoperative diagnosis: Screening for neoplasia.





Postoperative diagnosis: Sigmoid diverticulosis with no evidence of acute 

diverticulitis, strictures, polyps or cancer.





Preparation: HalfLytely prep.





Sedation: Was provided by anesthesia.





Brief clinical history: The patient is a 58-year-old female with history of 

colon cancer in her mother at personal history of polyps, last colonoscopy in 

2015, is scheduled for this evaluation for screening for neoplasia.  She has 

occasional blood on the toilet tissue, otherwise, she has no abdominal 

complaints, significant bleeding or anemia..





Procedure: With the patient on her left lateral decubitus position and after 

informed consent and adequate sedation, the perianal area was inspected and it 

did not show any fissures or fistulas.  There were no masses felt on digital 

rectal examination.  The Olympus CFH 190L video colonoscope was then inserted in

the rectum in the usual fashion and advanced to the cecum.  The preparation was 

less than ideal with thick fecal secretions and fecal debris that could not be 

washed off totally.  There was no large polyps or tumors.  The mucosa appeared 

healthy.  Several diverticular orifices were seen scattered in the sigmoid with 

no evidence of acute diverticulitis or strictures.  No evidence of bleeding.





The patient tolerated the procedure well.





Plan: The patient was reassured.  Discussed dietary measures.  Because of the 

less than ideal preparation, I suggested repeat exam in 3 years.  She will 

follow-up with you as planned.

## 2019-04-30 ENCOUNTER — HOSPITAL ENCOUNTER (OUTPATIENT)
Dept: HOSPITAL 47 - RADMAMWWP | Age: 59
Discharge: HOME | End: 2019-04-30
Payer: COMMERCIAL

## 2019-04-30 DIAGNOSIS — N64.4: Primary | ICD-10-CM

## 2019-04-30 DIAGNOSIS — R92.8: ICD-10-CM

## 2019-04-30 DIAGNOSIS — N63.13: ICD-10-CM

## 2019-04-30 PROCEDURE — 77066 DX MAMMO INCL CAD BI: CPT

## 2019-04-30 NOTE — MM
Reason for exam: additional evaluation requested from prior study.

Last mammogram was performed 1 year and 3 months ago.



History:

Patient is postmenopausal and has history of other cancer at age 51.

Family history of breast cancer in sister at age 66.

Benign excisional biopsy, October 7, 1997.

Taking other hormone.



Physical Findings:

Nurse did not find any significant physical abnormalities on exam.



[facility] Diagnostic Mammo w CAD ELENA

Bilateral CC and MLO view(s) were taken.

Prior study comparison: January 15, 2018, bilateral MG diagnostic mammo w CAD ELENA.

September 2, 2016, bilateral MG diagnostic mammo w CAD ELENA.

There are scattered fibroglandular densities.  No suspicious abnormality. Post 

excisional change on the right breast.



These results were verbally communicated with the patient and result sheet given 

to the patient on 4/30/19.





ASSESSMENT: Incomplete: need additional imaging evaluation, BI-RAD 0



RECOMMENDATION:

Ultrasound of the right breast.

(pain)

## 2019-06-14 ENCOUNTER — HOSPITAL ENCOUNTER (OUTPATIENT)
Dept: HOSPITAL 47 - RADNMMAIN | Age: 59
Discharge: HOME | End: 2019-06-14
Payer: COMMERCIAL

## 2019-06-14 DIAGNOSIS — R07.9: ICD-10-CM

## 2019-06-14 DIAGNOSIS — R94.39: Primary | ICD-10-CM

## 2019-06-14 PROCEDURE — 93017 CV STRESS TEST TRACING ONLY: CPT

## 2019-06-14 PROCEDURE — 78452 HT MUSCLE IMAGE SPECT MULT: CPT

## 2019-06-14 NOTE — NM
EXAMINATION TYPE: NM stress lexiscan cardiolite

 

DATE OF EXAM: 6/14/2019

 

COMPARISON: NONE

 

HISTORY: 58-year-old female chest pain and difficulty in breathing. Palpitations, hypertension, diabe
cyrus, hypercholesterolemia, family history, asthma.

 

TECHNIQUE:  After the intravenous administration of 9.98 mCi Tc 99m Sestamibi - Cardiolite resting SP
ECT images acquired 45 minutes post injection. 

 

The patient received 0.4mg Lexiscan, 26.3 mCi Tc 99m Sestamibi - Stress images obtained 50 minutes po
st injection 

 

FINDINGS:  

 

Review of stress and rest SPECT images demonstrates perfusion defects along the inferior and inferola
teral wall. These perfusion defects are more pronounced on rest imaging suggesting possible diaphragm
atic attenuation. No convincing reversibility is identified.  Gated analysis demonstrates estimated l
eft ventricular ejection fraction of 65 %.  TID  calculated at 0.73, within normal limits.

 

 

 

IMPRESSION: 

Large area of perfusion defect along the inferior and inferolateral wall appears more pronounced on r
est. Findings could reflect prior infarct in combination with diaphragmatic attenuation. No convincin
g reversibility is seen. Further workup as clinically indicated. LVEF estimated at 65 %.

## 2019-06-14 NOTE — EST
EXERCISE STRESS



AGE:

58



SEX:

F



HT:

63"



WT:

273



PROTOCOL:

Lexiscan Cardiolite Stress Test



HEART RATE REST:

89



BLOOD PRESSURE REST:

103/67



MAXIMUM HEART RATE ACHIEVED:

107



MAXIMUM BLOOD PRESSURE:

104/66



85% MPHR:

139



100% MPHR:

163



INDICATIONS:

Chest pain, difficulty in breathing.



CLINICAL INFORMATION:

Baseline rhythm is sinus mechanism, rate of 89, normal axis and intervals, normal

echocardiogram.  Baseline blood pressure 103/67 mmHg. Patient received injection of

Lexiscan, electrocardiograph monitoring revealed no evidence of diagnostic ischemic ST

deviation.  Cardiolite was injected per protocol.



CONCLUSION:

1. Nondiagnostic electrocardiograph stress testing.

2. Nuclear images will be reported separately.





MMODL / IJN: [id number] / Job#: [id number]

## 2019-07-16 ENCOUNTER — HOSPITAL ENCOUNTER (OUTPATIENT)
Dept: HOSPITAL 47 - CATHCVL | Age: 59
Discharge: HOME | End: 2019-07-16
Attending: INTERNAL MEDICINE
Payer: COMMERCIAL

## 2019-07-16 VITALS — TEMPERATURE: 98 F | HEART RATE: 95 BPM

## 2019-07-16 VITALS — SYSTOLIC BLOOD PRESSURE: 113 MMHG | DIASTOLIC BLOOD PRESSURE: 74 MMHG

## 2019-07-16 VITALS — BODY MASS INDEX: 47.8 KG/M2

## 2019-07-16 VITALS — RESPIRATION RATE: 18 BRPM

## 2019-07-16 DIAGNOSIS — E78.2: ICD-10-CM

## 2019-07-16 DIAGNOSIS — I10: ICD-10-CM

## 2019-07-16 DIAGNOSIS — E03.9: ICD-10-CM

## 2019-07-16 DIAGNOSIS — Z91.040: ICD-10-CM

## 2019-07-16 DIAGNOSIS — E78.00: ICD-10-CM

## 2019-07-16 DIAGNOSIS — Z79.890: ICD-10-CM

## 2019-07-16 DIAGNOSIS — Z79.01: ICD-10-CM

## 2019-07-16 DIAGNOSIS — Z79.899: ICD-10-CM

## 2019-07-16 DIAGNOSIS — R06.09: ICD-10-CM

## 2019-07-16 DIAGNOSIS — Z79.4: ICD-10-CM

## 2019-07-16 DIAGNOSIS — E66.9: ICD-10-CM

## 2019-07-16 DIAGNOSIS — R00.2: ICD-10-CM

## 2019-07-16 DIAGNOSIS — R94.39: ICD-10-CM

## 2019-07-16 DIAGNOSIS — E11.51: ICD-10-CM

## 2019-07-16 DIAGNOSIS — R07.89: Primary | ICD-10-CM

## 2019-07-16 DIAGNOSIS — I27.82: ICD-10-CM

## 2019-07-16 DIAGNOSIS — Z79.1: ICD-10-CM

## 2019-07-16 DIAGNOSIS — Z82.49: ICD-10-CM

## 2019-07-16 LAB
GLUCOSE BLD-MCNC: 136 MG/DL (ref 75–99)
GLUCOSE BLD-MCNC: 170 MG/DL (ref 75–99)

## 2019-07-16 PROCEDURE — 93458 L HRT ARTERY/VENTRICLE ANGIO: CPT

## 2019-07-16 RX ADMIN — MIDAZOLAM HYDROCHLORIDE ONE MG: 1 INJECTION, SOLUTION INTRAMUSCULAR; INTRAVENOUS at 07:59

## 2019-07-16 RX ADMIN — MIDAZOLAM HYDROCHLORIDE ONE MG: 1 INJECTION, SOLUTION INTRAMUSCULAR; INTRAVENOUS at 07:54

## 2019-07-16 NOTE — CC
CARDIAC CATHETERIZATION REPORT



CARDIAC CATHETERIZATION PROCEDURE NOTE:

Ms. Holbrook is a 58-year-old female known history of hypertension, hyperlipidemia,

diabetes mellitus, who has been complaining of progressive dyspnea on exertion as well

as chest discomfort with activity. In view of that, she had a myocardial perfusion

imaging that revealed that was reported to be an abnormal in the inferolateral wall.

In view of that, recommendation made regarding cardiac catheterization. The procedures,

risks and complications were discussed with the patient who is in full understanding

and agreement.



PROCEDURE:

Patient was brought to the cath lab in the fasting semi-sedated state after receiving

fentanyl and Benadryl and achieving moderate conscious sedated state.  Using Xylocaine

anesthesia and Seldinger technique, a 6-Libyan sheath was introduced in the right

radial artery.  Selective right and left coronary angiography were performed using 5-

Libyan 3 and half bend right and left Gracie catheter.  Multiple views of the coronary

artery including hemiaxial views obtained following that 5-Libyan tight pigtail

catheter was introduced in the left ventricle and a 30 degree FERRIS view of the left

ventricle was obtained.  Following that, catheter and sheath were removed.  Hemostasis

was obtained with deployment of a TR band.  There was no immediate complication.

Patient returned to her room in stable condition.  Of note, the patient received 5000

units of intravenous heparin as well as intra-arterial verapamil.



FINDINGS:

LEFT MAIN:  This is a large-sized vessel bifurcating into left circumflex, and left

anterior descending artery.  Left main coronary artery has no evidence of high-grade

stenosis.



LEFT ANTERIOR DESCENDING ARTERY:  This is a large-sized vessel reaching toward the apex

with a wraparound apex segment giving rise to two diagonal branches, the first one is

very proximal, small in caliber. The left circumflex after the left anterior descending

artery as well as branches have no evidence of obstructive coronary artery disease.



LEFT CIRCUMFLEX:  This is a large codominant vessel giving rise to an obtuse marginal

branch in the mid segment, distally bifurcating into PDA and posterolateral segment and

branches.  The left circumflex as well as branches have no evidence of obstructive

coronary artery disease.



RIGHT CORONARY ARTERY:  This is a small codominant vessel, giving rise to a small PDA.

It gives a  branch that appears to be an ectopic branch.  The right coronary artery and

its branches have no evidence of obstructive coronary artery disease.



LEFT VENTRICULOGRAM:  Left ventriculogram was performed in 30 degree FERRIS view and

revealed normal left ventricular size and systolic function.  Ejection fraction 60%.

There was no significant mitral regurgitation.



HEMODYNAMICS:  There was no gradient across the aortic valve. The left ventricular end-

diastolic pressure was 12 mmHg.



CONCLUSION:

1. Normal coronary arteries.

2. Normal left ventricular size and systolic function.



RECOMMENDATION:

In view of finding anatomy, I recommend continue medical therapy with aggressive risk

modifications being initiated.  Those findings and recommendation were discussed with

the patient and her family who are in full understanding and agreement.



Duration of procedure is 18 minutes.





ERICK / MICHAELN: [id number] / Job#: [id number]

## 2019-10-10 ENCOUNTER — HOSPITAL ENCOUNTER (OUTPATIENT)
Dept: HOSPITAL 47 - BARWHC3 | Age: 59
Discharge: HOME | End: 2019-10-10
Payer: COMMERCIAL

## 2019-10-10 VITALS — BODY MASS INDEX: 46 KG/M2

## 2019-10-10 VITALS — DIASTOLIC BLOOD PRESSURE: 85 MMHG | SYSTOLIC BLOOD PRESSURE: 136 MMHG | HEART RATE: 114 BPM | TEMPERATURE: 97.7 F

## 2019-10-10 DIAGNOSIS — K50.90: ICD-10-CM

## 2019-10-10 DIAGNOSIS — K76.9: ICD-10-CM

## 2019-10-10 DIAGNOSIS — N19: ICD-10-CM

## 2019-10-10 DIAGNOSIS — E89.1: ICD-10-CM

## 2019-10-10 DIAGNOSIS — K90.9: ICD-10-CM

## 2019-10-10 DIAGNOSIS — E55.9: ICD-10-CM

## 2019-10-10 DIAGNOSIS — E21.1: ICD-10-CM

## 2019-10-10 DIAGNOSIS — D50.9: ICD-10-CM

## 2019-10-10 DIAGNOSIS — E66.01: Primary | ICD-10-CM

## 2019-10-10 LAB
ALBUMIN SERPL-MCNC: 4.1 G/DL (ref 3.8–4.9)
ALBUMIN/GLOB SERPL: 1.52 G/DL (ref 1.6–3.17)
ALP SERPL-CCNC: 140 U/L (ref 41–126)
ALT SERPL-CCNC: 28 U/L (ref 8–44)
ANION GAP SERPL CALC-SCNC: 9.7 MMOL/L (ref 4–12)
APTT BLD: 23.8 SEC (ref 22–30)
AST SERPL-CCNC: 31 U/L (ref 13–35)
BUN SERPL-SCNC: 11 MG/DL (ref 9–27)
BUN/CREAT SERPL: 22 RATIO (ref 12–20)
CALCIUM SPEC-MCNC: 9.5 MG/DL (ref 8.7–10.3)
CHLORIDE SERPL-SCNC: 104 MMOL/L (ref 96–109)
CHOLEST SERPL-MCNC: 153 MG/DL (ref 0–200)
CO2 SERPL-SCNC: 30.3 MMOL/L (ref 21.6–31.8)
ERYTHROCYTE [DISTWIDTH] IN BLOOD BY AUTOMATED COUNT: 3.76 M/UL (ref 3.8–5.4)
ERYTHROCYTE [DISTWIDTH] IN BLOOD: 18.7 % (ref 11.5–15.5)
FERRITIN SERPL-MCNC: 5.7 NG/ML (ref 10–291)
GLOBULIN SER CALC-MCNC: 2.7 G/DL (ref 1.6–3.3)
GLUCOSE SERPL-MCNC: 81 MG/DL (ref 70–110)
HBA1C MFR BLD: 7.4 % (ref 4–6)
HCT VFR BLD AUTO: 34.4 % (ref 34–46)
HDLC SERPL-MCNC: 65 MG/DL (ref 40–60)
HGB BLD-MCNC: 10.4 GM/DL (ref 11.4–16)
INR PPP: 0.9 (ref ?–1.2)
LDLC SERPL CALC-MCNC: 58.6 MG/DL (ref 0–131)
MAGNESIUM SPEC-SCNC: 1.6 MG/DL (ref 1.5–2.4)
MCH RBC QN AUTO: 27.6 PG (ref 25–35)
MCHC RBC AUTO-ENTMCNC: 30.2 G/DL (ref 31–37)
MCV RBC AUTO: 91.6 FL (ref 80–100)
PLATELET # BLD AUTO: 578 K/UL (ref 150–450)
POTASSIUM SERPL-SCNC: 4.3 MMOL/L (ref 3.5–5.5)
PROT SERPL-MCNC: 6.8 G/DL (ref 6.2–8.2)
PT BLD: 10 SEC (ref 9–12)
SODIUM SERPL-SCNC: 144 MMOL/L (ref 135–145)
TRIGL SERPL-MCNC: 147 MG/DL (ref 0–149)
VLDLC SERPL CALC-MCNC: 29.4 MG/DL (ref 5–40)
WBC # BLD AUTO: 12.4 K/UL (ref 3.8–10.6)

## 2019-10-10 PROCEDURE — 84630 ASSAY OF ZINC: CPT

## 2019-10-10 PROCEDURE — 84425 ASSAY OF VITAMIN B-1: CPT

## 2019-10-10 PROCEDURE — 83540 ASSAY OF IRON: CPT

## 2019-10-10 PROCEDURE — 85610 PROTHROMBIN TIME: CPT

## 2019-10-10 PROCEDURE — 82728 ASSAY OF FERRITIN: CPT

## 2019-10-10 PROCEDURE — 83036 HEMOGLOBIN GLYCOSYLATED A1C: CPT

## 2019-10-10 PROCEDURE — 85027 COMPLETE CBC AUTOMATED: CPT

## 2019-10-10 PROCEDURE — 80061 LIPID PANEL: CPT

## 2019-10-10 PROCEDURE — 84255 ASSAY OF SELENIUM: CPT

## 2019-10-10 PROCEDURE — 83735 ASSAY OF MAGNESIUM: CPT

## 2019-10-10 PROCEDURE — 84100 ASSAY OF PHOSPHORUS: CPT

## 2019-10-10 PROCEDURE — 82746 ASSAY OF FOLIC ACID SERUM: CPT

## 2019-10-10 PROCEDURE — 36415 COLL VENOUS BLD VENIPUNCTURE: CPT

## 2019-10-10 PROCEDURE — 80053 COMPREHEN METABOLIC PANEL: CPT

## 2019-10-10 PROCEDURE — 82306 VITAMIN D 25 HYDROXY: CPT

## 2019-10-10 PROCEDURE — 83970 ASSAY OF PARATHORMONE: CPT

## 2019-10-10 PROCEDURE — 82525 ASSAY OF COPPER: CPT

## 2019-10-10 PROCEDURE — 83550 IRON BINDING TEST: CPT

## 2019-10-10 PROCEDURE — 85730 THROMBOPLASTIN TIME PARTIAL: CPT

## 2019-10-10 PROCEDURE — 84590 ASSAY OF VITAMIN A: CPT

## 2019-10-10 PROCEDURE — 82607 VITAMIN B-12: CPT

## 2019-10-10 PROCEDURE — 84134 ASSAY OF PREALBUMIN: CPT

## 2019-10-10 PROCEDURE — 84443 ASSAY THYROID STIM HORMONE: CPT

## 2019-10-10 PROCEDURE — 99211 OFF/OP EST MAY X REQ PHY/QHP: CPT

## 2019-10-10 NOTE — P.HPBAR
Bariatric H&P





- History & Physicial


H&P Date: 10/10/19


History & Physicial: 


Visit/CC: initial clinic visit





Patient initial contact: 





Initial weight: 


Initial weight in pounds: 


Height: 5 ft 4 in


Initial BMI: 





Last weight: 





Current weight: 121.563 kg


Current weight in pounds: 268.00


Current BMI: 46.0





Ideal body weight (based on NIH guidelines): 54.431 kg





Excess body weight loss: 








The patient is a 59 year-old F who presents for Bariatric Assessment.








HPI: She is looking into weight loss operations. She does not want the sleeve. 

She is looking into the band. Her highest weight 275 pounds. She has tried 

Atkins and calorie restrictions. Most weight loss of 30 pounds. She has no fami

ly history of obesity. She has diabetes and thyroid conditions with thyroid. She

still has her gallbladder. 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 She has heart burn and takes medication 

for it where after 3 days her symptoms are worse. She has past history of DVT to

her lungs. She has family history of PE to her lungs and had 6 and was in the 

ICU. She has troubles with blood thinners. No IBD in family. No stomach or 

esophageal cancer. She had a colonoscopy 6 months ago. Past scope over 5 years 

ago. She does she heart doctor, Dr. Kyle and needed a heart cath and EKG in 

September 2019. No food intolerance. She had thyroid cancer. 





ABDOMEN: No pain.








ASSESSMENT:


1. Morbid obesity








PLAN:


1. Must see Dr. Alarcon pulmonologist


2. Cardiologist


3. Hematologist. 





Past Medical History


Past Medical History: Asthma, Blood Disorder, Cancer, Chest Pain / Angina, 

Diabetes Mellitus, Deep Vein Thrombosis (DVT), GERD/Reflux, Hypertension, 

Pneumonia, Pulmonary Embolus (PE), Thyroid Disorder


Additional Past Medical History / Comment(s): ca-thyroid, PE & DVT 2015,  hx 

colon polyps, not sure what clotting disorder is called


History of Any Multi-Drug Resistant Organisms: None Reported


Past Surgical History: Breast Surgery, Hysterectomy


Additional Past Surgical History / Comment(s): thyroidectomy, spleenectomy, 3 

lumps removed in breast. large lymph node removed from under the left arm, 

COLONOSCOPY


Past Anesthesia/Blood Transfusion Reactions: No Reported Reaction


Smoking Status: Never smoker





- Past Family History


  ** mother


Family Medical History: Cancer


Additional Family Medical History / Comment(s): colon





  ** brother


Family Medical History: Cancer


Additional Family Medical History / Comment(s): liver, kidneys, pancreas, clots





  ** sister


Family Medical History: Cancer


Additional Family Medical History / Comment(s): breast ca, clots





Surgical - Exam


                                   Vital Signs











Temp Pulse BP


 


 97.7 F   114 H  136/85 


 


 10/10/19 11:46  10/10/19 11:46  10/10/19 11:46














Bariatric Checklist


Checklist: 


Plan: 





Checklist: 





EGD: 


1. Hiatal hernia: 


2. H. Pylori: 





HgbA1c: 





Vitamin D: 





Smoking: Never smoker





Primary care physician referral: [facility]





Psychiatry clearance: 





Cardiology clearance: 





Sleep study: 





Diet journal: 





VTE risk score: 





VTE risk level: 





Rehab needs at discharge:

## 2019-10-11 LAB — ZINC SERPL-MCNC: 77 UG/DL (ref 60–130)

## 2019-12-08 NOTE — P.GSHP
History of Present Illness


H&P Date: 12/09/19

















CHIEF COMPLAINT: GERD





HISTORY OF PRESENT ILLNESS: The patient is a 59-year-old female who


presents reports gastroesophageal reflux disease.  Upper endoscopy was offered 

for further evaluation and management.





PAST MEDICAL HISTORY: 


Please see list.





PAST SURGICAL HISTORY: 


Please see list.





MEDICATIONS: 


Please see list.





ALLERGIES:  Please see list. 





SOCIAL HISTORY: No illicit drug use





FAMILY HISTORY: No reports of Crohn disease or ulcerative colitis. 





REVIEW OF ORGAN SYSTEMS: 


CONSTITUTIONAL: No reports of fevers or chills. 


GI:  Denies any blood in stools or constipation. 





PHYSICAL EXAM: 


VITAL SIGNS:  Stable


GENERAL: Well-developed and pleasant in no acute distress. 


HEENT: No scleral icterus. Extraocular movements grossly


intact. Moist buccal mucosa. 


NECK: Supple without lymphadenopathy. 


CHEST: Unlabored respirations. Equal bilateral excursions. 


CARDIOVASCULAR: Regular rate and rhythm. Distal 2+ pulses. 


ABDOMEN: Soft, nondistended.  


MUSCULOSKELETAL: No clubbing, cyanosis, or edema. 





ASSESSMENT: 


1.  Gastroesophageal reflux disease





PLAN: 


1. Recommend proceeding with an upper endoscopy





Past Medical History


Past Medical History: Asthma, Blood Disorder, Cancer, Chest Pain / Angina, 

Diabetes Mellitus, Deep Vein Thrombosis (DVT), GERD/Reflux, Hypertension, 

Pneumonia, Pulmonary Embolus (PE), Thyroid Disorder


Additional Past Medical History / Comment(s): ca-thyroid, PE & DVT 2015,  hx 

colon polyps, not sure what clotting disorder is called


History of Any Multi-Drug Resistant Organisms: None Reported


Past Surgical History: Breast Surgery, Hysterectomy


Additional Past Surgical History / Comment(s): thyroidectomy, spleenectomy, 3 

lumps removed in breast. large lymph node removed from under the left arm, 

COLONOSCOPY


Past Anesthesia/Blood Transfusion Reactions: No Reported Reaction


Smoking Status: Never smoker





- Past Family History


  ** mother


Family Medical History: Cancer


Additional Family Medical History / Comment(s): colon





  ** brother


Family Medical History: Cancer


Additional Family Medical History / Comment(s): liver, kidneys, pancreas, clots





  ** sister


Family Medical History: Cancer, Pulmonary Embolus


Additional Family Medical History / Comment(s): breast ca, clots





Medications and Allergies


                                Home Medications











 Medication  Instructions  Recorded  Confirmed  Type


 


Furosemide [Lasix] 20 mg PO QAM 02/05/15 12/05/19 History


 


Potassium Chloride [Klor-Con 10] 10 meq PO HS 02/05/15 12/05/19 History


 


metFORMIN HCL 1,000 mg PO AC-BID 05/01/17 12/05/19 History


 


rOPINIRole HCL [Requip] 4 mg PO HS 05/03/17 12/05/19 History


 


Levothyroxine Sodium [Synthroid] 150 mcg PO MOTUWETHFRSA 04/13/18 12/05/19 

History


 


Rivaroxaban [Xarelto] 20 mg PO HS 04/13/18 12/05/19 History


 


traZODone HCL [Desyrel] 150 mg PO HS 04/13/18 12/05/19 History


 


Atorvastatin [Lipitor] 10 mg PO HS 03/27/19 12/05/19 History


 


Omeprazole 20 mg PO HS 03/27/19 12/05/19 History


 


Pioglitazone [Actos] 30 mg PO DAILY 03/27/19 12/05/19 History


 


traMADol HCL [Ultram] 50 mg PO HS 03/27/19 12/05/19 History


 


Cetirizine HCl 10 mg PO DAILY 10/21/19 12/05/19 History








                                    Allergies











Allergy/AdvReac Type Severity Reaction Status Date / Time


 


latex Allergy  Rash/Hives Verified 12/05/19 11:56


 


sitagliptin [From Januvia] Allergy  Rash/Hives Verified 12/05/19 11:56

## 2019-12-09 ENCOUNTER — HOSPITAL ENCOUNTER (OUTPATIENT)
Dept: HOSPITAL 47 - ORWHC2ENDO | Age: 59
Discharge: HOME | End: 2019-12-09
Payer: MEDICARE

## 2019-12-09 VITALS — RESPIRATION RATE: 18 BRPM | SYSTOLIC BLOOD PRESSURE: 132 MMHG | DIASTOLIC BLOOD PRESSURE: 90 MMHG | HEART RATE: 109 BPM

## 2019-12-09 VITALS — TEMPERATURE: 98.4 F

## 2019-12-09 VITALS — BODY MASS INDEX: 47.9 KG/M2

## 2019-12-09 DIAGNOSIS — E11.9: ICD-10-CM

## 2019-12-09 DIAGNOSIS — K29.50: Primary | ICD-10-CM

## 2019-12-09 DIAGNOSIS — K21.0: ICD-10-CM

## 2019-12-09 DIAGNOSIS — Z79.891: ICD-10-CM

## 2019-12-09 DIAGNOSIS — Z86.010: ICD-10-CM

## 2019-12-09 DIAGNOSIS — Z80.51: ICD-10-CM

## 2019-12-09 DIAGNOSIS — Z88.8: ICD-10-CM

## 2019-12-09 DIAGNOSIS — E78.5: ICD-10-CM

## 2019-12-09 DIAGNOSIS — E89.0: ICD-10-CM

## 2019-12-09 DIAGNOSIS — Z85.850: ICD-10-CM

## 2019-12-09 DIAGNOSIS — Z86.718: ICD-10-CM

## 2019-12-09 DIAGNOSIS — K44.9: ICD-10-CM

## 2019-12-09 DIAGNOSIS — Z87.01: ICD-10-CM

## 2019-12-09 DIAGNOSIS — J45.909: ICD-10-CM

## 2019-12-09 DIAGNOSIS — Z79.899: ICD-10-CM

## 2019-12-09 DIAGNOSIS — Z79.890: ICD-10-CM

## 2019-12-09 DIAGNOSIS — Z79.51: ICD-10-CM

## 2019-12-09 DIAGNOSIS — Z91.040: ICD-10-CM

## 2019-12-09 DIAGNOSIS — Z79.84: ICD-10-CM

## 2019-12-09 DIAGNOSIS — Z79.01: ICD-10-CM

## 2019-12-09 DIAGNOSIS — K22.10: ICD-10-CM

## 2019-12-09 DIAGNOSIS — Z90.710: ICD-10-CM

## 2019-12-09 DIAGNOSIS — Z82.49: ICD-10-CM

## 2019-12-09 DIAGNOSIS — I10: ICD-10-CM

## 2019-12-09 DIAGNOSIS — Z80.0: ICD-10-CM

## 2019-12-09 DIAGNOSIS — Z86.711: ICD-10-CM

## 2019-12-09 DIAGNOSIS — E66.01: ICD-10-CM

## 2019-12-09 DIAGNOSIS — Z80.3: ICD-10-CM

## 2019-12-09 LAB
GLUCOSE BLD-MCNC: 146 MG/DL (ref 75–99)
GLUCOSE BLD-MCNC: 150 MG/DL (ref 75–99)

## 2019-12-09 PROCEDURE — 88305 TISSUE EXAM BY PATHOLOGIST: CPT

## 2019-12-09 PROCEDURE — 43239 EGD BIOPSY SINGLE/MULTIPLE: CPT

## 2019-12-09 NOTE — P.PCN
Date of Procedure: 12/09/19


Description of Procedure: 











PREOPERATIVE DIAGNOSIS:


Gastroesophageal reflux disease.


Morbid obesity.





POSTOPERATIVE DIAGNOSIS:


Morbid obesity.


Gastritis.


Gastroesophageal reflux disease.


Diaphragmatic hiatal hernia





OPERATION:


Esophagogastroduodenoscopy with biopsies along antrum.





SURGEON: Jessica Denise MD





ANESTHESIA: MAC.





INDICATIONS:


The patient is a 59-year-old female who presents with a history of reflux 

disease. Benefits and risks of the procedure were described. Informed consent 

was obtained.





DESCRIPTION:


The patient was brought into the endoscopy suite and laid in the left lateral 

decubitus position. An Olympus gastroscope was passed along the posterior 

oropharynx down to the distal esophagus where the squamocolumnar junction was 

encountered at 41 cm from the incisors. The stomach was entered and no bile 

reflux was found.  Additional findings are listed below. Biopsies with cold f

orceps were obtained of the antrum. The first through third portion of the 

duodenum was examined and unremarkable. Retroflexion of the scope confirmed  

Hill grade 3 lower esophageal valve. The squamocolumnar junction demonstrated LA

grade B erosive esophagitis. The stomach was desufflated. The patient tolerated 

the procedure well.





FINDINGS:


Squamocolumnar junction 40 cm from the incisors.


Diaphragmatic hiatus at 41 cm.


Hiatal hernia, 1 cm


Hill grade 3 lower esophageal valve.


LA grade B erosive esophagitis.


No active duodenitis.


Chronic gastritis 





RECOMMENDATIONS:


Upper endoscopy as needed.





Plan - Discharge Summary


Discharge Rx Participant: No


New Discharge Prescriptions: 


No Action


   Furosemide [Lasix] 20 mg PO QAM


   Potassium Chloride [Klor-Con 10] 10 meq PO HS


   metFORMIN HCL 1,000 mg PO AC-BID


   rOPINIRole HCL [Requip] 4 mg PO HS


   traZODone HCL [Desyrel] 150 mg PO HS


   Levothyroxine Sodium [Synthroid] 150 mcg PO MOTUWETHFRSA


   Rivaroxaban [Xarelto] 20 mg PO HS


   traMADol HCL [Ultram] 50 mg PO HS


   Atorvastatin [Lipitor] 10 mg PO HS


   Pioglitazone [Actos] 30 mg PO DAILY


   Omeprazole 20 mg PO HS


   Cetirizine HCl 10 mg PO DAILY


   Fluticasone/Salmeterol [Fluticasone-Salmeterol 232-14] 232


Discharge Medication List





Furosemide [Lasix] 20 mg PO QAM 02/05/15 [History]


Potassium Chloride [Klor-Con 10] 10 meq PO HS 02/05/15 [History]


metFORMIN HCL 1,000 mg PO AC-BID 05/01/17 [History]


rOPINIRole HCL [Requip] 4 mg PO HS 05/03/17 [History]


Levothyroxine Sodium [Synthroid] 150 mcg PO MOTUWETHFRSA 04/13/18 [History]


Rivaroxaban [Xarelto] 20 mg PO HS 04/13/18 [History]


traZODone HCL [Desyrel] 150 mg PO HS 04/13/18 [History]


Atorvastatin [Lipitor] 10 mg PO HS 03/27/19 [History]


Omeprazole 20 mg PO HS 03/27/19 [History]


Pioglitazone [Actos] 30 mg PO DAILY 03/27/19 [History]


traMADol HCL [Ultram] 50 mg PO HS 03/27/19 [History]


Cetirizine HCl 10 mg PO DAILY 10/21/19 [History]


Fluticasone/Salmeterol [Fluticasone-Salmeterol 232-14] 232 12/09/19 [History]








Follow up Appointment(s)/Referral(s): 


[facility] [[facility]STAFF] - 01/08/20


Patient Instructions/Handouts:  Gastritis (DC)


Discharge Disposition: HOME SELF-CARE

## 2019-12-10 NOTE — CONS
CONSULTATION



Mrs. Holbrook is a 57-year-old female with known history of recurrent pulmonary embolism

on chronic anticoagulation, history of diabetes and hypertension, who presented to the

hospital with symptoms of not feeling well.  Recently, she fell and had a nondisplaced

fracture in the right lower extremity with some swelling.  For the last few days, she

has been feeling run down with symptoms of dyspnea and fatigue.  She has been

complaining of chest discomfort that has been going on for a long time.  The discomfort

is almost daily and not activity related.  She denies any associated symptoms.  She has

underwent stress test according to her about 3 years ago that was unremarkable.  She

has no clear PND or orthopnea.  She has some peripheral edema.  She has some

palpitation, but no syncope.



Her coronary risk factors are remarkable for the history of hypertension, diabetes.

She is a nonsmoker.  Her lipid profile is not available to me.



MEDICATION:

At home include Xarelto 20 mg daily, Jardiance 10 mg daily, Desyrel, Requip, metformin

1 g twice a day.  Potassium, omeprazole, levothyroxine, insulin, Lasix 40 mg daily.

Lexapro 20 mg daily.  Tenormin 25 mg daily.  Naprosyn.  Dulera.



REVIEW OF SYSTEMS:

RESPIRATORY system:  She had dyspnea on exertion.  She has prior history of pulmonary

embolism.  GI system:  No recent GI bleed.  No peptic ulcer disease.   system:  No

dysuria or hematuria.  Nervous system:  No history of stroke or seizure.



PHYSICAL EXAMINATION:

She is a 57-year-old female, alert, oriented, in no apparent distress.  Obese.  Blood

pressure 108/50 with a heart in the 80s.

HEAD:  Normocephalic.  Eyes sclerae anicteric.  Neck good upstroke.  No bruit.  No

jugular venous distention.

LUNGS: Clear to auscultation.

HEART:  Regular rate and rhythm S1, S2.  No S3, no S4.  No murmur. No rub.

ABDOMEN:  Soft, obese, nontender.  Positive bowel sounds.  No megaly.  Extremities with

ecchymosis noted on the right lower extremity.  Intact distal pulses.  Mild edema on

the right side.



LAB DATA:

Lab data revealed troponin less than 0.012 for 3 samples.  Her cholesterol is 174.  Her

triglycerides of 512.  BUN and creatinine of 11 and 0.52, hemoglobin of 10.7, white

blood cell 12.3.



CT scan of the chest revealed no evidence of pulmonary embolism.  The EKG revealed

sinus mechanism, low voltage in the limb leads.  Otherwise, no acute changes.



Chest x-ray revealed coarse lung markings.  Duplex scan of lower extremity revealed no

evidence of DVT.



IMPRESSION:

1. Symptoms of chest discomfort have atypical features for ischemic heart disease and

    no evidence to suggest acute coronary syndrome.

2. Symptoms of dyspnea with a prior history of pulmonary embolism, anticoagulated.  No

    evidence of recurrence according to CT angiogram.

3. History of diabetes.

4. Hypertension.



RECOMMENDATION:

From the cardiac standpoint, she is stable.  I will obtain echocardiogram with Doppler

to evaluate left ventricular systolic function.  If there is no further symptoms, I

would expect she should be able to be discharged home and followed as an outpatient.



Thank you for this consult.





ERICK / MICHAELN: [id number] / Job#: [id number]
n/a/Pt was admitted to the hospital due to UTI and weakness.

## 2020-01-22 ENCOUNTER — HOSPITAL ENCOUNTER (INPATIENT)
Dept: HOSPITAL 47 - EC | Age: 60
LOS: 3 days | Discharge: HOME | DRG: 202 | End: 2020-01-25
Attending: INTERNAL MEDICINE | Admitting: INTERNAL MEDICINE
Payer: MEDICARE

## 2020-01-22 VITALS — BODY MASS INDEX: 47.5 KG/M2

## 2020-01-22 DIAGNOSIS — F32.9: ICD-10-CM

## 2020-01-22 DIAGNOSIS — Z91.040: ICD-10-CM

## 2020-01-22 DIAGNOSIS — Z85.850: ICD-10-CM

## 2020-01-22 DIAGNOSIS — R07.89: ICD-10-CM

## 2020-01-22 DIAGNOSIS — Z87.01: ICD-10-CM

## 2020-01-22 DIAGNOSIS — E89.0: ICD-10-CM

## 2020-01-22 DIAGNOSIS — F41.9: ICD-10-CM

## 2020-01-22 DIAGNOSIS — E78.5: ICD-10-CM

## 2020-01-22 DIAGNOSIS — Z90.710: ICD-10-CM

## 2020-01-22 DIAGNOSIS — J96.01: ICD-10-CM

## 2020-01-22 DIAGNOSIS — J84.10: ICD-10-CM

## 2020-01-22 DIAGNOSIS — Z86.711: ICD-10-CM

## 2020-01-22 DIAGNOSIS — I11.0: ICD-10-CM

## 2020-01-22 DIAGNOSIS — Z90.81: ICD-10-CM

## 2020-01-22 DIAGNOSIS — E66.01: ICD-10-CM

## 2020-01-22 DIAGNOSIS — I07.1: ICD-10-CM

## 2020-01-22 DIAGNOSIS — Z87.19: ICD-10-CM

## 2020-01-22 DIAGNOSIS — Z79.899: ICD-10-CM

## 2020-01-22 DIAGNOSIS — J45.21: Primary | ICD-10-CM

## 2020-01-22 DIAGNOSIS — J20.9: ICD-10-CM

## 2020-01-22 DIAGNOSIS — Z86.718: ICD-10-CM

## 2020-01-22 DIAGNOSIS — Z80.51: ICD-10-CM

## 2020-01-22 DIAGNOSIS — Z88.8: ICD-10-CM

## 2020-01-22 DIAGNOSIS — Z79.01: ICD-10-CM

## 2020-01-22 DIAGNOSIS — K21.9: ICD-10-CM

## 2020-01-22 DIAGNOSIS — J02.0: ICD-10-CM

## 2020-01-22 DIAGNOSIS — D68.9: ICD-10-CM

## 2020-01-22 DIAGNOSIS — Z80.0: ICD-10-CM

## 2020-01-22 DIAGNOSIS — E11.9: ICD-10-CM

## 2020-01-22 DIAGNOSIS — Z80.3: ICD-10-CM

## 2020-01-22 DIAGNOSIS — Z79.890: ICD-10-CM

## 2020-01-22 DIAGNOSIS — R19.7: ICD-10-CM

## 2020-01-22 DIAGNOSIS — Z79.84: ICD-10-CM

## 2020-01-22 LAB
ALBUMIN SERPL-MCNC: 4 G/DL (ref 3.5–5)
ALP SERPL-CCNC: 190 U/L (ref 38–126)
ALT SERPL-CCNC: 33 U/L (ref 4–34)
ANION GAP SERPL CALC-SCNC: 6 MMOL/L
APTT BLD: 25.3 SEC (ref 22–30)
AST SERPL-CCNC: 41 U/L (ref 14–36)
BASOPHILS # BLD AUTO: 0.2 K/UL (ref 0–0.2)
BASOPHILS NFR BLD AUTO: 2 %
BUN SERPL-SCNC: 8 MG/DL (ref 7–17)
CALCIUM SPEC-MCNC: 9.6 MG/DL (ref 8.4–10.2)
CHLORIDE SERPL-SCNC: 104 MMOL/L (ref 98–107)
CO2 SERPL-SCNC: 30 MMOL/L (ref 22–30)
D DIMER PPP FEU-MCNC: 0.26 MG/L FEU (ref ?–0.6)
EOSINOPHIL # BLD AUTO: 0.5 K/UL (ref 0–0.7)
EOSINOPHIL NFR BLD AUTO: 4 %
ERYTHROCYTE [DISTWIDTH] IN BLOOD BY AUTOMATED COUNT: 4.3 M/UL (ref 3.8–5.4)
ERYTHROCYTE [DISTWIDTH] IN BLOOD: 18 % (ref 11.5–15.5)
GLUCOSE BLD-MCNC: 167 MG/DL (ref 75–99)
GLUCOSE BLD-MCNC: 389 MG/DL (ref 75–99)
GLUCOSE SERPL-MCNC: 152 MG/DL (ref 74–99)
HCT VFR BLD AUTO: 43.9 % (ref 34–46)
HGB BLD-MCNC: 14.6 GM/DL (ref 11.4–16)
INR PPP: 1 (ref ?–1.2)
LYMPHOCYTES # SPEC AUTO: 2.9 K/UL (ref 1–4.8)
LYMPHOCYTES NFR SPEC AUTO: 25 %
MCH RBC QN AUTO: 33.8 PG (ref 25–35)
MCHC RBC AUTO-ENTMCNC: 33.2 G/DL (ref 31–37)
MCV RBC AUTO: 102 FL (ref 80–100)
MONOCYTES # BLD AUTO: 0.6 K/UL (ref 0–1)
MONOCYTES NFR BLD AUTO: 5 %
NEUTROPHILS # BLD AUTO: 7.2 K/UL (ref 1.3–7.7)
NEUTROPHILS NFR BLD AUTO: 62 %
PLATELET # BLD AUTO: 389 K/UL (ref 150–450)
POTASSIUM SERPL-SCNC: 4.3 MMOL/L (ref 3.5–5.1)
PROT SERPL-MCNC: 7.8 G/DL (ref 6.3–8.2)
PT BLD: 10.5 SEC (ref 9–12)
SODIUM SERPL-SCNC: 140 MMOL/L (ref 137–145)
WBC # BLD AUTO: 11.7 K/UL (ref 3.8–10.6)

## 2020-01-22 PROCEDURE — 80048 BASIC METABOLIC PNL TOTAL CA: CPT

## 2020-01-22 PROCEDURE — 83036 HEMOGLOBIN GLYCOSYLATED A1C: CPT

## 2020-01-22 PROCEDURE — 99285 EMERGENCY DEPT VISIT HI MDM: CPT

## 2020-01-22 PROCEDURE — 71275 CT ANGIOGRAPHY CHEST: CPT

## 2020-01-22 PROCEDURE — 87502 INFLUENZA DNA AMP PROBE: CPT

## 2020-01-22 PROCEDURE — 93005 ELECTROCARDIOGRAM TRACING: CPT

## 2020-01-22 PROCEDURE — 94760 N-INVAS EAR/PLS OXIMETRY 1: CPT

## 2020-01-22 PROCEDURE — 71046 X-RAY EXAM CHEST 2 VIEWS: CPT

## 2020-01-22 PROCEDURE — 87040 BLOOD CULTURE FOR BACTERIA: CPT

## 2020-01-22 PROCEDURE — 93306 TTE W/DOPPLER COMPLETE: CPT

## 2020-01-22 PROCEDURE — 94640 AIRWAY INHALATION TREATMENT: CPT

## 2020-01-22 PROCEDURE — 96365 THER/PROPH/DIAG IV INF INIT: CPT

## 2020-01-22 PROCEDURE — 96361 HYDRATE IV INFUSION ADD-ON: CPT

## 2020-01-22 PROCEDURE — 83880 ASSAY OF NATRIURETIC PEPTIDE: CPT

## 2020-01-22 PROCEDURE — 85610 PROTHROMBIN TIME: CPT

## 2020-01-22 PROCEDURE — 85025 COMPLETE CBC W/AUTO DIFF WBC: CPT

## 2020-01-22 PROCEDURE — 80053 COMPREHEN METABOLIC PANEL: CPT

## 2020-01-22 PROCEDURE — 36415 COLL VENOUS BLD VENIPUNCTURE: CPT

## 2020-01-22 PROCEDURE — 84145 PROCALCITONIN (PCT): CPT

## 2020-01-22 PROCEDURE — 85379 FIBRIN DEGRADATION QUANT: CPT

## 2020-01-22 PROCEDURE — 83605 ASSAY OF LACTIC ACID: CPT

## 2020-01-22 PROCEDURE — 96367 TX/PROPH/DG ADDL SEQ IV INF: CPT

## 2020-01-22 PROCEDURE — 84443 ASSAY THYROID STIM HORMONE: CPT

## 2020-01-22 PROCEDURE — 96375 TX/PRO/DX INJ NEW DRUG ADDON: CPT

## 2020-01-22 PROCEDURE — 84484 ASSAY OF TROPONIN QUANT: CPT

## 2020-01-22 PROCEDURE — 85730 THROMBOPLASTIN TIME PARTIAL: CPT

## 2020-01-22 RX ADMIN — ATORVASTATIN CALCIUM SCH MG: 10 TABLET, FILM COATED ORAL at 19:56

## 2020-01-22 RX ADMIN — FUROSEMIDE SCH: 10 INJECTION, SOLUTION INTRAMUSCULAR; INTRAVENOUS at 19:51

## 2020-01-22 RX ADMIN — METHYLPREDNISOLONE SODIUM SUCCINATE SCH MG: 40 INJECTION, POWDER, FOR SOLUTION INTRAMUSCULAR; INTRAVENOUS at 22:53

## 2020-01-22 RX ADMIN — RIVAROXABAN SCH MG: 20 TABLET, FILM COATED ORAL at 19:56

## 2020-01-22 RX ADMIN — IPRATROPIUM BROMIDE AND ALBUTEROL SULFATE PRN ML: .5; 3 SOLUTION RESPIRATORY (INHALATION) at 20:10

## 2020-01-22 RX ADMIN — PANTOPRAZOLE SODIUM SCH MG: 40 TABLET, DELAYED RELEASE ORAL at 19:56

## 2020-01-22 RX ADMIN — FUROSEMIDE SCH MG: 10 INJECTION, SOLUTION INTRAMUSCULAR; INTRAVENOUS at 17:31

## 2020-01-22 RX ADMIN — POTASSIUM CHLORIDE SCH MEQ: 750 TABLET, EXTENDED RELEASE ORAL at 19:56

## 2020-01-22 RX ADMIN — ROPINIROLE SCH MG: 4 TABLET, FILM COATED ORAL at 19:56

## 2020-01-22 RX ADMIN — INSULIN ASPART SCH UNIT: 100 INJECTION, SOLUTION INTRAVENOUS; SUBCUTANEOUS at 21:31

## 2020-01-22 NOTE — CT
EXAMINATION TYPE: CT chest angio for PE

 

DATE OF EXAM: 1/22/2020

 

COMPARISON: CT chest April 13, 2013. Chest x-ray earlier today.

 

HISTORY: Shortness of breath

 

CT DLP: 1112.3 mGycm. Automated Exposure Control for Dose Reduction was Utilized.

 

 

CONTRAST: 

CTA scan of the thorax is performed with IV Contrast, patient injected with 100 mL of Isovue 370, pul
monary embolism protocol.  MIP Images are created on CT scanner and reviewed.

 

FINDINGS: There is respiratory motion artifact degradation limiting evaluation for subcentimeter nodu
les. In addition exam is suboptimal due to patient's large body habitus. 

 

LUNGS: No pleural effusion or pneumothorax is noted bilaterally. Confirmation mild central vascular c
ongestion without suspicious focal consolidation. No new pulmonary masses.

 

 MEDIASTINUM: There is suboptimal bolus with heterogeneity and near equal contrast in right and left 
heart systems, no central pulmonary embolism is identified. Smaller segmental and subsegmental PE can
not be entirely excluded on this exam. There are no new greater than 1 cm hilar or mediastinal lymph 
nodes.   No new pericardial effusion is seen. Cardiomegaly is redemonstrated.

 

OTHER: Persistent diffuse fatty infiltration of liver. Persistent anterior left upper quadrant lesion
 measuring 2.8 cm current study image 150 for prophylactic residual splenule when comparing with wilda
ral older CTs back to 2010. Multilevel spurring in the spine. Spine is straightened on sagittal image
s. Redemonstration of prominent bilateral axillary lymph nodes including 2 lymph nodes slightly enlar
ged on the left reference axial image 43 versus prior study image 42. Thyroid gland is small in size 
or atrophic.

 

IMPRESSION: Suboptimal without central pulmonary embolism, smaller segmental and subsegmental PE is n
ot entirely excluded. Suspect CHF exacerbation is favored on x-ray with cardiomegaly and mild central
 vascular congestion. No focal infiltrate or pleural effusion.

## 2020-01-22 NOTE — XR
EXAMINATION TYPE: XR chest 2V

 

DATE OF EXAM: 1/22/2020

 

COMPARISON: Chest x-ray and CTA chest April 13, 2018

 

HISTORY: Difficulty in breathing and cough for one week.

 

TECHNIQUE:  Frontal and lateral views of the chest are obtained.

 

FINDINGS: Exam suboptimal due to large body habitus. There is no suspicious new focal air space opaci
ty, pleural effusion, or pneumothorax seen.  The cardiac silhouette remains enlarged with central vas
cular congestion thought present.   The osseous structures are intact.

 

IMPRESSION:  Correlate for CHF exacerbation as there is cardiomegaly with thought mild central vascul
ar congestion present.

## 2020-01-22 NOTE — P.HPIM
History of Present Illness


H&P Date: 01/22/20


Chief Complaint: Shortness of breath





Patient is a 59-year-old female with a known history of asthma, diabetes type 2,

history of DVT/PE currently on xarelto, GERD, hypertension and hypothyroidism 

was sent from Dr. العراقي's office with the complaints of dyspnea, tachycardia a

nd hypoxia.  Patient has been having upper respiratory symptoms for the past 1 

week and patient developed heaviness in the chest with cough and minimal sputum 

production.  Denied any chest pain.  Patient has been having upper respiratory 

symptoms along with sore throat.  Patient had positive stress throat at PCPs 

office.  Patient was given antibiotics but patient was not able to  her 

medications by the time she come to the hospital.  Patient also noted to have 

increased leg swelling.  Denied any nausea vomiting.  No abdominal pain.  

Patient has been having diarrhea for 3 days now.  Diarrhea did improve 

otherwise.


Patient denied any fever but does have subjective chills.


Patient states that she had cardiac workup done in July 2019 including cardiac 

catheterization which was nonobstructive and no history of stent placement.


Chest x-ray showed correlate for CHF exacerbation as there is cardiomegaly and 

mild vascular congestion.


BNP less than 11 otherwise.  Patient was started on IV diuresis with Lasix 20 mg

every 12.


Patient was given a dose of Zosyn and azithromycin in the ER.


CT angiogram showed suboptimal study.  No evidence of pulmonary embolism.  No 

focal infiltrate was noted.  Vascular congestion.


Troponin 1 negative


WBC 11.7


Lactic acid 2.4


D-dimer not elevated.  Influenza negative.





Review of Systems





Constitutional: Patient denies any fever or chills .  Patient does have 

generalized weakness.  No weight loss.  


Abdomen: Patient denied nausea vomiting and diarrhea and abdominal pain.


Cardiovascular: Patient denies any chest pain or short of breath no 

palpitations.  Leg swelling.  Chest tightness.


Respiratory: Cough without sputum production and shortness of breath


Neurologic: Patient denied any numbness or tingling headache.


Musculoskeletal: Patient denies any complaints of joint swelling or deformity.


Skin: Negative


Psychiatric: Negative


Endocrine: No heat or cold intolerance.  No recent weight gain.


Genitourinary: No dysuria or hematuria.


All other 14 point ROS negative except the above





Past Medical History


Past Medical History: Asthma, Blood Disorder, Cancer, Chest Pain / Angina, 

Diabetes Mellitus, Deep Vein Thrombosis (DVT), GERD/Reflux, Hypertension, 

Pneumonia, Pulmonary Embolus (PE), Thyroid Disorder


Additional Past Medical History / Comment(s): ca-thyroid, PE & DVT 2015,  hx 

colon polyps, not sure what clotting disorder is called


History of Any Multi-Drug Resistant Organisms: None Reported


Past Surgical History: Breast Surgery, Hysterectomy


Additional Past Surgical History / Comment(s): thyroidectomy, spleenectomy, 3 

lumps removed in breast. large lymph node removed from under the left arm, 

COLONOSCOPY, bladder resection


Past Anesthesia/Blood Transfusion Reactions: No Reported Reaction


Past Psychological History: Anxiety, Depression


Smoking Status: Never smoker


Past Alcohol Use History: None Reported


Past Drug Use History: None Reported





- Past Family History


  ** mother


Family Medical History: Cancer


Additional Family Medical History / Comment(s): colon





  ** brother


Family Medical History: Cancer


Additional Family Medical History / Comment(s): liver, kidneys, pancreas, clots





  ** sister


Family Medical History: Cancer


Additional Family Medical History / Comment(s): breast ca, clots





Medications and Allergies


                                Home Medications











 Medication  Instructions  Recorded  Confirmed  Type


 


Furosemide [Lasix] 40 mg PO QAM 02/05/15 01/22/20 History


 


Potassium Chloride [Klor-Con 10] 10 meq PO HS 02/05/15 01/22/20 History


 


metFORMIN HCL 1,000 mg PO AC-BID 05/01/17 01/22/20 History


 


rOPINIRole HCL [Requip] 4 mg PO HS 05/03/17 01/22/20 History


 


Levothyroxine Sodium [Synthroid] 150 mcg PO [id number] 04/13/18 01/22/20 

History


 


Rivaroxaban [Xarelto] 20 mg PO HS 04/13/18 01/22/20 History


 


traZODone HCL [Desyrel] 150 mg PO HS 04/13/18 01/22/20 History


 


Atorvastatin [Lipitor] 10 mg PO HS 03/27/19 01/22/20 History


 


Omeprazole 20 mg PO HS 03/27/19 01/22/20 History


 


Pioglitazone [Actos] 30 mg PO DAILY 03/27/19 01/22/20 History


 


traMADol HCL [Ultram] 50 mg PO HS 03/27/19 01/22/20 History


 


Cetirizine HCl 10 mg PO HS 10/21/19 01/22/20 History


 


Levothyroxine Sodium [Synthroid] 225 mcg PO HAAS 01/22/20 01/22/20 History








                                    Allergies











Allergy/AdvReac Type Severity Reaction Status Date / Time


 


latex Allergy  Rash/Hives Verified 01/22/20 12:23


 


sitagliptin [From Januvia] Allergy  Rash/Hives Verified 01/22/20 12:23














Physical Exam


Vitals: 


                                   Vital Signs











  Temp Pulse Resp BP Pulse Ox


 


 01/22/20 14:37   90  18  150/83  95


 


 01/22/20 13:58   96   


 


 01/22/20 13:49   98   


 


 01/22/20 13:30   92  20  136/86  96


 


 01/22/20 13:00   101 H   149/80  93 L


 


 01/22/20 12:00   96   134/77  97


 


 01/22/20 11:30     135/88  96


 


 01/22/20 11:00   95   149/92  98


 


 01/22/20 10:56      94 L


 


 01/22/20 10:35  98.0 F  97  18  128/82  95








                                Intake and Output











 01/22/20 01/22/20 01/22/20





 06:59 14:59 22:59


 


Other:   


 


  Weight  122.924 kg 122.9 kg














PHYSICAL EXAMINATION: 


Patient is lying in the bed comfortably, no acute distress, awake alert and 

oriented.. 


HEENT: Normocephalic. Neck is supple. Pupils reactive. Nostrils clear. Oral 

cavity is moist. Ears reveal no drainage. 


Neck reveals no JVD, carotid bruits, or thyromegaly. 


CHEST EXAMINATION: Trachea is central. Symmetrical expansion.  Bibasilar 

diminished sounds and scattered rhonchi and expiratory wheeze. 


CARDIAC: Normal S1, S2 with no gallops. No murmurs 


ABDOMEN: Soft. Bowel sounds normal. No organomegaly. No abdominal bruits. 


Extremities: Trace bilateral edema.  No clubbing or cyanosis


Neurologically awake, alert, oriented x3 with well-coordinated movements.  No 

focal deficits noted


Skin: No rash or skin lesions. 


Psychiatric: Coperative.  Nonsuicidal


Musculoskeletal: No joint swelling or deformity.  Normal range of motion.








Results


CBC & Chem 7: 


                                 01/22/20 11:01





                                 01/22/20 11:01


Labs: 


                  Abnormal Lab Results - Last 24 Hours (Table)











  01/22/20 01/22/20 01/22/20 Range/Units





  11:01 11:01 11:01 


 


WBC   11.7 H   (3.8-10.6)  k/uL


 


MCV   102.0 H   (80.0-100.0)  fL


 


RDW   18.0 H   (11.5-15.5)  %


 


Creatinine    0.41 L  (0.52-1.04)  mg/dL


 


Glucose    152 H  (74-99)  mg/dL


 


Plasma Lactic Acid Vasu  2.4 H*    (0.7-2.0)  mmol/L


 


AST    41 H  (14-36)  U/L


 


Alkaline Phosphatase    190 H  ()  U/L














Thrombosis Risk Factor Assmnt





- DVT/VTE Prophylaxis


DVT/VTE Prophylaxis: Pharmacologic Prophylaxis ordered





- Choose All That Apply


Each Factor Represents 1 point: Age 41-60 years


Each Risk Factor Represents 3 Points: History of DVT/PE


Thrombosis Risk Factor Assessment Total Risk Factor Score: 4


Thrombosis Risk Factor Assessment Level: Moderate Risk





Assessment and Plan


Assessment: 





Acute hypoxic respiratory failure secondary to asthma exacerbation which is due 

to URI


Pulmonary vascular congestion.  Unlikely CHF with a BNP less than 11


Positive strep throat


History of DVT/PE currently on xarelto


Hypertension


Diabetes type 2 non-insulin-dependent


Hypothyroidism


Anxiety/depression


History of colon polyps


DVT prophylaxis patient is already on full anticoagulation





Plan:


Patient will be continued on DuoNeb's and started on Solu-Medrol 40 mg every 8. 

Continue with antibiotics the form of ceftriaxone and azithromycin.  Continue 

with oxygen therapy.  Continue with IV diuresis for next 24 hours.  Patient does

take Lasix at home due to chronic lower extremity swelling.  Cardiology and 

pulmonary was consulted.


2-D echocardiogram was ordered.  Further recommendations based on the clinical 

course.


Time with Patient: Greater than 30

## 2020-01-22 NOTE — ED
SOB HPI





- General


Chief Complaint: Shortness of Breath


Stated Complaint: SOB, URI


Time Seen by Provider: 01/22/20 10:43


Source: patient


Mode of arrival: wheelchair


Limitations: no limitations





- History of Present Illness


Initial Comments: 


59-year-old female with history of thyroidectomy, splenectomy, thyroid cancer, 

pulmonary embolism, DVT on xarelto currently states she is complaint--presents 

emergency department today for chief complaint of cough congestion, patient 

positive strep test and SOB x 1 week.  She states she has had upper respiratory 

symptoms along with sore throat and shortness of breath for one week.  Patient 

denies any chest pain she states she does feel as though her legs have been more

swollen than usual.  Patient denies any vomiting diarrhea or abdominal pain. 

Patient has cardiac catheterization performed July 2019 which revealed no build 

up within vessels. Patient denies recording fevers, but admits to chills. 

Patient saw PCP today where she was strep pharyngitis however due to patient's 

complaints of shortness of breath and past medical history she was sent to the 

emergency department for further evaluation. Upon arrival patient does not 

appear acutely SOB, no tachypnea. Patient VS within acceptable limits. Patient 

afebrile.








- Related Data


                                Home Medications











 Medication  Instructions  Recorded  Confirmed


 


Furosemide [Lasix] 40 mg PO QAM 02/05/15 01/22/20


 


Potassium Chloride [Klor-Con 10] 10 meq PO HS 02/05/15 01/22/20


 


metFORMIN HCL 1,000 mg PO AC-BID 05/01/17 01/22/20


 


rOPINIRole HCL [Requip] 4 mg PO HS 05/03/17 01/22/20


 


Levothyroxine Sodium [Synthroid] 150 mcg PO MOTUWETHFRSA 04/13/18 01/22/20


 


Rivaroxaban [Xarelto] 20 mg PO HS 04/13/18 01/22/20


 


traZODone HCL [Desyrel] 150 mg PO HS 04/13/18 01/22/20


 


Atorvastatin [Lipitor] 10 mg PO HS 03/27/19 01/22/20


 


Omeprazole 20 mg PO HS 03/27/19 01/22/20


 


Pioglitazone [Actos] 30 mg PO DAILY 03/27/19 01/22/20


 


traMADol HCL [Ultram] 50 mg PO HS 03/27/19 01/22/20


 


Cetirizine HCl 10 mg PO HS 10/21/19 01/22/20


 


Levothyroxine Sodium [Synthroid] 225 mcg PO HAAS 01/22/20 01/22/20











                                    Allergies











Allergy/AdvReac Type Severity Reaction Status Date / Time


 


latex Allergy  Rash/Hives Verified 01/22/20 12:23


 


sitagliptin [From Januvia] Allergy  Rash/Hives Verified 01/22/20 12:23














Review of Systems


ROS Statement: 


Those systems with pertinent positive or pertinent negative responses have been 

documented in the HPI.





ROS Other: All systems not noted in ROS Statement are negative.





Past Medical History


Past Medical History: Asthma, Blood Disorder, Cancer, Chest Pain / Angina, 

Diabetes Mellitus, Deep Vein Thrombosis (DVT), GERD/Reflux, Hypertension, 

Pneumonia, Pulmonary Embolus (PE), Thyroid Disorder


Additional Past Medical History / Comment(s): ca-thyroid, PE & DVT 2015,  hx 

colon polyps, not sure what clotting disorder is called


History of Any Multi-Drug Resistant Organisms: None Reported


Past Surgical History: Breast Surgery, Hysterectomy


Additional Past Surgical History / Comment(s): thyroidectomy, spleenectomy, 3 

lumps removed in breast. large lymph node removed from under the left arm, 

COLONOSCOPY


Past Anesthesia/Blood Transfusion Reactions: No Reported Reaction


Past Psychological History: Anxiety, Depression


Smoking Status: Never smoker


Past Alcohol Use History: None Reported


Past Drug Use History: None Reported





- Past Family History


  ** mother


Family Medical History: Cancer


Additional Family Medical History / Comment(s): colon





  ** brother


Family Medical History: Cancer


Additional Family Medical History / Comment(s): liver, kidneys, pancreas, clots





  ** sister


Family Medical History: Cancer


Additional Family Medical History / Comment(s): breast ca, clots





General Exam





- General Exam Comments


Initial Comments: 


General:  The patient is awake and alert, in no distress


Eye:  +3 mm pupils are equal, round and reactive to light, extra-ocular 

movements are intact.  No nystagmus.  There is normal conjunctiva bilaterally.  

No signs of icterus.  No photophobia


Ears, nose, mouth and throat:  There are moist mucous membranes and no oral 

lesions.  Oropharynx was mildly erythematous there is no tonsillar enlargement 

exudates or lesions.  Uvula midline.  Tympanic membranes are not erythematous or

is no effusions bulging or retraction.  No tenderness to palpation of the 

mastoid.  No anterior cervical lymphadenopathy.  Rhinorrhea, clear and bilateral

nares.  No tripoding, no drooling.


Neck:  The neck is supple, there is no tenderness or JVD.  No nuchal rigidity 


Cardiovascular:  There is a regular rate and rhythm. No murmur, rub or gallop is

appreciated.


Respiratory:  Lungs are clear to auscultation, respirations are non-labored, 

breath sounds are equal.  No wheezes, stridor, rales, or rhonchi.  No 

retractions or abdominal breathing. Dry cough


Gastrointestinal:  Soft, non-distended, non-tender abdomen without masses or 

organomegaly noted. There is no rebound or guarding present.  Bowel sounds are 

unremarkable.


Musculoskeletal:  Normal ROM, no tenderness.  Strength 5/5. Sensation intact. 

Radial pulses equal bilaterally 2+.  


Neurological:  A&O x 3. CN II-XII intact grossly, There are no obvious motor or 

sensory deficits. Coordination appears grossly intact. Speech appears normal, no

muffling.


Skin:  Skin is warm and dry and no rashes or lesions are noted.  Mild edema of 

LE b/l non pitting.


Psychiatric:  Cooperative





Limitations: no limitations





Course


                                   Vital Signs











  01/22/20





  10:35


 


Temperature 98.0 F


 


Pulse Rate 97


 


Respiratory 18





Rate 


 


Blood Pressure 128/82


 


O2 Sat by Pulse 95





Oximetry 














Medical Decision Making





- Medical Decision Making


59-year-old female history of pulmonary embolism,pulmonary fibrosis, asplenia. 

History of URI symptoms.Mild leukocytosis.  Lactic acid mildly elevated, patient

provided fluids prior to BNP results. CXR consistent wtih central congestion, 

lasix initiated. However later BNP returned WNL. Patient CTA (-) for large PE, 

however redemonstrated findings consistent with heart failure, however could not

r/o a superimposed pneumonia. Patient initiated on zosyn, broad spectrum ABX 

upon arrival in ER, as well as azithromycin for atypical coverage. Patient VS 

remain stable. Given patient PMH will admit for cardiology evaluation and IV 

antibiotics. Patient is agreeable to this care plan and I discussed case and PMH

in detail with Dr. Dubon who is agreeable to admission. Accepted by 

admitting provider. 








- Lab Data


Result diagrams: 


                                 01/22/20 11:01





                                 01/22/20 11:01


                                   Lab Results











  01/22/20 01/22/20 01/22/20 Range/Units





  11:01 11:01 11:01 


 


WBC   11.7 H   (3.8-10.6)  k/uL


 


RBC   4.30   (3.80-5.40)  m/uL


 


Hgb   14.6   (11.4-16.0)  gm/dL


 


Hct   43.9   (34.0-46.0)  %


 


MCV   102.0 H   (80.0-100.0)  fL


 


MCH   33.8   (25.0-35.0)  pg


 


MCHC   33.2   (31.0-37.0)  g/dL


 


RDW   18.0 H   (11.5-15.5)  %


 


Plt Count   389   (150-450)  k/uL


 


Neutrophils %   62   %


 


Lymphocytes %   25   %


 


Monocytes %   5   %


 


Eosinophils %   4   %


 


Basophils %   2   %


 


Neutrophils #   7.2   (1.3-7.7)  k/uL


 


Lymphocytes #   2.9   (1.0-4.8)  k/uL


 


Monocytes #   0.6   (0-1.0)  k/uL


 


Eosinophils #   0.5   (0-0.7)  k/uL


 


Basophils #   0.2   (0-0.2)  k/uL


 


Anisocytosis   Slight   


 


Macrocytosis   Moderate   


 


PT     (9.0-12.0)  sec


 


INR     (<1.2)  


 


APTT     (22.0-30.0)  sec


 


D-Dimer     (<0.60)  mg/L FEU


 


Sodium    140  (137-145)  mmol/L


 


Potassium    4.3  (3.5-5.1)  mmol/L


 


Chloride    104  ()  mmol/L


 


Carbon Dioxide    30  (22-30)  mmol/L


 


Anion Gap    6  mmol/L


 


BUN    8  (7-17)  mg/dL


 


Creatinine    0.41 L  (0.52-1.04)  mg/dL


 


Est GFR (CKD-EPI)AfAm    >90  (>60 ml/min/1.73 sqM)  


 


Est GFR (CKD-EPI)NonAf    >90  (>60 ml/min/1.73 sqM)  


 


Glucose    152 H  (74-99)  mg/dL


 


Plasma Lactic Acid Vasu  2.4 H*    (0.7-2.0)  mmol/L


 


Calcium    9.6  (8.4-10.2)  mg/dL


 


Total Bilirubin    0.4  (0.2-1.3)  mg/dL


 


AST    41 H  (14-36)  U/L


 


ALT    33  (4-34)  U/L


 


Alkaline Phosphatase    190 H  ()  U/L


 


Troponin I     (0.000-0.034)  ng/mL


 


NT-Pro-B Natriuret Pep     pg/mL


 


Total Protein    7.8  (6.3-8.2)  g/dL


 


Albumin    4.0  (3.5-5.0)  g/dL


 


TSH    2.890  (0.465-4.680)  mIU/L


 


Influenza Type A RNA     (Not Detectd)  


 


Influenza Type B (PCR)     (Not Detectd)  














  01/22/20 01/22/20 01/22/20 Range/Units





  11:01 11:01 11:01 


 


WBC     (3.8-10.6)  k/uL


 


RBC     (3.80-5.40)  m/uL


 


Hgb     (11.4-16.0)  gm/dL


 


Hct     (34.0-46.0)  %


 


MCV     (80.0-100.0)  fL


 


MCH     (25.0-35.0)  pg


 


MCHC     (31.0-37.0)  g/dL


 


RDW     (11.5-15.5)  %


 


Plt Count     (150-450)  k/uL


 


Neutrophils %     %


 


Lymphocytes %     %


 


Monocytes %     %


 


Eosinophils %     %


 


Basophils %     %


 


Neutrophils #     (1.3-7.7)  k/uL


 


Lymphocytes #     (1.0-4.8)  k/uL


 


Monocytes #     (0-1.0)  k/uL


 


Eosinophils #     (0-0.7)  k/uL


 


Basophils #     (0-0.2)  k/uL


 


Anisocytosis     


 


Macrocytosis     


 


PT  10.5    (9.0-12.0)  sec


 


INR  1.0    (<1.2)  


 


APTT  25.3    (22.0-30.0)  sec


 


D-Dimer  0.26    (<0.60)  mg/L FEU


 


Sodium     (137-145)  mmol/L


 


Potassium     (3.5-5.1)  mmol/L


 


Chloride     ()  mmol/L


 


Carbon Dioxide     (22-30)  mmol/L


 


Anion Gap     mmol/L


 


BUN     (7-17)  mg/dL


 


Creatinine     (0.52-1.04)  mg/dL


 


Est GFR (CKD-EPI)AfAm     (>60 ml/min/1.73 sqM)  


 


Est GFR (CKD-EPI)NonAf     (>60 ml/min/1.73 sqM)  


 


Glucose     (74-99)  mg/dL


 


Plasma Lactic Acid Vasu     (0.7-2.0)  mmol/L


 


Calcium     (8.4-10.2)  mg/dL


 


Total Bilirubin     (0.2-1.3)  mg/dL


 


AST     (14-36)  U/L


 


ALT     (4-34)  U/L


 


Alkaline Phosphatase     ()  U/L


 


Troponin I   <0.012   (0.000-0.034)  ng/mL


 


NT-Pro-B Natriuret Pep    <11  pg/mL


 


Total Protein     (6.3-8.2)  g/dL


 


Albumin     (3.5-5.0)  g/dL


 


TSH     (0.465-4.680)  mIU/L


 


Influenza Type A RNA     (Not Detectd)  


 


Influenza Type B (PCR)     (Not Detectd)  














  01/22/20 Range/Units





  11:05 


 


WBC   (3.8-10.6)  k/uL


 


RBC   (3.80-5.40)  m/uL


 


Hgb   (11.4-16.0)  gm/dL


 


Hct   (34.0-46.0)  %


 


MCV   (80.0-100.0)  fL


 


MCH   (25.0-35.0)  pg


 


MCHC   (31.0-37.0)  g/dL


 


RDW   (11.5-15.5)  %


 


Plt Count   (150-450)  k/uL


 


Neutrophils %   %


 


Lymphocytes %   %


 


Monocytes %   %


 


Eosinophils %   %


 


Basophils %   %


 


Neutrophils #   (1.3-7.7)  k/uL


 


Lymphocytes #   (1.0-4.8)  k/uL


 


Monocytes #   (0-1.0)  k/uL


 


Eosinophils #   (0-0.7)  k/uL


 


Basophils #   (0-0.2)  k/uL


 


Anisocytosis   


 


Macrocytosis   


 


PT   (9.0-12.0)  sec


 


INR   (<1.2)  


 


APTT   (22.0-30.0)  sec


 


D-Dimer   (<0.60)  mg/L FEU


 


Sodium   (137-145)  mmol/L


 


Potassium   (3.5-5.1)  mmol/L


 


Chloride   ()  mmol/L


 


Carbon Dioxide   (22-30)  mmol/L


 


Anion Gap   mmol/L


 


BUN   (7-17)  mg/dL


 


Creatinine   (0.52-1.04)  mg/dL


 


Est GFR (CKD-EPI)AfAm   (>60 ml/min/1.73 sqM)  


 


Est GFR (CKD-EPI)NonAf   (>60 ml/min/1.73 sqM)  


 


Glucose   (74-99)  mg/dL


 


Plasma Lactic Acid Vasu   (0.7-2.0)  mmol/L


 


Calcium   (8.4-10.2)  mg/dL


 


Total Bilirubin   (0.2-1.3)  mg/dL


 


AST   (14-36)  U/L


 


ALT   (4-34)  U/L


 


Alkaline Phosphatase   ()  U/L


 


Troponin I   (0.000-0.034)  ng/mL


 


NT-Pro-B Natriuret Pep   pg/mL


 


Total Protein   (6.3-8.2)  g/dL


 


Albumin   (3.5-5.0)  g/dL


 


TSH   (0.465-4.680)  mIU/L


 


Influenza Type A RNA  Not Detected  (Not Detectd)  


 


Influenza Type B (PCR)  Not Detected  (Not Detectd)  














Disposition


Clinical Impression: 


 Cough, SOB (shortness of breath), Pulmonary vascular congestion, Strep ph

aryngitis





Disposition: ADMITTED AS IP TO THIS [facility]


Condition: Stable


Is patient prescribed a controlled substance at d/c from ED?: No


Time of Disposition: 12:51


Decision to Admit Reason: Admit from EC


Decision Date: 01/22/20


Decision Time: 12:51

## 2020-01-23 LAB
ANION GAP SERPL CALC-SCNC: 8 MMOL/L
BASOPHILS # BLD AUTO: 0 K/UL (ref 0–0.2)
BASOPHILS NFR BLD AUTO: 0 %
BUN SERPL-SCNC: 14 MG/DL (ref 7–17)
CALCIUM SPEC-MCNC: 9.8 MG/DL (ref 8.4–10.2)
CHLORIDE SERPL-SCNC: 102 MMOL/L (ref 98–107)
CO2 SERPL-SCNC: 31 MMOL/L (ref 22–30)
EOSINOPHIL # BLD AUTO: 0.1 K/UL (ref 0–0.7)
EOSINOPHIL NFR BLD AUTO: 0 %
ERYTHROCYTE [DISTWIDTH] IN BLOOD BY AUTOMATED COUNT: 4.49 M/UL (ref 3.8–5.4)
ERYTHROCYTE [DISTWIDTH] IN BLOOD: 18.2 % (ref 11.5–15.5)
GLUCOSE BLD-MCNC: 283 MG/DL (ref 75–99)
GLUCOSE BLD-MCNC: 304 MG/DL (ref 75–99)
GLUCOSE BLD-MCNC: 366 MG/DL (ref 75–99)
GLUCOSE BLD-MCNC: 383 MG/DL (ref 75–99)
GLUCOSE SERPL-MCNC: 321 MG/DL (ref 74–99)
HCT VFR BLD AUTO: 46.9 % (ref 34–46)
HGB BLD-MCNC: 14.7 GM/DL (ref 11.4–16)
LYMPHOCYTES # SPEC AUTO: 1.3 K/UL (ref 1–4.8)
LYMPHOCYTES NFR SPEC AUTO: 12 %
MCH RBC QN AUTO: 32.8 PG (ref 25–35)
MCHC RBC AUTO-ENTMCNC: 31.4 G/DL (ref 31–37)
MCV RBC AUTO: 104.4 FL (ref 80–100)
MONOCYTES # BLD AUTO: 0.3 K/UL (ref 0–1)
MONOCYTES NFR BLD AUTO: 3 %
NEUTROPHILS # BLD AUTO: 9.1 K/UL (ref 1.3–7.7)
NEUTROPHILS NFR BLD AUTO: 84 %
PLATELET # BLD AUTO: 388 K/UL (ref 150–450)
POTASSIUM SERPL-SCNC: 4.7 MMOL/L (ref 3.5–5.1)
SODIUM SERPL-SCNC: 141 MMOL/L (ref 137–145)
WBC # BLD AUTO: 10.8 K/UL (ref 3.8–10.6)

## 2020-01-23 RX ADMIN — IPRATROPIUM BROMIDE AND ALBUTEROL SULFATE PRN ML: .5; 3 SOLUTION RESPIRATORY (INHALATION) at 11:41

## 2020-01-23 RX ADMIN — IPRATROPIUM BROMIDE AND ALBUTEROL SULFATE SCH ML: .5; 3 SOLUTION RESPIRATORY (INHALATION) at 20:46

## 2020-01-23 RX ADMIN — FUROSEMIDE SCH MG: 10 INJECTION, SOLUTION INTRAMUSCULAR; INTRAVENOUS at 21:07

## 2020-01-23 RX ADMIN — LEVOTHYROXINE SODIUM SCH MCG: 75 TABLET ORAL at 06:22

## 2020-01-23 RX ADMIN — INSULIN ASPART SCH UNIT: 100 INJECTION, SOLUTION INTRAVENOUS; SUBCUTANEOUS at 12:52

## 2020-01-23 RX ADMIN — PANTOPRAZOLE SODIUM SCH MG: 40 TABLET, DELAYED RELEASE ORAL at 21:07

## 2020-01-23 RX ADMIN — AZITHROMYCIN SCH MG: 500 TABLET, FILM COATED ORAL at 08:32

## 2020-01-23 RX ADMIN — IPRATROPIUM BROMIDE AND ALBUTEROL SULFATE PRN ML: .5; 3 SOLUTION RESPIRATORY (INHALATION) at 07:49

## 2020-01-23 RX ADMIN — FUROSEMIDE SCH MG: 10 INJECTION, SOLUTION INTRAMUSCULAR; INTRAVENOUS at 08:33

## 2020-01-23 RX ADMIN — INSULIN ASPART SCH UNIT: 100 INJECTION, SOLUTION INTRAVENOUS; SUBCUTANEOUS at 17:49

## 2020-01-23 RX ADMIN — IPRATROPIUM BROMIDE AND ALBUTEROL SULFATE SCH ML: .5; 3 SOLUTION RESPIRATORY (INHALATION) at 15:52

## 2020-01-23 RX ADMIN — INSULIN ASPART SCH UNIT: 100 INJECTION, SOLUTION INTRAVENOUS; SUBCUTANEOUS at 06:22

## 2020-01-23 RX ADMIN — BENZONATATE SCH MG: 100 CAPSULE ORAL at 17:00

## 2020-01-23 RX ADMIN — POTASSIUM CHLORIDE SCH MEQ: 750 TABLET, EXTENDED RELEASE ORAL at 21:08

## 2020-01-23 RX ADMIN — ROPINIROLE SCH MG: 4 TABLET, FILM COATED ORAL at 21:08

## 2020-01-23 RX ADMIN — ATORVASTATIN CALCIUM SCH MG: 10 TABLET, FILM COATED ORAL at 21:08

## 2020-01-23 RX ADMIN — INSULIN ASPART SCH UNIT: 100 INJECTION, SOLUTION INTRAVENOUS; SUBCUTANEOUS at 21:09

## 2020-01-23 RX ADMIN — RIVAROXABAN SCH MG: 20 TABLET, FILM COATED ORAL at 21:08

## 2020-01-23 RX ADMIN — BENZONATATE SCH MG: 100 CAPSULE ORAL at 21:08

## 2020-01-23 RX ADMIN — METHYLPREDNISOLONE SODIUM SUCCINATE SCH MG: 40 INJECTION, POWDER, FOR SOLUTION INTRAMUSCULAR; INTRAVENOUS at 17:00

## 2020-01-23 RX ADMIN — METHYLPREDNISOLONE SODIUM SUCCINATE SCH MG: 40 INJECTION, POWDER, FOR SOLUTION INTRAMUSCULAR; INTRAVENOUS at 23:20

## 2020-01-23 RX ADMIN — METHYLPREDNISOLONE SODIUM SUCCINATE SCH MG: 40 INJECTION, POWDER, FOR SOLUTION INTRAMUSCULAR; INTRAVENOUS at 08:33

## 2020-01-23 NOTE — P.PN
Subjective


Progress Note Date: 01/23/20


Principal diagnosis: 





Patient is a 59-year-old female with a known history of asthma, diabetes type 2,

history of DVT/PE currently on xarelto, GERD, hypertension and hypothyroidism 

was sent from Dr. العراقي's office with the complaints of dyspnea, tachycardia 

and hypoxia.  Patient has been having upper respiratory symptoms for the past 1 

week and patient developed heaviness in the chest with cough and minimal sputum 

production.  Denied any chest pain.  Patient has been having upper respiratory 

symptoms along with sore throat.  Patient had positive stress throat at PCPs 

office.  Patient was given antibiotics but patient was not able to  her 

medications by the time she come to the hospital.  Patient also noted to have 

increased leg swelling.  Denied any nausea vomiting.  No abdominal pain.  

Patient has been having diarrhea for 3 days now.  Diarrhea did improve othe

rwise.


Patient denied any fever but does have subjective chills.


Patient states that she had cardiac workup done in July 2019 including cardiac 

catheterization which was nonobstructive and no history of stent placement.


Chest x-ray showed correlate for CHF exacerbation as there is cardiomegaly and m

ild vascular congestion.


BNP less than 11 otherwise.  Patient was started on IV diuresis with Lasix 20 mg

every 12.


Patient was given a dose of Zosyn and azithromycin in the ER.


CT angiogram showed suboptimal study.  No evidence of pulmonary embolism.  No 

focal infiltrate was noted.  Vascular congestion.


Troponin 1 negative


WBC 11.7


Lactic acid 2.4


D-dimer not elevated.  Influenza negative.





01/23/2020


Patient is sitting up in the chair and appears to be in no acute distress.  

Patient states that her breathing has slightly improved but continues to have 

some wheezing.  Patient's blood sugars have been slightly elevated and is being 

covered with sliding scale.  Patient is currently maintained on IV Lasix along 

with IV steroids and will continue at this time.  Currently patient denies any c

hest pain, worsening shortness of breath, or palpitations.  Patient has been 

afebrile.  Patient denies any nausea or vomiting and has been tolerating diet.  

Pulmonary is following and awaiting cardiology consult.  Patient underwent an 

echo which shows overall left ventricular systolic function is low to normal 

with an EF between 50 and 55% with mild mitral and tricuspid regurgitation 

present with no evidence of pulmonary hypertension.  Will likely transition 

Lasix and steroids to oral in the morning.





Objective





- Vital Signs


Vital signs: 


                                   Vital Signs











Temp  97.5 F L  01/23/20 04:00


 


Pulse  97   01/23/20 12:00


 


Resp  16   01/23/20 12:00


 


BP  110/69   01/23/20 12:00


 


Pulse Ox  92 L  01/23/20 12:00








                                 Intake & Output











 01/22/20 01/23/20 01/23/20





 18:59 06:59 18:59


 


Intake Total 360 240 720


 


Output Total  700 1600


 


Balance 360 460 -520


 


Weight 122.9 kg 121.7 kg 121.7 kg


 


Intake:   


 


  Oral 360 240 720


 


Output:   


 


  Urine  700 1600














- Exam





Patient is sitting up in the chair comfortably, no acute distress, awake alert 

and oriented eating lunch.. 


HEENT: Normocephalic. Neck is supple. Pupils reactive. Nostrils clear. Oral 

cavity is moist. Ears reveal no drainage. 


Neck reveals no JVD, carotid bruits, or thyromegaly. 


CHEST EXAMINATION: Trachea is central. Symmetrical expansion.  Bibasilar 

diminished sounds and a few scattered rhonchi and mild expiratory wheeze. 


CARDIAC: Normal S1, S2 with no gallops. No murmurs 


ABDOMEN: Soft. Bowel sounds normal. No organomegaly. No abdominal bruits. 


Extremities: Trace bilateral edema.  Slight improvement.  No clubbing or 

cyanosis


Neurologically awake, alert, oriented x3 with well-coordinated movements.  No 

focal deficits noted


Skin: No rash or skin lesions. 


Psychiatric: Cooperative.  Non-suicidal


Musculoskeletal: No joint swelling or deformity.  Normal range of motion.











- Labs


CBC & Chem 7: 


                                 01/23/20 05:52





                                 01/23/20 05:52


Labs: 


                  Abnormal Lab Results - Last 24 Hours (Table)











  01/22/20 01/22/20 01/23/20 Range/Units





  16:51 20:57 05:52 


 


WBC    10.8 H  (3.8-10.6)  k/uL


 


Hct    46.9 H  (34.0-46.0)  %


 


MCV    104.4 H  (80.0-100.0)  fL


 


RDW    18.2 H  (11.5-15.5)  %


 


Neutrophils #    9.1 H  (1.3-7.7)  k/uL


 


Carbon Dioxide     (22-30)  mmol/L


 


Glucose     (74-99)  mg/dL


 


POC Glucose (mg/dL)  167 H  389 H   (75-99)  mg/dL














  01/23/20 01/23/20 01/23/20 Range/Units





  05:52 06:05 12:04 


 


WBC     (3.8-10.6)  k/uL


 


Hct     (34.0-46.0)  %


 


MCV     (80.0-100.0)  fL


 


RDW     (11.5-15.5)  %


 


Neutrophils #     (1.3-7.7)  k/uL


 


Carbon Dioxide  31 H    (22-30)  mmol/L


 


Glucose  321 H    (74-99)  mg/dL


 


POC Glucose (mg/dL)   304 H  283 H  (75-99)  mg/dL








                      Microbiology - Last 24 Hours (Table)











 01/22/20 11:01 Blood Culture - Preliminary





 Blood    No Growth after 24 hours














Assessment and Plan


Assessment: 





Acute hypoxic respiratory failure secondary to asthma exacerbation which is due 

to URI


Pulmonary vascular congestion.  Unlikely CHF with a BNP less than 11


Positive strep throat


History of DVT/PE currently on xarelto


Hypertension


Diabetes type 2 non-insulin-dependent


Hypothyroidism


Anxiety/depression


History of colon polyps


DVT prophylaxis patient is already on full anticoagulation





Plan:


Patient will be continued on bronchodilators, IV steroids, and IV Lasix at this 

time.  May transition to oral Lasix and prednisone tomorrow.  Will continue to 

monitor labs and vital signs closely.  Will continue with sliding scale for 

coverage as her blood sugars expected to be elevated due to steroids and history

of diabetes mellitus type 2.  Continue with antibiotics in the form of 

ceftriaxone and azithromycin.  Continue with oxygen therapy.  Pulmonary is foll

owing and currently awaiting a cardiology consult.  Patient underwent a 2-D echo

which is showing overall left ventricular systolic function is low to normal 

with an EF between 50 and 55% with mild mitral and tricuspid regurgitation 

present with no evidence of pulmonary hypertension.  Further recommendations 

based on the clinical course.  Possible discharge in 24-48 hours.

## 2020-01-23 NOTE — ECHOF
Referral Reason:Dyspnea



MEASUREMENTS

--------

HEIGHT: 160.0 cm

WEIGHT: 122.5 kg

BP: 

RVIDd:   3.8 cm     (< 3.3)

IVSd:   1.1 cm     (0.6 - 1.1)

LVIDd:   4.5 cm     (3.9 - 5.3)

LVPWd:   1.4 cm     (0.6 - 1.1)

IVSs:   1.2 cm

LVIDs:   3.6 cm

LVPWs:   1.7 cm

LAESV Index (A-L):   27.07 ml/m

Ao Diam:   2.8 cm     (2.0 - 3.7)

LA Diam:   3.6 cm     (2.7 - 3.8)

MV EXCURSION:   18.351 mm     (> 18.000)

MV EF SLOPE:   73 mm/s     (70 - 150)

EPSS:   0.3 cm

MV E Gerhard:   0.48 m/s

MV DecT:   198 ms

MV A Gerhard:   0.71 m/s

MV E/A Ratio:   0.68 

RAP:   5.00 mmHg

RVSP:   19.26 mmHg







FINDINGS

--------

Sinus rhythm.

Morbid Obesity

This was a techncally difficult study with suboptimal views, , Lumason utilized for enhancement of im
ages.

The left ventricular size is normal.   There is mild concentric left ventricular hypertrophy.   Overa
ll left ventricular systolic function is low-normal with, an EF between 50 - 55 %.

The right ventricle is mildly enlarged.

The left atrial size is normal.

The right atrial size is normal.

5.0mg OF Lumason UTLIZED: 2 OR MORE WALL SEGMENTS NOT VISUALIZED.

The aortic valve was not well visualized.

Mild mitral regurgitation is present.

Mild tricuspid regurgitation present.   Right ventricular systolic pressure is normal at < 35 mmHg.  
 There is no evidence of pulmonary hypertension.

The pulmonic valve was not well visualized.

There is no pericardial effusion.



CONCLUSIONS

--------

1. Sinus rhythm.

2. Morbid Obesity

3. This was a techncally difficult study with suboptimal views, , Lumason utilized for enhancement of
 images.

4. The left ventricular size is normal.

5. There is mild concentric left ventricular hypertrophy.

6. Overall left ventricular systolic function is low-normal with, an EF between 50 - 55 %.

7. The right ventricle is mildly enlarged.

8. The left atrial size is normal.

9. The right atrial size is normal.

10. 5.0mg OF Lumason UTLIZED: 2 OR MORE WALL SEGMENTS NOT VISUALIZED.

11. The aortic valve was not well visualized.

12. Mild mitral regurgitation is present.

13. Mild tricuspid regurgitation present.

14. Right ventricular systolic pressure is normal at < 35 mmHg.

15. There is no evidence of pulmonary hypertension.

16. The pulmonic valve was not well visualized.

17. There is no pericardial effusion.





SONOGRAPHER: Zenia Denny RDCS

## 2020-01-23 NOTE — P.CRDCN
History of Present Illness


History of present illness: 


This is Mireille Valerio PA-C dictating a consult on this patient


The patient was interviewed and examined by me as well as by Dr. Haq


Case discussed with Dr. Haq and he agrees with the plan of care





IMPRESSION / ASSESSMENT: 


Worsening shortness of breath, cough and congestion, chest x-ray showing venous 

congestion however BNP less than 11, echo showing normal EF, not suggestive of 

CHF, likely respiratory/infectious etiology, pulmonology following


Intermittent atypical chest pain, troponins negative, no acute changes on EKG, 

recent coronary angiogram 6 months ago showing normal coronary arteries


Hypertension


Dyslipidemia


Diabetes


History of asthma


History of pulmonary embolism, anticoagulated with Xarelto





PLAN:


Continue current cardiac medication regimen


No further cardiac workup at this time





HPI


Patient is a 59-year-old female past medical history of hypertension, 

dyslipidemia, diabetes, asthma, history of pulmonary embolism on Xarelto, who 

presented with complaints of shortness of breath.  She follows with Dr. Martinez. 

For the last week she has had a cough and congestion as well as a sore throat.  

She feels more short of breath on exertion, such as with walking or activities 

of daily living such as cooking.  She is also been fatigued.  Denies fevers.  C

omplains of intermittent chest pressure.  She went to see her primary care 

doctor.  She was found to be positive for strep throat.  He was concerned due to

her history of PE and sent her to the emergency department for further 

evaluation.  Upon arrival her vital signs were stable.  EKG showed sinus 

mechanism with no acute ST or T-wave abnormalities.  Chest x-ray showed mild 

central vascular congestion.  Chest CT showed no large PE, images percent 

optimal and some segmental PE could not be entirely excluded.  Echo showed mild 

concentric hypertrophy, EF 50-55%, no significant valvular abnormalities.  BNP 

was less than 11.  She has been started on antibiotics.  Patient seen and 

examined sitting up in the chair.  States her breathing has improved somewhat 

but she is still short of breath.  Continues to cough.  Sore throat is 

improving.  No chest pain currently.  No palpitations or dizziness.





ROS: 


No fevers, chills or rigors, 


Positive for dry cough, congestion


no nausea, vomiting or diarrhea, 


no hematuria, dysuria, 


no musculoskeletal complaints, 


no strokes or seizures, 


no skin lesions.





EXAMINATION:


Patient is afebrile, pulse in the 80s, respirations 16, blood pressure 110/69, 

oxygen saturation 92% on room air


Patient seen and examined sitting in the chair, in no acute distress


Lungs mildly diminished bilaterally with few scattered rhonchi


Heart is regular, no audible murmurs


No elevated JVD


Mild lower extremity edema bilaterally





REVIEW OF LABS, ECG & MEDICAL DATA


WBC 10.8, hemoglobin 14.7, platelets 388, potassium 4.7, BUN 14, creatinine 0.5 

to


Troponin less then 0.012


Previous coronary angiogram July 2019 showed normal coronary arteries








Past Medical History


Past Medical History: Asthma, Blood Disorder, Cancer, Chest Pain / Angina, 

Diabetes Mellitus, Deep Vein Thrombosis (DVT), GERD/Reflux, Hypertension, 

Pneumonia, Pulmonary Embolus (PE), Thyroid Disorder


Additional Past Medical History / Comment(s): ca-thyroid, PE & DVT 2015,  hx 

colon polyps, not sure what clotting disorder is called


History of Any Multi-Drug Resistant Organisms: None Reported


Past Surgical History: Breast Surgery, Hysterectomy


Additional Past Surgical History / Comment(s): thyroidectomy, spleenectomy, 3 

lumps removed in breast. large lymph node removed from under the left arm, 

COLONOSCOPY, bladder resection


Past Anesthesia/Blood Transfusion Reactions: No Reported Reaction


Past Psychological History: Anxiety, Depression


Smoking Status: Never smoker


Past Alcohol Use History: None Reported


Past Drug Use History: None Reported





- Past Family History


  ** mother


Family Medical History: Cancer


Additional Family Medical History / Comment(s): colon





  ** brother


Family Medical History: Cancer


Additional Family Medical History / Comment(s): liver, kidneys, pancreas, clots





  ** sister


Family Medical History: Cancer


Additional Family Medical History / Comment(s): breast ca, clots





Medications and Allergies


                                Home Medications











 Medication  Instructions  Recorded  Confirmed  Type


 


Furosemide [Lasix] 40 mg PO QAM 02/05/15 01/22/20 History


 


Potassium Chloride [Klor-Con 10] 10 meq PO HS 02/05/15 01/22/20 History


 


metFORMIN HCL 1,000 mg PO AC-BID 05/01/17 01/22/20 History


 


rOPINIRole HCL [Requip] 4 mg PO HS 05/03/17 01/22/20 History


 


Levothyroxine Sodium [Synthroid] 150 mcg PO MOTUWETHFRSA 04/13/18 01/22/20 

History


 


Rivaroxaban [Xarelto] 20 mg PO HS 04/13/18 01/22/20 History


 


traZODone HCL [Desyrel] 150 mg PO HS 04/13/18 01/22/20 History


 


Atorvastatin [Lipitor] 10 mg PO HS 03/27/19 01/22/20 History


 


Omeprazole 20 mg PO HS 03/27/19 01/22/20 History


 


Pioglitazone [Actos] 30 mg PO DAILY 03/27/19 01/22/20 History


 


traMADol HCL [Ultram] 50 mg PO HS 03/27/19 01/22/20 History


 


Cetirizine HCl 10 mg PO HS 10/21/19 01/22/20 History


 


Levothyroxine Sodium [Synthroid] 225 mcg PO HAAS 01/22/20 01/22/20 History








                                    Allergies











Allergy/AdvReac Type Severity Reaction Status Date / Time


 


latex Allergy  Rash/Hives Verified 01/22/20 12:23


 


sitagliptin [From Januvia] Allergy  Rash/Hives Verified 01/22/20 12:23














Physical Exam


Vitals: 


                                   Vital Signs











  Temp Pulse Pulse Resp BP Pulse Ox


 


 01/23/20 12:00    97  16  110/69  92 L


 


 01/23/20 11:53   85    


 


 01/23/20 11:41   81    


 


 01/23/20 08:26       93 L


 


 01/23/20 08:03   107 H    


 


 01/23/20 08:00    121 H  16  123/71  95


 


 01/23/20 07:49   104 H    


 


 01/23/20 04:00  97.5 F L   84  17  110/73  92 L


 


 01/23/20 00:00    114 H  17  130/62  94 L


 


 01/22/20 20:22   96    


 


 01/22/20 20:10   104 H    


 


 01/22/20 20:00  98.3 F   110 H  17  178/98  92 L


 


 01/22/20 16:00  98.5 F   104 H  18  112/66  92 L








                                Intake and Output











 01/23/20 01/23/20 01/23/20





 06:59 14:59 22:59


 


Intake Total  720 


 


Output Total 400 1600 


 


Balance -400 -880 


 


Intake:   


 


  Oral  720 


 


Output:   


 


  Urine 400 1600 


 


Other:   


 


  Weight 121.7 kg 121.7 kg 














Results





                                 01/23/20 05:52





                                 01/23/20 05:52


                                       CBC











  01/23/20 Range/Units





  05:52 


 


WBC  10.8 H  (3.8-10.6)  k/uL


 


RBC  4.49  (3.80-5.40)  m/uL


 


Hgb  14.7  (11.4-16.0)  gm/dL


 


Hct  46.9 H  (34.0-46.0)  %


 


Plt Count  388  (150-450)  k/uL








                          Comprehensive Metabolic Panel











  01/23/20 Range/Units





  05:52 


 


Sodium  141  (137-145)  mmol/L


 


Potassium  4.7  (3.5-5.1)  mmol/L


 


Chloride  102  ()  mmol/L


 


Carbon Dioxide  31 H  (22-30)  mmol/L


 


BUN  14  (7-17)  mg/dL


 


Creatinine  0.52  (0.52-1.04)  mg/dL


 


Glucose  321 H  (74-99)  mg/dL


 


Calcium  9.8  (8.4-10.2)  mg/dL








                               Current Medications











Generic Name Dose Route Start Last Admin





  Trade Name Freq  PRN Reason Stop Dose Admin


 


Albuterol/Ipratropium  3 ml  01/23/20 13:27 





  Duoneb 0.5 Mg-3 Mg/3 Ml Soln  INHALATION  





  RT-QID PRN  





  Shortness Of Breath Or Wheezing  


 


Albuterol/Ipratropium  3 ml  01/23/20 16:00 





  Duoneb 0.5 Mg-3 Mg/3 Ml Soln  INHALATION  





  RT-QID MARY  


 


Atorvastatin Calcium  10 mg  01/22/20 21:00  01/22/20 19:56





  Lipitor  PO   10 mg





  HS MARY   Administration


 


Azithromycin  500 mg  01/23/20 09:00  01/23/20 08:32





  Zithromax  PO   500 mg





  DAILY MARY   Administration


 


Benzonatate  100 mg  01/23/20 16:00 





  Tessalon Perles  PO  





  TID MARY  


 


Furosemide  20 mg  01/22/20 17:00  01/23/20 08:33





  Lasix  IV   20 mg





  Q12HR MARY   Administration


 


Guaifenesin  1,200 mg  01/22/20 22:00  01/23/20 08:32





  Mucinex  PO   1,200 mg





  Q12HR MARY   Administration


 


Insulin Aspart  0 unit  01/22/20 21:00  01/23/20 12:52





  Novolog  SQ   7 unit





  ACHS MARY   Administration





  Protocol  


 


Levothyroxine Sodium  150 mcg  01/23/20 06:30  01/23/20 06:22





  Synthroid  PO   150 mcg





  MOTUWETHFRSA MARY   Administration


 


Levothyroxine Sodium  225 mcg  01/26/20 06:30 





  Synthroid  PO  





  HAAS MARY  


 


Methylprednisolone Sodium Succinate  40 mg  01/23/20 00:00  01/23/20 08:33





  Solu-Medrol  IV   40 mg





  Q8HR MARY   Administration


 


Naloxone HCl  0.2 mg  01/22/20 12:48 





  Narcan  IV  





  Q2M PRN  





  Opioid Reversal  


 


Pantoprazole Sodium  40 mg  01/22/20 21:00  01/22/20 19:56





  Protonix  PO   40 mg





  HS MARY   Administration


 


Potassium Chloride  10 meq  01/22/20 21:00  01/22/20 19:56





  K-Dur 10  PO   10 meq





  HS MARY   Administration


 


Rivaroxaban  20 mg  01/22/20 21:00  01/22/20 19:56





  Xarelto  PO   20 mg





  HS MARY   Administration


 


Ropinirole HCl  4 mg  01/22/20 21:00  01/22/20 19:56





  Requip  PO   4 mg





  HS MARY   Administration


 


Tramadol HCl  50 mg  01/22/20 21:00  01/22/20 19:57





  Ultram  PO   50 mg





  HS MARY   Administration


 


Trazodone HCl  150 mg  01/22/20 21:00  01/22/20 19:57





  Desyrel  PO   150 mg





  HS MARY   Administration








                                Intake and Output











 01/23/20 01/23/20 01/23/20





 06:59 14:59 22:59


 


Intake Total  720 


 


Output Total 400 1600 


 


Balance -400 -880 


 


Intake:   


 


  Oral  720 


 


Output:   


 


  Urine 400 1600 


 


Other:   


 


  Weight 121.7 kg 121.7 kg 








                                 Patient Weight











 01/24/20





 06:59


 


Weight 121.7 kg








                                        





                                 01/23/20 05:52 





                                 01/23/20 05:52

## 2020-01-23 NOTE — CONS
CONSULTATION



PULMONARY/CRITICAL CARE CONSULTATION:



DATE OF SERVICE:

01/23/2020.



CHIEF COMPLAINT:

Shortness of breath/upper respiratory tract infection.



This is a 59-year-old female with a history of thyroidectomy, splenectomy, 
thyroid

cancer, pulmonary embolism, DVT, who apparently presents to the emergency room 
with

complaints of a week's worth of symptoms of an upper respiratory tract 
infection.  She

apparently had sore throat and did test positive for strep, she also apparently 
had

some cough and chest congestion, not producing any phlegm.  She also had some 
shortness

of breath.  She denied any chest pain or chest discomfort.  She did admit to the
fact

that her legs felt more swollen than usual.  She denied any nausea, vomiting or

diarrhea.  There is no abdominal pain.  There is no genitourinary complaint.  
She also

had a cardiac catheterization July 2019 which apparently was normal.  She denies
any

fever, but does admit to chills.  She was started on some antibiotic for a strep

infection by her primary doctor.  And apparently sent to the emergency room for 
further

evaluation.  The patient does have a history of chronic bronchial asthma, but 
does not

feel like her asthma is acting up.  Her chest x-ray and her CT scan were 
consistent

with heart failure, but her N terminal proBNP, interestingly, was normal.  The 
picture

is somewhat confusing as it in part suggested upper respiratory tract 
infection/acute

bronchitis, but also heart failure.



HOME MEDICATIONS:

Include Lasix, potassium, metformin, Requip, levothyroxine, Xarelto, trazodone,

Lipitor, omeprazole, Actos, tramadol, Zyrtec, and levothyroxine.



ALLERGIES:

LATEX and JANUVIA.



PAST MEDICAL HISTORY:

Includes asthma, which is relatively stable, mostly inactive, and she really 
takes no

medications for it, angina pectoris, clean coronary arteries, diabetes, DVT, 
GERD,

hypertension, pneumonia, pulmonary embolism, and thyroid cancer.  She also has a

history of colonic polyps, and some sort of clotting disorder.  This might 
include

something like Leiden factor deficiency or factor 5 deficiency, MTHFR mutation, 
or one

of the other clotting factors or hypercoagulable state.



SURGICAL HISTORY:

Includes hysterectomy, thyroidectomy, splenectomy, excision of a lump from her 
breast,

a large lymph node excised from underneath her arm, and colonoscopy.  She also 
has a

history of other minor procedures not mentioned.



SOCIAL HISTORY:

Negative for tobacco.  She denies any alcohol or illicit drug use.



FAMILY HISTORY:

Positive for mother with colon cancer, a brother with liver, kidney and pancreas

cancer, and a sister with breast cancer and blood clots.



REVIEW OF SYSTEMS:

CONSTITUTIONAL:  Negative.

NEUROLOGIC:  Negative.

HEENT:  Sore throat/strep throat.

CARDIOVASCULAR: Negative.

PULMONARY: Shortness of breath, nonproductive cough.

GI:  Negative.

:  Negative.

RHEUMATOLOGIC: Negative.

IMMUNOLOGIC:  Negative.

ENDOCRINOLOGIC: Negative.

DERMATOLOGIC:  Negative.



Current vital signs are reviewed.  Temperature is 97.5, heart rate 84, 
respiratory rate

16, blood pressure 123/71 mean 88, room air saturation 95%.  She appears in no 
acute

distress.  She is not having any difficulty at all with her breathing.  There is
no

conversational dyspnea, audible wheezing or use is the use of accessory muscles.
 She

is sitting in the chair at the bedside.  She is oriented x3.

HEENT: Examination is grossly unremarkable.  No supplemental oxygen noted.

NECK:  Supple, full range of motion.  No adenopathy or thyromegaly.  Neck veins 
are

flat.

CARDIOVASCULAR: Examination reveals regular rhythm and rate.

HEART:  Rate is about 90 beats per minute.  S1, S2 normal.

LUNGS:  Reveal some coarse rhonchi.  Her cough is rather coarse and wet 
sounding.  It

is also very harsh.  No wheezes.  A few scattered rhonchi.  No crackles.

ABDOMEN:  Soft.  Bowel sounds are heard.

EXTREMITIES are intact.  Mild edema.

SKIN: Without rash.

NEUROLOGIC: Examination is brief but nonfocal.



X-RAY:

From the 20 second in my opinion clearly shows a pattern of cardiomegaly and 
some

cephalization, small effusion, and a prior of early CHF.  In addition, the CT 
angiogram

was essentially negative for any central pulmonary emboli.  This study was 
suboptimal.

Some more distant emboli could not be seen.  The CT scan was also consistent 
with fluid

overload/CHF along with cardiomegaly.



LABORATORY STUDIES:

Reviewed.  White count 10.8, hemoglobin 15.7, hematocrit 46.9, platelet count 
208,000.

PT;INR. PTT normal.  D-dimer normal.  Sodium, potassium normal. Chloride is 102,
CO2 is

31 anion gap 18 14.2 and troponin was less than 0.012 N terminal proBNP less 
than.  TSH

is normal.  Influenza studies are negative.



ASSESSMENT:

1. A confusing picture of acute respiratory complaints,  primarily shortness of

    breath and nonproductive cough, which partly suggested upper respiratory 
tract

    infection/acute bronchitis with with bronchial inflammation but also could 
reflect 

    some mild fluid overload, though her N terminal

    proBNP is normal.

2. History of relatively recent cardiac catheterization showing clean coronary

    arteries.

3. History of thyroid cancer, status post thyroidectomy.

4. Prior history of PE and DVT in 2015.

5. Hypercoagulable state, unknown to patient.

6. History of mild asthma, not currently being treated and not active.

7. Angina pectoris.

8. Diabetes mellitus.

9. Gastroesophageal reflux disease.

10.Hypertension.

11.Higher prior history of pneumonia.

12.History of history of colonic polyps.



PLAN:

The patient will be treated in a short course of antibiotics and some 
corticosteroids.

Will also add some updrafts.  Additional recommendations and suggestions are

forthcoming.  Will await opinion from Cardiology as to whether not they think 
she has

some underlying congestive heart failure.  Interestingly, her N terminal proBNP 
was

normal.  Her chest x-ray and CT scan are consistent with fluid overload.  She is
known

to have clean coronary arteries.  Additional recommendations and suggestions are

forthcoming.





MMODL / IJN: [id number] / Job#: [id number]

KAILYN

## 2020-01-24 LAB
GLUCOSE BLD-MCNC: 338 MG/DL (ref 75–99)
GLUCOSE BLD-MCNC: 351 MG/DL (ref 75–99)
GLUCOSE BLD-MCNC: 376 MG/DL (ref 75–99)
GLUCOSE BLD-MCNC: 393 MG/DL (ref 75–99)
HBA1C MFR BLD: 8.5 % (ref 4–6)

## 2020-01-24 RX ADMIN — BENZONATATE SCH MG: 100 CAPSULE ORAL at 08:33

## 2020-01-24 RX ADMIN — IPRATROPIUM BROMIDE AND ALBUTEROL SULFATE SCH ML: .5; 3 SOLUTION RESPIRATORY (INHALATION) at 15:32

## 2020-01-24 RX ADMIN — FUROSEMIDE SCH MG: 10 INJECTION, SOLUTION INTRAMUSCULAR; INTRAVENOUS at 08:34

## 2020-01-24 RX ADMIN — INSULIN ASPART SCH UNIT: 100 INJECTION, SOLUTION INTRAVENOUS; SUBCUTANEOUS at 21:42

## 2020-01-24 RX ADMIN — IPRATROPIUM BROMIDE AND ALBUTEROL SULFATE SCH ML: .5; 3 SOLUTION RESPIRATORY (INHALATION) at 18:58

## 2020-01-24 RX ADMIN — INSULIN ASPART SCH UNIT: 100 INJECTION, SOLUTION INTRAVENOUS; SUBCUTANEOUS at 17:14

## 2020-01-24 RX ADMIN — INSULIN ASPART SCH UNIT: 100 INJECTION, SOLUTION INTRAVENOUS; SUBCUTANEOUS at 06:27

## 2020-01-24 RX ADMIN — POTASSIUM CHLORIDE SCH MEQ: 750 TABLET, EXTENDED RELEASE ORAL at 21:41

## 2020-01-24 RX ADMIN — FUROSEMIDE SCH MG: 10 INJECTION, SOLUTION INTRAMUSCULAR; INTRAVENOUS at 21:18

## 2020-01-24 RX ADMIN — LEVOTHYROXINE SODIUM SCH MCG: 75 TABLET ORAL at 06:27

## 2020-01-24 RX ADMIN — AZITHROMYCIN SCH MG: 500 TABLET, FILM COATED ORAL at 08:33

## 2020-01-24 RX ADMIN — INSULIN ASPART SCH UNIT: 100 INJECTION, SOLUTION INTRAVENOUS; SUBCUTANEOUS at 12:09

## 2020-01-24 RX ADMIN — METHYLPREDNISOLONE SODIUM SUCCINATE SCH MG: 40 INJECTION, POWDER, FOR SOLUTION INTRAMUSCULAR; INTRAVENOUS at 16:46

## 2020-01-24 RX ADMIN — METHYLPREDNISOLONE SODIUM SUCCINATE SCH MG: 40 INJECTION, POWDER, FOR SOLUTION INTRAMUSCULAR; INTRAVENOUS at 23:32

## 2020-01-24 RX ADMIN — ROPINIROLE SCH MG: 4 TABLET, FILM COATED ORAL at 21:19

## 2020-01-24 RX ADMIN — METHYLPREDNISOLONE SODIUM SUCCINATE SCH MG: 40 INJECTION, POWDER, FOR SOLUTION INTRAMUSCULAR; INTRAVENOUS at 08:33

## 2020-01-24 RX ADMIN — PANTOPRAZOLE SODIUM SCH MG: 40 TABLET, DELAYED RELEASE ORAL at 21:41

## 2020-01-24 RX ADMIN — IPRATROPIUM BROMIDE AND ALBUTEROL SULFATE SCH ML: .5; 3 SOLUTION RESPIRATORY (INHALATION) at 11:31

## 2020-01-24 RX ADMIN — ATORVASTATIN CALCIUM SCH MG: 10 TABLET, FILM COATED ORAL at 21:18

## 2020-01-24 RX ADMIN — BENZONATATE SCH MG: 100 CAPSULE ORAL at 16:46

## 2020-01-24 RX ADMIN — RIVAROXABAN SCH MG: 20 TABLET, FILM COATED ORAL at 23:33

## 2020-01-24 RX ADMIN — BENZONATATE SCH MG: 100 CAPSULE ORAL at 21:23

## 2020-01-24 RX ADMIN — IPRATROPIUM BROMIDE AND ALBUTEROL SULFATE SCH: .5; 3 SOLUTION RESPIRATORY (INHALATION) at 08:14

## 2020-01-24 NOTE — P.PN
Subjective


Progress Note Date: 01/24/20


Principal diagnosis: 





Patient is a 59-year-old female with a known history of asthma, diabetes type 2,

history of DVT/PE currently on xarelto, GERD, hypertension and hypothyroidism 

was sent from Dr. العراقي's office with the complaints of dyspnea, tachycardia 

and hypoxia.  Patient has been having upper respiratory symptoms for the past 1 

week and patient developed heaviness in the chest with cough and minimal sputum 

production.  Denied any chest pain.  Patient has been having upper respiratory 

symptoms along with sore throat.  Patient had positive stress throat at PCPs 

office.  Patient was given antibiotics but patient was not able to  her 

medications by the time she come to the hospital.  Patient also noted to have 

increased leg swelling.  Denied any nausea vomiting.  No abdominal pain.  

Patient has been having diarrhea for 3 days now.  Diarrhea did improve othe

rwise.


Patient denied any fever but does have subjective chills.


Patient states that she had cardiac workup done in July 2019 including cardiac 

catheterization which was nonobstructive and no history of stent placement.


Chest x-ray showed correlate for CHF exacerbation as there is cardiomegaly and m

ild vascular congestion.


BNP less than 11 otherwise.  Patient was started on IV diuresis with Lasix 20 mg

every 12.


Patient was given a dose of Zosyn and azithromycin in the ER.


CT angiogram showed suboptimal study.  No evidence of pulmonary embolism.  No 

focal infiltrate was noted.  Vascular congestion.


Troponin 1 negative


WBC 11.7


Lactic acid 2.4


D-dimer not elevated.  Influenza negative.





01/23/2020


Patient is sitting up in the chair and appears to be in no acute distress.  

Patient states that her breathing has slightly improved but continues to have 

some wheezing.  Patient's blood sugars have been slightly elevated and is being 

covered with sliding scale.  Patient is currently maintained on IV Lasix along 

with IV steroids and will continue at this time.  Currently patient denies any c

hest pain, worsening shortness of breath, or palpitations.  Patient has been 

afebrile.  Patient denies any nausea or vomiting and has been tolerating diet.  

Pulmonary is following and awaiting cardiology consult.  Patient underwent an 

echo which shows overall left ventricular systolic function is low to normal 

with an EF between 50 and 55% with mild mitral and tricuspid regurgitation 

present with no evidence of pulmonary hypertension.  Will likely transition 

Lasix and steroids to oral in the morning.





01/24/2020


Patient is sitting up in a chair and appears to be in no acute distress.  No 

Acute overnight issues.  Patient states that she continues to have shortness of 

breath and becomes extremely winded with any exertion.  Patient states that she 

is short of breath when walking to the bathroom and back.  Currently remains on 

IV Lasix and IV steroids and will continue at this time.  Possible transition to

oral medications tomorrow.  Pulmonary and cardiology are following.  Patient 

also states that she feels that she has phlegm in her throat but is not able to 

cough it up at this time.  Patient will also continue on bronchodilators at this

time.  Currently patient denies any chest pain or palpitations.  Patient is 

afebrile.  Patient denies any nausea or vomiting and has been tolerating diet.





Objective





- Vital Signs


Vital signs: 


                                   Vital Signs











Temp  98.4 F   01/24/20 03:30


 


Pulse  88   01/24/20 11:45


 


Resp  16   01/24/20 12:00


 


BP  123/62   01/24/20 08:00


 


Pulse Ox  92 L  01/24/20 08:00








                                 Intake & Output











 01/23/20 01/24/20 01/24/20





 18:59 06:59 18:59


 


Intake Total 720 720 600


 


Output Total 1600  


 


Balance -880 720 600


 


Weight 121.7 kg 122.2 kg 


 


Intake:   


 


  Oral 720 720 600


 


Output:   


 


  Urine 1600  


 


Other:   


 


  # Voids  1 3


 


  # Bowel Movements   2














- Exam





Patient is sitting up in the chair comfortably, no acute distress, awake alert 

and oriented eating lunch.. 


HEENT: Normocephalic. Neck is supple. Pupils reactive. Nostrils clear. Oral 

cavity is moist. Ears reveal no drainage. 


Neck reveals no JVD, carotid bruits, or thyromegaly. 


CHEST EXAMINATION: Trachea is central. Symmetrical expansion.  Bibasilar 

diminished sounds with no rhonchi or wheezing noted


CARDIAC: Normal S1, S2 with no gallops. No murmurs 


ABDOMEN: Soft. Bowel sounds normal. No organomegaly. No abdominal bruits. 


Extremities: Trace bilateral edema.  Slight improvement.  No clubbing or 

cyanosis.  No pitting edema noted bilateral lower extremities


Neurologically awake, alert, oriented x3 with well-coordinated movements.  No 

focal deficits noted


Skin: No rash or skin lesions. 


Psychiatric: Cooperative.  Non-suicidal


Musculoskeletal: No joint swelling or deformity.  Normal range of motion.











- Labs


CBC & Chem 7: 


                                 01/23/20 05:52





                                 01/23/20 05:52


Labs: 


                  Abnormal Lab Results - Last 24 Hours (Table)











  01/23/20 01/23/20 01/24/20 Range/Units





  17:09 20:39 06:07 


 


POC Glucose (mg/dL)  366 H  383 H  351 H  (75-99)  mg/dL


 


Hemoglobin A1c     (4.0-6.0)  %














  01/24/20 01/24/20 Range/Units





  07:08 12:02 


 


POC Glucose (mg/dL)   393 H  (75-99)  mg/dL


 


Hemoglobin A1c  8.5 H   (4.0-6.0)  %








                      Microbiology - Last 24 Hours (Table)











 01/22/20 11:01 Blood Culture - Preliminary





 Blood    No Growth after 48 hours














Assessment and Plan


Assessment: 





Acute hypoxic respiratory failure secondary to asthma exacerbation which is due 

to URI


Pulmonary vascular congestion.  Unlikely CHF with a BNP less than 11


Positive strep throat


History of DVT/PE currently on xarelto


Hypertension


Diabetes type 2 non-insulin-dependent


Hypothyroidism


Anxiety/depression


History of colon polyps


DVT prophylaxis patient is already on full anticoagulation





Plan:


Patient will be continued on bronchodilators, IV steroids, and IV Lasix at this 

time.  Will hold off on transitioning to oral Lasix and prednisone tomorrow as 

the patient continues to have shortness of breath.  Will continue to monitor 

labs and vital signs closely.  Will continue with sliding scale for coverage as 

her blood sugars expected to be elevated due to steroids and history of diabetes

mellitus type 2.  Continue with antibiotics in the form of ceftriaxone and 

azithromycin.  Continue with oxygen therapy as needed.  Pulmonary and cardiology

are following.  Further recommendations based on the clinical course.  Possible 

discharge in 24-48 hours.

## 2020-01-24 NOTE — P.PN
Subjective


Progress Note Date: 01/24/20


Principal diagnosis: 


 Shortness of breath and cough likely related to upper respiratory tract 

infection/acute bronchitis and mild fluid overload





 On 01/24/2020 patient seen in follow-up on selective care unit, she is sitting 

up in a recliner, in no acute distress, she states her breathing has improved 

since admission, he is on room air, with a pulse ox of 92%, lung sounds are c

lear diminished at the bases, no significant wheezing or congestion, no fever or

chills, hemodynamically patient is stable.  Patient is on a Zithromax, she is 

being diuresed, she is on Lasix 20 mg every 12 hours, Tessalon Perles, breathing

treatments and IV steroids, she is improving








Objective





- Vital Signs


Vital signs: 


                                   Vital Signs











Temp  98.4 F   01/24/20 03:30


 


Pulse  77   01/24/20 15:32


 


Resp  16   01/24/20 15:32


 


BP  123/62   01/24/20 08:00


 


Pulse Ox  92 L  01/24/20 15:32








                                 Intake & Output











 01/23/20 01/24/20 01/24/20





 18:59 06:59 18:59


 


Intake Total 720 720 600


 


Output Total 1600  


 


Balance -880 720 600


 


Weight 121.7 kg 122.2 kg 


 


Intake:   


 


  Oral 720 720 600


 


Output:   


 


  Urine 1600  


 


Other:   


 


  # Voids  1 3


 


  # Bowel Movements   2














- Exam


 GENERAL EXAM: Alert, very pleasant, 59-year-old morbidly obese white female on 

room air with a pulse ox of 92% comfortable in no apparent distress.


HEAD: Normocephalic/atraumatic.


EYES: Normal reaction of pupils, equal size.  Conjunctiva pink, sclera white.


NOSE: Clear with pink turbinates.


THROAT: No erythema or exudates.


NECK: No masses, no JVD, no thyroid enlargement, no adenopathy.


CHEST: No chest wall deformity.  Symmetrical expansion. 


LUNGS: Equal air entry with no crackles, wheeze, rhonchi or dullness.


CVS: Regular rate and rhythm, normal S1 and S2, no gallops, no murmurs, no rubs


ABDOMEN: Soft, nontender.  No hepatosplenomegaly, normal bowel sounds, no 

guarding or rigidity.


EXTREMITIES: No clubbing, no edema, no cyanosis, 2+ pulses and upper and lower 

extremities.


MUSCULOSKELETAL: Muscle strength and tone normal.


SPINE: No scoliosis or deformity


SKIN: No rashes


CENTRAL NERVOUS SYSTEM: Alert and oriented -3.  No focal deficits, tone is 

normal in all 4 extremities.


PSYCHIATRIC: Alert and oriented -3.  Appropriate affect.  Intact judgment and 

insight.











- Labs


CBC & Chem 7: 


                                 01/23/20 05:52





                                 01/23/20 05:52


Labs: 


                  Abnormal Lab Results - Last 24 Hours (Table)











  01/23/20 01/23/20 01/24/20 Range/Units





  17:09 20:39 06:07 


 


POC Glucose (mg/dL)  366 H  383 H  351 H  (75-99)  mg/dL


 


Hemoglobin A1c     (4.0-6.0)  %














  01/24/20 01/24/20 Range/Units





  07:08 12:02 


 


POC Glucose (mg/dL)   393 H  (75-99)  mg/dL


 


Hemoglobin A1c  8.5 H   (4.0-6.0)  %








                      Microbiology - Last 24 Hours (Table)











 01/22/20 11:01 Blood Culture - Preliminary





 Blood    No Growth after 48 hours














Assessment and Plan


Plan: 


 Assessment:





#1.  Dyspnea, likely related to acute upper respiratory tract infection/acute 

bronchitis with bronchial inflammation and possibility of mild fluid overload





#2.  History of recent cardiac catheterization showing normal coronary arteries





#3.  History of thyroid cancer status post thyroidectomy





#4.  Prior history of PE and DVT in 2015 on anticoagulation 





#5.  Hypercoagulable state





#6.  History of mild asthma, not currently being treated and not active





#7.  Angina pectoris





#8.  Diabetes mellitus type 2





#9.  GERD/reflux





#10.  Hypertension





#11.  History of pneumonia





#12.  History of colonic polyps





Plan:





Continue IV steroids, continue antibiotics, updrafts and Tessalon Perles, 

patient is improving, she is being diuresed, she is maintaining negative fluid 

balance.  She is on room air.  She is improving, could be considered for 

discharge home today or tomorrow





I performed a history & physical examination of the patient and discussed their 

management with my nurse practitioner, Maureen Simpson.  I reviewed the nurse 

practitioner's note and agree with the documented findings and plan of care.  

Lung sounds are positive for diminished breath sounds.  The findings and the 

impression was discussed with the patient.  I attest to the documentation by the

nurse practitioner. 





Time with Patient: Less than 30

## 2020-01-25 VITALS
RESPIRATION RATE: 16 BRPM | TEMPERATURE: 97.5 F | DIASTOLIC BLOOD PRESSURE: 82 MMHG | HEART RATE: 104 BPM | SYSTOLIC BLOOD PRESSURE: 121 MMHG

## 2020-01-25 LAB
ANION GAP SERPL CALC-SCNC: 9 MMOL/L
BASOPHILS # BLD AUTO: 0.1 K/UL (ref 0–0.2)
BASOPHILS NFR BLD AUTO: 1 %
BUN SERPL-SCNC: 20 MG/DL (ref 7–17)
CALCIUM SPEC-MCNC: 9.4 MG/DL (ref 8.4–10.2)
CHLORIDE SERPL-SCNC: 96 MMOL/L (ref 98–107)
CO2 SERPL-SCNC: 34 MMOL/L (ref 22–30)
EOSINOPHIL # BLD AUTO: 0 K/UL (ref 0–0.7)
EOSINOPHIL NFR BLD AUTO: 0 %
ERYTHROCYTE [DISTWIDTH] IN BLOOD BY AUTOMATED COUNT: 4.37 M/UL (ref 3.8–5.4)
ERYTHROCYTE [DISTWIDTH] IN BLOOD: 18.2 % (ref 11.5–15.5)
GLUCOSE BLD-MCNC: 356 MG/DL (ref 75–99)
GLUCOSE BLD-MCNC: 396 MG/DL (ref 75–99)
GLUCOSE BLD-MCNC: 426 MG/DL (ref 75–99)
GLUCOSE SERPL-MCNC: 389 MG/DL (ref 74–99)
HCT VFR BLD AUTO: 46.3 % (ref 34–46)
HGB BLD-MCNC: 14.2 GM/DL (ref 11.4–16)
LYMPHOCYTES # SPEC AUTO: 1.4 K/UL (ref 1–4.8)
LYMPHOCYTES NFR SPEC AUTO: 13 %
MCH RBC QN AUTO: 32.6 PG (ref 25–35)
MCHC RBC AUTO-ENTMCNC: 30.8 G/DL (ref 31–37)
MCV RBC AUTO: 105.9 FL (ref 80–100)
MONOCYTES # BLD AUTO: 0.5 K/UL (ref 0–1)
MONOCYTES NFR BLD AUTO: 5 %
NEUTROPHILS # BLD AUTO: 9.2 K/UL (ref 1.3–7.7)
NEUTROPHILS NFR BLD AUTO: 80 %
PLATELET # BLD AUTO: 381 K/UL (ref 150–450)
POTASSIUM SERPL-SCNC: 4.4 MMOL/L (ref 3.5–5.1)
SODIUM SERPL-SCNC: 139 MMOL/L (ref 137–145)
WBC # BLD AUTO: 11.5 K/UL (ref 3.8–10.6)

## 2020-01-25 RX ADMIN — IPRATROPIUM BROMIDE AND ALBUTEROL SULFATE SCH ML: .5; 3 SOLUTION RESPIRATORY (INHALATION) at 12:37

## 2020-01-25 RX ADMIN — BENZONATATE SCH MG: 100 CAPSULE ORAL at 07:32

## 2020-01-25 RX ADMIN — METHYLPREDNISOLONE SODIUM SUCCINATE SCH MG: 40 INJECTION, POWDER, FOR SOLUTION INTRAMUSCULAR; INTRAVENOUS at 07:31

## 2020-01-25 RX ADMIN — LEVOTHYROXINE SODIUM SCH MCG: 75 TABLET ORAL at 06:09

## 2020-01-25 RX ADMIN — AZITHROMYCIN SCH MG: 500 TABLET, FILM COATED ORAL at 09:13

## 2020-01-25 RX ADMIN — BENZONATATE SCH MG: 100 CAPSULE ORAL at 17:10

## 2020-01-25 RX ADMIN — INSULIN ASPART SCH UNIT: 100 INJECTION, SOLUTION INTRAVENOUS; SUBCUTANEOUS at 07:32

## 2020-01-25 RX ADMIN — IPRATROPIUM BROMIDE AND ALBUTEROL SULFATE SCH ML: .5; 3 SOLUTION RESPIRATORY (INHALATION) at 09:13

## 2020-01-25 RX ADMIN — FUROSEMIDE SCH MG: 10 INJECTION, SOLUTION INTRAMUSCULAR; INTRAVENOUS at 07:32

## 2020-01-25 RX ADMIN — IPRATROPIUM BROMIDE AND ALBUTEROL SULFATE SCH: .5; 3 SOLUTION RESPIRATORY (INHALATION) at 16:25

## 2020-01-25 RX ADMIN — INSULIN ASPART SCH UNIT: 100 INJECTION, SOLUTION INTRAVENOUS; SUBCUTANEOUS at 17:08

## 2020-01-25 RX ADMIN — INSULIN ASPART SCH UNIT: 100 INJECTION, SOLUTION INTRAVENOUS; SUBCUTANEOUS at 12:06

## 2020-01-25 NOTE — P.PN
Subjective


Progress Note Date: 01/25/20


Principal diagnosis: 





Dyspnea secondary to upper respiratory tract infection/bronchitis with mild 

fluid volume overload





The patient is seen today 01/25/2020 in follow-up on the regular medical floor. 

She is currently awake and alert in no acute distress.  Resting quite comfortab

ly in bed.  No worsening shortness of breath, cough or congestion.  Maintaining 

O2 saturation in the 90s on room air.  She's been afebrile.  Hemodynamically 

stable.  Blood culture reveals no growth.  White count 11.5.  Hemoglobin 14.2.  

Creatinine 0.57.  She's been maintained on bronchodilators, Tessalon Perles, 

Mucinex.  Oral prednisone.  She remains on IV diuretics. Antibiotics in the form

of azithromycin.  Anticoagulated with Xarelto.





Objective





- Vital Signs


Vital signs: 


                                   Vital Signs











Temp  97.6 F   01/25/20 09:17


 


Pulse  76   01/25/20 09:25


 


Resp  15   01/25/20 09:17


 


BP  130/83   01/25/20 09:17


 


Pulse Ox  90 L  01/25/20 09:17








                                 Intake & Output











 01/24/20 01/25/20 01/25/20





 18:59 06:59 18:59


 


Intake Total 840 880 236


 


Balance 840 880 236


 


Intake:   


 


  Oral 840 880 236


 


Other:   


 


  # Voids 3 3 


 


  # Bowel Movements 2 2 














- Exam





GENERAL EXAM: Alert, very pleasant, 59-year-old morbidly obese white female on 

room air  comfortable in no apparent distress.


HEAD: Normocephalic/atraumatic.


EYES: Normal reaction of pupils, equal size.  Conjunctiva pink, sclera white.


NOSE: Clear with pink turbinates.


THROAT: No erythema or exudates.


NECK: No masses, no JVD, no thyroid enlargement, no adenopathy.


CHEST: No chest wall deformity.  Symmetrical expansion. 


LUNGS: Equal air entry with no crackles, wheeze, rhonchi or dullness.


CVS: Regular rate and rhythm, normal S1 and S2, no gallops, no murmurs, no rubs


ABDOMEN: Soft, nontender.  No hepatosplenomegaly, normal bowel sounds, no 

guarding or rigidity.


EXTREMITIES: No clubbing, no edema, no cyanosis, 2+ pulses and upper and lower 

extremities.


MUSCULOSKELETAL: Muscle strength and tone normal.


SPINE: No scoliosis or deformity


SKIN: No rashes


CENTRAL NERVOUS SYSTEM: No focal deficits, tone is normal in all 4 extremities.


PSYCHIATRIC: Alert and oriented -3.  Appropriate affect.  Intact judgment and 

insight.














- Labs


CBC & Chem 7: 


                                 01/25/20 06:47





                                 01/25/20 06:47


Labs: 


                  Abnormal Lab Results - Last 24 Hours (Table)











  01/24/20 01/24/20 01/24/20 Range/Units





  07:08 16:55 21:25 


 


WBC     (3.8-10.6)  k/uL


 


Hct     (34.0-46.0)  %


 


MCV     (80.0-100.0)  fL


 


MCHC     (31.0-37.0)  g/dL


 


RDW     (11.5-15.5)  %


 


Neutrophils #     (1.3-7.7)  k/uL


 


Macrocytosis     


 


Chloride     ()  mmol/L


 


Carbon Dioxide     (22-30)  mmol/L


 


BUN     (7-17)  mg/dL


 


Glucose     (74-99)  mg/dL


 


POC Glucose (mg/dL)   338 H  376 H  (75-99)  mg/dL


 


Hemoglobin A1c  8.5 H    (4.0-6.0)  %














  01/25/20 01/25/20 01/25/20 Range/Units





  06:47 06:47 06:59 


 


WBC  11.5 H    (3.8-10.6)  k/uL


 


Hct  46.3 H    (34.0-46.0)  %


 


MCV  105.9 H    (80.0-100.0)  fL


 


MCHC  30.8 L    (31.0-37.0)  g/dL


 


RDW  18.2 H    (11.5-15.5)  %


 


Neutrophils #  9.2 H    (1.3-7.7)  k/uL


 


Macrocytosis  Marked A    


 


Chloride   96 L   ()  mmol/L


 


Carbon Dioxide   34 H   (22-30)  mmol/L


 


BUN   20 H   (7-17)  mg/dL


 


Glucose   389 H   (74-99)  mg/dL


 


POC Glucose (mg/dL)    356 H  (75-99)  mg/dL


 


Hemoglobin A1c     (4.0-6.0)  %














  01/25/20 Range/Units





  11:30 


 


WBC   (3.8-10.6)  k/uL


 


Hct   (34.0-46.0)  %


 


MCV   (80.0-100.0)  fL


 


MCHC   (31.0-37.0)  g/dL


 


RDW   (11.5-15.5)  %


 


Neutrophils #   (1.3-7.7)  k/uL


 


Macrocytosis   


 


Chloride   ()  mmol/L


 


Carbon Dioxide   (22-30)  mmol/L


 


BUN   (7-17)  mg/dL


 


Glucose   (74-99)  mg/dL


 


POC Glucose (mg/dL)  426 H  (75-99)  mg/dL


 


Hemoglobin A1c   (4.0-6.0)  %








                      Microbiology - Last 24 Hours (Table)











 01/22/20 11:01 Blood Culture - Preliminary





 Blood    No Growth after 48 hours














Assessment and Plan


Assessment: 





#1.  Dyspnea, likely related to acute upper respiratory tract infection/acute 

bronchitis with bronchial inflammation and possibility of mild fluid overload





#2.  History of recent cardiac catheterization showing normal coronary arteries





#3.  History of thyroid cancer status post thyroidectomy





#4.  Prior history of PE and DVT in 2015 on anticoagulation 





#5.  Hypercoagulable state





#6.  History of mild asthma, not currently being treated and not active





#7.  Angina pectoris





#8.  Diabetes mellitus type 2





#9.  GERD/reflux





#10.  Hypertension





#11.  History of pneumonia





#12.  History of colonic polyps





Plan:





The patient was seen and evaluated by Dr. Suggs.  She is cleared for discharge 

from the pulmonary standpoint.  Complete a prednisone taper.  Complete a course 

of antibiotics.





I, the cosigning physician, performed a history & physical examination of the 

patient. Lungs sounds are clear.  Maintaining good O2 saturations in the 90s on 

room air.  I discussed the assessment and plan of care with my nurse 

practitioner, Dottie Rodríguez. I attest to the above note as dictated by her.

## 2020-02-04 NOTE — P.DS
Providers


Date of admission: 


01/24/20 09:03





Expected date of discharge: 01/25/20


Attending physician: 


Fabio Altamirano





Consults: 





                                        





01/22/20 12:50


Consult Physician Routine 


   Consulting Provider: Nelson Monsalve


   Consult Reason/Comments: vascular congestion, SOB


   Do you want consulting provider notified?: Yes, Notify in am





01/22/20 12:57


Consult Physician Routine 


   Consulting Provider: Eva Westfall


   Consult Reason/Comments: pulmonary fibrosis patient, SOB


   Do you want consulting provider notified?: Yes, Notify in am











Primary care physician: 


ARCELIA العراقي





Hospital Course: 





Discharge Diagnosis


Acute hypoxic respiratory failure secondary to asthma exacerbation which is due 

to URI


Pulmonary vascular congestion.  Unlikely CHF with a BNP less than 11


Positive strep throat


History of DVT/PE currently on xarelto


Hypertension


Diabetes type 2 non-insulin-dependent


Hypothyroidism


Anxiety/depression


History of colon polyps


DVT prophylaxis patient is already on full anticoagulation





Hospital course.


Patient is a 59-year-old female with a known history of asthma, diabetes type 2,

history of DVT/PE currently on xarelto, GERD, hypertension and hypothyroidism 

was sent from Dr. العراقي's office with the complaints of dyspnea, tachycardia 

and hypoxia.  Patient has been having upper respiratory symptoms for the past 1 

week and patient developed heaviness in the chest with cough and minimal sputum 

production.  Denied any chest pain.  Patient has been having upper respiratory 

symptoms along with sore throat.  Patient had positive stress throat at PCPs 

office.  Patient was given antibiotics but patient was not able to  her 

medications by the time she come to the hospital.  Patient also noted to have 

increased leg swelling.  Denied any nausea vomiting.  No abdominal pain.  Patie

nt has been having diarrhea for 3 days now.  Diarrhea did improve otherwise.


Patient denied any fever but does have subjective chills.


Patient states that she had cardiac workup done in July 2019 including cardiac 

catheterization which was nonobstructive and no history of stent placement.


Chest x-ray showed correlate for CHF exacerbation as there is cardiomegaly and 

mild vascular congestion.


BNP less than 11 otherwise.  Patient was started on IV diuresis with Lasix 20 mg

every 12.


Patient was given a dose of Zosyn and azithromycin in the ER.


CT angiogram showed suboptimal study.  No evidence of pulmonary embolism.  No 

focal infiltrate was noted.  Vascular congestion.


Troponin 1 negative


WBC 11.7


Lactic acid 2.4


D-dimer not elevated.  Influenza negative.





01/23/2020


Patient is sitting up in the chair and appears to be in no acute distress.  

Patient states that her breathing has slightly improved but continues to have 

some wheezing.  Patient's blood sugars have been slightly elevated and is being 

covered with sliding scale.  Patient is currently maintained on IV Lasix along 

with IV steroids and will continue at this time.  Currently patient denies any 

chest pain, worsening shortness of breath, or palpitations.  Patient has been 

afebrile.  Patient denies any nausea or vomiting and has been tolerating diet.  

Pulmonary is following and awaiting cardiology consult.  Patient underwent an 

echo which shows overall left ventricular systolic function is low to normal 

with an EF between 50 and 55% with mild mitral and tricuspid regurgitation 

present with no evidence of pulmonary hypertension.  Will likely transition 

Lasix and steroids to oral in the morning.





01/24/2020


Patient is sitting up in a chair and appears to be in no acute distress.  No 

Acute overnight issues.  Patient states that she continues to have shortness of 

breath and becomes extremely winded with any exertion.  Patient states that she 

is short of breath when walking to the bathroom and back.  Currently remains on 

IV Lasix and IV steroids and will continue at this time.  Possible transition to

oral medications tomorrow.  Pulmonary and cardiology are following.  Patient 

also states that she feels that she has phlegm in her throat but is not able to 

cough it up at this time.  Patient will also continue on bronchodilators at this

time.  Currently patient denies any chest pain or palpitations.  Patient is 

afebrile.  Patient denies any nausea or vomiting and has been tolerating diet.





1/25/2020


Patient denied any complaints of chest pain or exertional short of breath today.

 Patient will be continued on antibiotics and tapering dose of steroids.  

Patient was seen by pulmonary and cleared for discharge.  No fever or chills.  

Patient states chest she feels better.  No nausea vomiting or abdominal pain.  

No diarrhea.  No other acute overnight issues.





Patient is sitting up in the chair comfortably, no acute distress, awake alert 

and oriented eating lunch.. 


HEENT: Normocephalic. Neck is supple. Pupils reactive. Nostrils clear. Oral 

cavity is moist. Ears reveal no drainage. 


Neck reveals no JVD, carotid bruits, or thyromegaly. 


CHEST EXAMINATION: Trachea is central. Symmetrical expansion. air entry 

improved,. no rhonchi or wheezing noted


CARDIAC: Normal S1, S2 with no gallops. No murmurs 


ABDOMEN: Soft. Bowel sounds normal. No organomegaly. No abdominal bruits. 


Extremities: Trace bilateral edema.  Slight improvement.  No clubbing or 

cyanosis.  No pitting edema noted bilateral lower extremities


Neurologically awake, alert, oriented x3 with well-coordinated movements.  No 

focal deficits noted


Skin: No rash or skin lesions. 


Psychiatric: Cooperative.  Non-suicidal


Musculoskeletal: No joint swelling or deformity.  Normal range of motion.








                                   Vital Signs











Temp  97.6 F   01/25/20 09:17


 


Pulse  76   01/25/20 09:25


 


Resp  15   01/25/20 09:17


 


BP  130/83   01/25/20 09:17


 


Pulse Ox  90 L  01/25/20 09:17








                                 Intake & Output











 01/24/20 01/25/20 01/25/20





 18:59 06:59 18:59


 


Intake Total 840 880 236


 


Balance 840 880 236


 


Intake:   


 


  Oral 840 880 236


 


Other:   


 


  # Voids 3 3 


 


  # Bowel Movements 2 2 





time taken >35 min


Patient Condition at Discharge: Stable





Plan - Discharge Summary


New Discharge Prescriptions: 


New


   Azithromycin [Zithromax] 500 mg PO DAILY #2 tab


   Insulin Aspart Scale Rx Form [NovoLOG Outpatient Scale Rx Form] See Protocol 

MISCELLANE AS DIRECTED #1 each


   predniSONE See Taper PO AS DIRECTED #30 tab





Continue


   Furosemide [Lasix] 40 mg PO QAM


   Potassium Chloride [Klor-Con 10] 10 meq PO HS


   metFORMIN HCL 1,000 mg PO AC-BID


   rOPINIRole HCL [Requip] 4 mg PO HS


   traZODone HCL [Desyrel] 150 mg PO HS


   Levothyroxine Sodium [Synthroid] 150 mcg PO MOTUWETHFRSA


   Rivaroxaban [Xarelto] 20 mg PO HS


   traMADol HCL [Ultram] 50 mg PO HS


   Atorvastatin [Lipitor] 10 mg PO HS


   Pioglitazone [Actos] 30 mg PO DAILY


   Omeprazole 20 mg PO HS


   Cetirizine HCl 10 mg PO HS


   Levothyroxine Sodium [Synthroid] 225 mcg PO HAAS


Discharge Medication List





Furosemide [Lasix] 40 mg PO QAM 02/05/15 [History]


Potassium Chloride [Klor-Con 10] 10 meq PO HS 02/05/15 [History]


metFORMIN HCL 1,000 mg PO AC-BID 05/01/17 [History]


rOPINIRole HCL [Requip] 4 mg PO HS 05/03/17 [History]


Levothyroxine Sodium [Synthroid] 150 mcg PO MOTUWETHFRSA 04/13/18 [History]


Rivaroxaban [Xarelto] 20 mg PO HS 04/13/18 [History]


traZODone HCL [Desyrel] 150 mg PO HS 04/13/18 [History]


Atorvastatin [Lipitor] 10 mg PO HS 03/27/19 [History]


Omeprazole 20 mg PO HS 03/27/19 [History]


Pioglitazone [Actos] 30 mg PO DAILY 03/27/19 [History]


traMADol HCL [Ultram] 50 mg PO HS 03/27/19 [History]


Cetirizine HCl 10 mg PO HS 10/21/19 [History]


Levothyroxine Sodium [Synthroid] 225 mcg PO HAAS 01/22/20 [History]


Azithromycin [Zithromax] 500 mg PO DAILY #2 tab 01/25/20 [Rx]


Insulin Aspart Scale Rx Form [NovoLOG Outpatient Scale Rx Form] See Protocol 

MISCELLANE AS DIRECTED #1 each 01/25/20 [Rx]


predniSONE See Taper PO AS DIRECTED #30 tab 01/25/20 [Rx]








Follow up Appointment(s)/Referral(s): 


Prem Martinez MD [STAFF PHYSICIAN] - 1 Week


ARCELIA العراقي III, MD [Primary Care Provider] - 1-2 days


Joseph Suggs DO [Doctor of Osteopathic Medicine] - 1 Week


Discharge Disposition: HOME SELF-CARE

## 2020-02-26 ENCOUNTER — HOSPITAL ENCOUNTER (OUTPATIENT)
Dept: HOSPITAL 47 - BARWHC3 | Age: 60
Discharge: HOME | End: 2020-02-26
Payer: MEDICARE

## 2020-02-26 VITALS
RESPIRATION RATE: 16 BRPM | DIASTOLIC BLOOD PRESSURE: 78 MMHG | TEMPERATURE: 97.7 F | SYSTOLIC BLOOD PRESSURE: 122 MMHG | HEART RATE: 109 BPM

## 2020-02-26 VITALS — BODY MASS INDEX: 46 KG/M2

## 2020-02-26 DIAGNOSIS — Z53.29: Primary | ICD-10-CM

## 2020-02-26 PROCEDURE — 99211 OFF/OP EST MAY X REQ PHY/QHP: CPT

## 2020-02-26 NOTE — P.PN
Progress Note - Text


Progress Note Date: 02/26/20





Patient not seen due to cancellation from emergency surgery

## 2020-03-02 ENCOUNTER — HOSPITAL ENCOUNTER (OUTPATIENT)
Dept: HOSPITAL 47 - BARWHC3 | Age: 60
Discharge: HOME | End: 2020-03-02
Payer: MEDICARE

## 2020-03-02 VITALS — BODY MASS INDEX: 46.3 KG/M2

## 2020-03-02 DIAGNOSIS — E11.65: ICD-10-CM

## 2020-03-02 DIAGNOSIS — Z48.815: Primary | ICD-10-CM

## 2020-03-02 PROCEDURE — 97804 MEDICAL NUTRITION GROUP: CPT

## 2020-03-02 PROCEDURE — 99211 OFF/OP EST MAY X REQ PHY/QHP: CPT

## 2020-03-17 ENCOUNTER — HOSPITAL ENCOUNTER (OUTPATIENT)
Dept: HOSPITAL 47 - RADUSWWP | Age: 60
Discharge: HOME | End: 2020-03-17
Attending: FAMILY MEDICINE
Payer: MEDICARE

## 2020-03-17 DIAGNOSIS — R59.9: Primary | ICD-10-CM

## 2020-11-18 NOTE — CDI
Documentation Clarification Form



Date: 01/24/2020 11:41:32 AM

From: Elisabet KauffmanHamptonMARIA ELENA montes, CCDS

Phone:  [phone]

MRN: [id number]

Admit Date: 01/24/2020 09:03:00 AM

Patient Name: Aga Holbrook

Visit Number: [id number]

Discharge Date:  





ATTENTION: The Clinical Documentation Specialists (CDI) and [facility] Coding Staff 
appreciate your assistance in clarifying documentation. Please respond to the 
clarification below the line at the bottom and electronically sign. The CDI & 
[facility] Coding staff will review the response and follow-up if needed. Please note: 
Queries are made part of the Legal Health Record. If you have any questions, 
please contact the author of this message via ITS.



Dr. Fabio Altamirano:



Conflicting documentation has been found in the medical record:



Per the pulmonary consult: "History of mild asthma, not currently being treated 
and not active."

Per the attending 1/23 progress note: "Acute hypoxic respiratory failure 
secondary to asthma exacerbation which is due to URI." 



History/Risk Factors: Bronchial asthma, nos; Hypertension, DVT, PE, DM II, 
Hypothyroid, Thyroid Cancer status post thyroidectomy. 



Clinical Indicators: Presented with upper respiratory signs & symptoms: SOB, 
tachycardia, hypoxia, cough, chills, chest heaviness & sore throat. Diagnosed 
with strep throat by PCP. Has increased leg swelling & diarrhea x3 days. 

Treatment: IV Solumedrol, INH Albuterol, IV abx: Zosyn, Azithromycin, IV Lasix, 
O2 2Lnc, IV fluid boluses x2.



In your opinion, what is the most clinically appropriate diagnosis for this 
patient?  



    Asthma with acute exacerbation, please specify type: 

      o Mild intermittent

      o Mild, moderate or severe persistent

    Asthma with acute exacerbation ruled out

    Other explanation of clinical findings

    Unable to determine (no explanation for clinical findings)





(Last Revision: April 2018)

___________________________________________________________________________



Asthma with acute exacerbation: 

      o Mild intermittent

MTDD
Documentation Clarification Form



Date: 01/24/2020 11:52:39 AM

From: Elisabet Hampton CCS, CCDS

Phone:  [phone]

MRN: [id number]

Admit Date: 01/24/2020 09:03:00 AM

Patient Name: Aga Holbrook

Visit Number: [id number]

Discharge Date:  



ATTENTION: The Clinical Documentation Specialists (CDI) and [facility] Coding Staff 
appreciate your assistance in clarifying documentation. Please respond to the 
clarification below the line at the bottom and electronically sign. The CDI & 
[facility] Coding staff will review the response and follow-up if needed. Please note: 
Queries are made part of the Legal Health Record. If you have any questions, 
please contact the author of this message via ITS.



Dr. Fabio Altamirano:



Per the attending 1/23 progress note: "Acute hypoxic respiratory failure 
secondary to asthma exacerbation which is due to URI. Pulmonary vascular 
congestion, unlikely CHF with BNP <11. Positive strep throat."



History/Risk Factors: Bronchial asthma, nos; Hypertension, DVT, PE, DM II, 
Hypothyroid, Thyroid Cancer status post thyroidectomy. 

Clinical Indicators: Presented with upper respiratory signs & symptoms: SOB, 
tachycardia, hypoxia, cough, chills, chest heaviness & sore throat. Diagnosed 
with strep throat by PCP. Has increased leg swelling & diarrhea x3 days.

VS 1/22: T 98.0, P ^, R 18 (sob), /82, PO 95 RA - 94 2Lnc.  1/23: T 
97.5*, P 104 (cough, SOB), /73, PO 92 RA.  1/24: T 98.4, P 87 - 112^, PO 
92 RA.

LAB 1/22: WBC 11.7^, Lactic Acid 2.4^^;  1/23: WBC 10.8^, Neut 9.1^

1/22 CXR: Correlate for CHF exacerbation w/cardiomegaly & mild central vascular 
congestion. 1/22 CT Chest: Smaller segmental & subsegmental PE is not excluded, 
suspect CHF exacerbation on CXR, no infiltrate or pleural effusion.



Treatment: IV Solumedrol, INH Albuterol, IV abx: Zosyn, Azithromycin, IV Lasix, 
O2 2Lnc, IV fluid boluses x2.



In your professional opinion, please clarify if these findings signify one of 
the following conditions, whether the condition is POA, and cause, if known:



    Sepsis ruled out

    Sepsis ruled in: 

      o Due to: ______________________

      o Severe Sepsis, please specify associated organ failure or other 
condition: _____________

    Other, please specify  _____________

    Unable to determine



   Present on Admission:  Yes  or   No





(Last Revision: April 2018)

___________________________________________________________________________

Sepsis ruled out

MTDD
,

## 2020-11-27 ENCOUNTER — HOSPITAL ENCOUNTER (OUTPATIENT)
Dept: HOSPITAL 47 - RADMAMWWP | Age: 60
Discharge: HOME | End: 2020-11-27
Attending: SURGERY
Payer: MEDICARE

## 2020-11-27 DIAGNOSIS — R92.8: Primary | ICD-10-CM

## 2020-11-27 PROCEDURE — 77066 DX MAMMO INCL CAD BI: CPT

## 2020-11-30 NOTE — USB
Reason for exam: additional evaluation requested from abnormal screening.



History:

Patient is postmenopausal and has history of other cancer at age 51.

Family history of breast cancer in sister at age 66.

Benign excisional biopsy, October 7, 1997.

Taking other hormone.



US Breast Limited RT

Right limited breast ultrasound including focal area of concern, retroareolar and 

axilla demonstrates a 0.4 x 0.3 x 0.4cm mixed lesion at 11 o'clock.



These results were verbally communicated with the patient and result sheet given 

to the patient on 11/27/20.





ASSESSMENT: Suspicious, BI-RAD 4



RECOMMENDATION:

Ultrasound core biopsy of the right breast.



Called office with mammographic findings and has scheduled an appointment for the 

patient for 12/10/20 at 10:45 with Dr. Zamudio.

Biopsy scheduled for 12/4/20 at 10:30.



PRELIMINARY REPORT CALLED AND FAXED TO DR. ZAMUDIO ON 11/30/20.

## 2020-11-30 NOTE — MM
Reason for exam: additional evaluation requested from prior study.

Last mammogram was performed 1 year and 7 months ago.



History:

Patient is postmenopausal and has history of other cancer at age 51.

Family history of breast cancer in sister at age 66.

Benign excisional biopsy, October 7, 1997.

Taking other hormone.



Physical Findings:

Nurse did not find any significant physical abnormalities on exam.



MG Diagnostic Mammo w CAD ELEAN

Bilateral CC and MLO view(s) were taken.  LM and CC with magnification view(s) 

were taken of the right breast.  XCCL view(s) were taken of the left breast.

Prior study comparison: April 30, 2019, bilateral MG diagnostic mammo w CAD ELENA.  

January 15, 2018, bilateral MG diagnostic mammo w CAD ELENA.

The breast tissue is almost entirely fat.

Finding: There is a 4 mm circumscribed oval mass located 4 cm from the nipple in 

the upper outer quadrant, middle position of the right breast.

New finding since April 30, 2019 and January 15, 2018.



These results were verbally communicated with the patient and result sheet given 

to the patient on 11/27/20.





ASSESSMENT: Incomplete: need additional imaging evaluation, BI-RAD 0



RECOMMENDATION:

Ultrasound of the right breast.

## 2020-12-04 ENCOUNTER — HOSPITAL ENCOUNTER (OUTPATIENT)
Dept: HOSPITAL 47 - RADUSWWP | Age: 60
End: 2020-12-04
Attending: SURGERY
Payer: COMMERCIAL

## 2020-12-04 VITALS
TEMPERATURE: 98.2 F | DIASTOLIC BLOOD PRESSURE: 76 MMHG | SYSTOLIC BLOOD PRESSURE: 111 MMHG | RESPIRATION RATE: 16 BRPM | HEART RATE: 93 BPM

## 2020-12-04 DIAGNOSIS — N60.11: Primary | ICD-10-CM

## 2020-12-04 DIAGNOSIS — N60.21: ICD-10-CM

## 2020-12-04 DIAGNOSIS — N60.81: ICD-10-CM

## 2020-12-04 PROCEDURE — 88305 TISSUE EXAM BY PATHOLOGIST: CPT

## 2020-12-04 PROCEDURE — 19083 BX BREAST 1ST LESION US IMAG: CPT

## 2020-12-04 PROCEDURE — 77065 DX MAMMO INCL CAD UNI: CPT

## 2020-12-04 NOTE — USB
EXAMINATION TYPE: US biopsy breast VAD RT, MG diagnostic mammo RT wo CAD

 

DATE OF EXAM: 12/4/2020

 

CLINICAL HISTORY: R92.8 Abnormal Mammo. Abnormal ultrasound.

 

TECHNIQUE: Ultrasound guided core biopsy of right breast with clip placement and
follow-up diagnostic two-view mammogram.  

 

COMPARISON: Prior ultrasound and mammogram November 27, 2020.

 

FINDINGS: The procedure of ultrasound guided core biopsy was explained to the 
patient.  Benefits, alternatives, and risks were discussed.  An informed consent
was then obtained.  

 

The patient was placed in supine positioning for  imaging and for the procedure.
Preprocedure ultrasound redemonstrates vague 4 x 2 mm lesion left o'clock 
position zone A deep in the right breast and slightly larger more superficial 
slightly hypoechoic lobulated 6 to 7 mm lesion on current study. Larger lesion 
chosen for sampling. Both fairly low suspicion. The overlying skin was prepped 
and draped in usual sterile fashion. Lidocaine is used as anesthetic into the 
skin and subcutaneous tissue up to area of concern in the right breast.  

 

Under ultrasound guidance, a  vacuum assisted biopsy gun device was used to 
obtain 4 core samples.  Following this, a biopsy clip was left in lesion.  
Lesion not well seen after sampling.

 

The patient tolerated the procedure well without any immediate complication.  
The patient was kept in the radiology department for short stay after the 
procedure and then discharged home in stable condition.  

 

Postprocedure mammogram shows clip deployment more anterior to the area of 
concern on original mammogram workup which is less well seen after sampling.

 

IMPRESSION: Successful, uncomplicated ultrasound guided core biopsy of largest 
ultrasound area of concern in the right breast, full pathology results to 
follow. 

 

Low index of suspicion noted at time of procedure.

 

Lesion does NOT correspond to area of mammogram concern.

 

Pathology Results: Benign



RIGHT BREAST, ULTRASOUND GUIDED CORE BIOPSY: Fibrocystic changes including cysts
with intraluminal calcium oxalate crystals, fibrosis, apocrine metaplasia, 
sclerosing adenosis and moderate usual type ductal hyperplasia. 



Recommendation

Follow up mammogram of the right breast in 6 months.

KAILYN

## 2021-03-08 ENCOUNTER — HOSPITAL ENCOUNTER (OUTPATIENT)
Dept: HOSPITAL 47 - RADMAMWWP | Age: 61
Discharge: HOME | End: 2021-03-08
Attending: SURGERY
Payer: MEDICARE

## 2021-03-08 DIAGNOSIS — R92.8: Primary | ICD-10-CM

## 2021-03-08 PROCEDURE — 77065 DX MAMMO INCL CAD UNI: CPT

## 2021-03-09 NOTE — MM
Reason for exam: follow-up at short interval from prior study.

Last mammogram was performed 3 months ago.



History:

Patient is postmenopausal and has history of other cancer at age 51.

Family history of breast cancer in sister at age 66.

Benign US biopsy breast VAD RT of the right breast, December 4, 2020.  Benign 

excisional biopsy, October 7, 1997.

Taking other hormone.



Physical Findings:

Nurse did not find any significant physical abnormalities on exam.



MG Diagnostic Mammo RT w CAD

CC, MLO, CC with magnification, LM with magnification, and LM view(s) were taken 

of the right breast.

Prior study comparison: December 4, 2020, right breast MG diagnostic mammo RT wo 

CAD.  November 27, 2020, bilateral MG diagnostic mammo w CAD ELENA.  April 30, 2019,

bilateral MG diagnostic mammo w CAD ELENA.  January 15, 2018, bilateral MG 

diagnostic mammo w CAD ELENA.

There are scattered fibroglandular densities.  Previous mammotome biopsy in the 

right breast. 11 o'clock anterior nodule increased slightly from 3.5mm now at 5mm.

Associated calcifications are similar on the magnification view. Stereotactic 

biopsy can be performed on the previous intended biopsy target.



These results were verbally communicated with the patient and result sheet given 

to the patient on 3/8/21.





ASSESSMENT: Suspicious, BI-RAD 4



RECOMMENDATION:

Stereotactic core biopsy of the right breast.



Called office with mammographic findings and has scheduled an appointment for the 

patient for 4/21/21 at 4:30 with Dr. Zamudio.

Biopsy scheduled for 3/29/21 at 8:00.



PRELIMINARY REPORT CALLED AND FAXED TO DR. ZAMUDIO ON 3/9/21.

## 2021-03-29 ENCOUNTER — HOSPITAL ENCOUNTER (OUTPATIENT)
Dept: HOSPITAL 47 - RADMAMWWP | Age: 61
End: 2021-03-29
Attending: SURGERY
Payer: MEDICARE

## 2021-03-29 VITALS — RESPIRATION RATE: 16 BRPM

## 2021-03-29 VITALS — DIASTOLIC BLOOD PRESSURE: 70 MMHG | TEMPERATURE: 98 F | HEART RATE: 84 BPM | SYSTOLIC BLOOD PRESSURE: 107 MMHG

## 2021-03-29 DIAGNOSIS — R92.0: ICD-10-CM

## 2021-03-29 DIAGNOSIS — N62: ICD-10-CM

## 2021-03-29 DIAGNOSIS — R92.8: ICD-10-CM

## 2021-03-29 DIAGNOSIS — N60.81: ICD-10-CM

## 2021-03-29 DIAGNOSIS — N64.89: ICD-10-CM

## 2021-03-29 DIAGNOSIS — N60.11: Primary | ICD-10-CM

## 2021-03-29 PROCEDURE — 88305 TISSUE EXAM BY PATHOLOGIST: CPT

## 2021-03-29 PROCEDURE — 19081 BX BREAST 1ST LESION STRTCTC: CPT

## 2021-03-29 NOTE — MM
EXAMINATION TYPE: MG stereo VAD BX RT

 

DATE OF EXAM: 3/29/2021

 

COMPARISON: 3/8/2021

 

CLINICAL HISTORY: 60-year-old female referred for stereotactic core needle 
biopsy of a nodule with associated microcalcifications at the 11:00 position 
right breast. Recent ultrasound biopsy was discordant with the original 
mammographic finding.

 

TECHNIQUE: Stereotactic guided core biopsy of the 11:00 right breast.  

 

FINDINGS: The procedure of stereotactic guided core biopsy was explained to the 
patient.  Benefits, alternatives, and risks were discussed.  An informed consent
was then obtained.  

 

The shortness pathway for biopsy was chosen.  Shortness pathway was a superior 
approach. I performed the localization followed by the remainder of the 
procedure. A vacuum assisted biopsy gun was used to obtain 7 core samples.   

 

The patient tolerated the procedure well without any immediate complication.  
The patient was kept in the radiology department for short stay after the 
procedure and then discharged home in stable condition.  Targeted calcifications
are identified in specimen mammogram.  Post biopsy mammogram shows small 
hematoma formation and the clip to have migrated slightly anteriorly relative to
the targeted area of concern on the preprocedure images.  

 

 

 

IMPRESSION: 

 

SUCCESSFUL, UNCOMPLICATED STEREOTACTIC GUIDED CORE BIOPSY OF THE NODULE WITH 
ASSOCIATED MICROCALCIFICATIONS IN THE 11:00 ANTERIOR RIGHT BREAST. NOTE SLIGHT 
ANTERIOR CLIP MIGRATION DUE TO SMALL HEMATOMA FORMATION. FULL PATHOLOGY RESULTS 
TO FOLLOW.  

 

Pathology Results: Benign



RIGHT BREAST, CORE BIOPSY: Fibrocystic change with apocrine metaplasia, 
microcalcification, columnar cell change and moderate to florid usual ductal 
hyperplasia. Negative for in situ or invasive carcinoma.  



Recommendation

Follow up mammogram of the right breast in 6 months.

KAILYN

## 2021-11-02 ENCOUNTER — HOSPITAL ENCOUNTER (OUTPATIENT)
Dept: HOSPITAL 47 - RADMAMWWP | Age: 61
Discharge: HOME | End: 2021-11-02
Attending: SURGERY
Payer: MEDICARE

## 2021-11-02 DIAGNOSIS — Z80.3: ICD-10-CM

## 2021-11-02 DIAGNOSIS — R92.2: Primary | ICD-10-CM

## 2021-11-02 DIAGNOSIS — Z85.89: ICD-10-CM

## 2021-11-02 PROCEDURE — 77062 BREAST TOMOSYNTHESIS BI: CPT

## 2021-11-02 PROCEDURE — 77066 DX MAMMO INCL CAD BI: CPT

## 2021-11-02 NOTE — MM
Reason for exam: follow-up at short interval from prior study.

Last mammogram was performed 8 months ago.



History:

Patient is postmenopausal and has history of other cancer at age 51.

Family history of breast cancer in sister at age 66.

Benign MG stereo VAD BX RT of the right breast, March 29, 2021.  Benign US biopsy 

breast VAD RT of the right breast, December 4, 2020.  Benign excisional biopsy, 

October 7, 1997.

Took hormonal contraceptives for 1 year beginning at age 23.  Taking other 

hormone.



Physical Findings:

Nurse did not find any significant physical abnormalities on exam.



MG 3D Diag Mammo W/Cad ELENA

Bilateral CC and MLO view(s) were taken.

Prior study comparison: March 8, 2021, right breast MG diagnostic mammo RT w CAD. 

December 4, 2020, right breast MG diagnostic mammo RT wo CAD.  November 27, 2020,

bilateral MG diagnostic mammo w CAD ELENA.

There are scattered fibroglandular densities.  Previous mammotome biopsy in the 

right breast.

No significant new findings when compared with previous films.



These results were verbally communicated with the patient and result sheet given 

to the patient on 11/2/21.





ASSESSMENT: Benign, BI-RAD 2



RECOMMENDATION:

Routine screening mammogram of both breasts in 1 year.

## 2022-06-19 ENCOUNTER — HOSPITAL ENCOUNTER (EMERGENCY)
Dept: HOSPITAL 47 - EC | Age: 62
Discharge: HOME | End: 2022-06-19
Payer: MEDICARE

## 2022-06-19 VITALS
HEART RATE: 110 BPM | DIASTOLIC BLOOD PRESSURE: 75 MMHG | SYSTOLIC BLOOD PRESSURE: 137 MMHG | RESPIRATION RATE: 20 BRPM | TEMPERATURE: 98.3 F

## 2022-06-19 DIAGNOSIS — Z88.8: ICD-10-CM

## 2022-06-19 DIAGNOSIS — Z91.040: ICD-10-CM

## 2022-06-19 DIAGNOSIS — J45.909: ICD-10-CM

## 2022-06-19 DIAGNOSIS — Z79.84: ICD-10-CM

## 2022-06-19 DIAGNOSIS — Z79.890: ICD-10-CM

## 2022-06-19 DIAGNOSIS — E78.5: ICD-10-CM

## 2022-06-19 DIAGNOSIS — K21.9: ICD-10-CM

## 2022-06-19 DIAGNOSIS — I82.532: Primary | ICD-10-CM

## 2022-06-19 DIAGNOSIS — Z79.899: ICD-10-CM

## 2022-06-19 DIAGNOSIS — E07.9: ICD-10-CM

## 2022-06-19 DIAGNOSIS — Z79.02: ICD-10-CM

## 2022-06-19 DIAGNOSIS — E11.9: ICD-10-CM

## 2022-06-19 PROCEDURE — 96372 THER/PROPH/DIAG INJ SC/IM: CPT

## 2022-06-19 PROCEDURE — 93971 EXTREMITY STUDY: CPT

## 2022-06-19 PROCEDURE — 99283 EMERGENCY DEPT VISIT LOW MDM: CPT

## 2022-06-19 NOTE — US
EXAMINATION TYPE: US venous doppler duplex LE LT

 

DATE OF EXAM: 6/19/2022 2:07 PM

 

COMPARISON: NONE

 

CLINICAL HISTORY: posterior knee pain, hx of DVT/PE. Pain behind left knee x 4 days. Hx of DVT/PE. Pt
 is on blood thinners.

 

SIDE PERFORMED: Left  

 

TECHNIQUE:  The lower extremity deep venous system is examined utilizing real time linear array sonog
seth with graded compression, doppler sonography and color-flow sonography.

 

VESSELS IMAGED:

Common Femoral Vein

Deep Femoral Vein

Greater Saphenous Vein *

Femoral Vein

Popliteal Vein

Small Saphenous Vein *

Proximal Calf Veins

(* superficial vessels)

 

 

 

 

Left Leg:  Internal echoes seen within mid-distal popliteal vein with partial compression of the vein
. Color defect noted.

 

 

Impression

 

There is limited chronic deep vein thrombosis in the left popliteal vein.

## 2022-06-19 NOTE — ED
Extremity Problem HPI





- General


Chief complaint: Extremity Problem,Nontraumatic


Stated complaint: knee pain & swelling


Time Seen by Provider: 06/19/22 13:54


Source: patient


Mode of arrival: ambulatory


Limitations: no limitations





- History of Present Illness


Initial comments: 


Patient is a 61 year-old female with a past medical history of DVT and PE rosa bryant on Xarelto who presents to the emergency department with a chief complaint 

of pain in the back of her left knee.  Patient states symptoms started 5 days 

ago after she got home from the beach.  Patient states she was in the car for 

about 2 hours and walked quite a bit at the beach however did not injure her 

leg.  Patient states she has tried Tylenol, anti-inflammatories and cold 

compress with no relief. States she does have history of DVT on coumadin which 

led to her being switched to Xarelto 2 years ago. States she has not experienced

issues since. Denies chest pain, shortness of breath, and other concerns.  





- Related Data


                                Home Medications











 Medication  Instructions  Recorded  Confirmed


 


Furosemide [Lasix] 40 mg PO QAM 02/05/15 03/29/21


 


Potassium Chloride [Klor-Con 10 ER] 10 meq PO HS 02/05/15 03/29/21


 


metFORMIN HCL [Glucophage] 1,000 mg PO AC-BID 05/01/17 03/29/21


 


rOPINIRole HCL [Requip] 4 mg PO HS 05/03/17 03/29/21


 


Levothyroxine Sodium [Synthroid] 150 mcg PO DAILY 04/13/18 03/29/21


 


Rivaroxaban [Xarelto] 20 mg PO HS 04/13/18 03/29/21


 


traZODone HCL [Desyrel] 150 mg PO HS 04/13/18 03/29/21


 


Omeprazole 20 mg PO HS 03/27/19 03/29/21


 


traMADol HCL [Ultram] 50 mg PO HS 03/27/19 03/29/21


 


Canagliflozin [Invokana] 300 mg PO DAILY 11/30/20 03/29/21


 


Cholecalciferol (Vitamin D3) 125 mcg PO DAILY 11/30/20 03/29/21





[Vitamin D3]   











                                    Allergies











Allergy/AdvReac Type Severity Reaction Status Date / Time


 


latex Allergy  Rash/Hives Verified 06/19/22 13:52


 


sitagliptin [From Januvia] Allergy  Rash/Hives Verified 06/19/22 13:52














Review of Systems


ROS Statement: 


Those systems with pertinent positive or pertinent negative responses have been 

documented in the HPI.





ROS Other: All systems not noted in ROS Statement are negative.





Past Medical History


Past Medical History: Asthma, Blood Disorder, Cancer, Chest Pain / Angina, 

Diabetes Mellitus, Deep Vein Thrombosis (DVT), GERD/Reflux, Hyperlipidemia, 

Pneumonia, Pulmonary Embolus (PE), Thyroid Disorder


Additional Past Medical History / Comment(s): ca-thyroid, PE & DVT 2015,  hx 

colon polyps, not sure what clotting disorder is called.  Enlarged axilla and 

neck lymph nodes


History of Any Multi-Drug Resistant Organisms: None Reported


Past Surgical History: Breast Surgery, Hysterectomy


Additional Past Surgical History / Comment(s): thyroidectomy, spleenectomy, 3 

lumps removed in right breast. large lymph node removed from under the left arm,

COLONOSCOPY, bladder resection


Past Anesthesia/Blood Transfusion Reactions: No Reported Reaction


Past Psychological History: Anxiety, Depression


Smoking Status: Never smoker


Past Alcohol Use History: None Reported


Past Drug Use History: None Reported





- Past Family History


  ** mother


Family Medical History: Cancer


Additional Family Medical History / Comment(s): colon





  ** brother


Family Medical History: Cancer


Additional Family Medical History / Comment(s): liver, kidneys, pancreas, clots





  ** sister


Family Medical History: Cancer


Additional Family Medical History / Comment(s): breast ca, clots





General Exam


Limitations: no limitations


Head exam: Present: atraumatic, normocephalic, normal inspection


Eye exam: Present: normal appearance, PERRL, EOMI.  Absent: scleral icterus, 

conjunctival injection, periorbital swelling


Respiratory exam: Present: normal lung sounds bilaterally.  Absent: respiratory 

distress, wheezes, rales, rhonchi, stridor


Cardiovascular Exam: Present: regular rate, normal rhythm, normal heart sounds. 

Absent: systolic murmur, diastolic murmur, rubs, gallop, clicks


Extremities exam: Present: calf tenderness (left ), other (pain with palpation 

of the proximal popliteal fossa. no overlying erythema or swelling)


Neurological exam: Present: alert, oriented X3, CN II-XII intact


Psychiatric exam: Present: normal affect, normal mood


Skin exam: Present: warm, dry, intact, normal color.  Absent: rash





Course


                                   Vital Signs











  06/19/22





  13:50


 


Temperature 98.3 F


 


Pulse Rate 110 H


 


Respiratory 20





Rate 


 


Blood Pressure 137/75


 


O2 Sat by Pulse 96





Oximetry 














Medical Decision Making





- Medical Decision Making


This is a 61-year-old female with a past medical history significant for DVT and

PE currently on Xarelto presenting with posterior left knee pain.  Thorough 

history and examination were performed.  There is tenderness with palpation of 

the left proximal popliteal fossa.  There is no overlying erythema or edema.  

Positive Homans sign.  Neurovascularly intact. Patient does have history of DVT 

on Coumadin which is why she was switched to Xarelto.  Denies shortness of 

breath and chest pain. 








Venous doppler shows limited chronic deep vein thrombosis in the left popliteal 

vein.  Results discussed with patient.  Patient already on Xarelto.  Further 

anti-coagulation is not needed at this time.  Patient is instructed to follow-up

with her primary care provider in one to 2 days for further evaluation and 

management.  Patient instructed to use elevation, warm compress, and Tylenol for

pain. She verbalizes understanding and is agreeable to this plan. 








Dr. Gu is my attending. 








Disposition


Clinical Impression: 


 Chronic deep vein thrombosis





Disposition: HOME SELF-CARE


Condition: Good


Instructions (If sedation given, give patient instructions):  Deep Vein 

Thrombosis (ED)


Additional Instructions: 


Please continue to take your Xarelto as directed. Follow up with primary care 

provider in 1-2 days. Return to emergency department if you experience new, 

worsening, or concerning symptoms. 


Is patient prescribed a controlled substance at d/c from ED?: No


Referrals: 


ARCELIA العراقي III, MD [Primary Care Provider] - 1-2 days


Time of Disposition: 15:45

## 2022-12-22 ENCOUNTER — HOSPITAL ENCOUNTER (OUTPATIENT)
Dept: HOSPITAL 47 - RADMAMWWP | Age: 62
Discharge: HOME | End: 2022-12-22
Attending: SURGERY
Payer: MEDICARE

## 2022-12-22 DIAGNOSIS — Z12.31: Primary | ICD-10-CM

## 2022-12-22 PROCEDURE — 77067 SCR MAMMO BI INCL CAD: CPT

## 2022-12-22 PROCEDURE — 77063 BREAST TOMOSYNTHESIS BI: CPT

## 2022-12-23 NOTE — MM
Reason for Exam: Screening  (asymptomatic). 

Last mammogram was performed 1 year(s) and 1 month(s) ago. 





Patient History: 

Menarche at age 16. First Full-Term Pregnancy at age 18. Hysterectomy at age 36. Postmenopausal.

Other cancer under age 51. Hormonal Contraceptives for 1 year from age 23 until age 24. 3/29/2021,

Benign Core Biopsy on the right side. 12/4/2020, Benign Core Biopsy on the right side. Benign

Excisional Biopsy.

Sister had breast cancer, age 66. 





Risk Values: 

Josseline 5 year model risk: 3.8%.

NCI Lifetime model risk: 17.1%.





Prior Study Comparison: 

12/4/2020 Right Diagnostic Mammogram, [facility]. 3/8/2021 Right Diagnostic Mammogram, [facility]. 11/2/2021

Bilateral Diagnostic Mammogram, [facility]. 





Tissue Density: 

There are scattered fibroglandular densities.





Findings: 

Analyzed By CAD. 

There are 2 mammotome biopsy clips anteriorly in the right breast redemonstrated. Stable 4 mm oval

circumscribed mass in the subareolar region left breast.



There is no suspicious new group of microcalcifications or new distortion in either breast. 





Overall Assessment: Benign, BI-RAD 2





Management: 

Screening Mammogram of both breasts in 1 year.

A clinical breast exam by your physician is recommended on an annual basis and results should be

correlated with mammographic findings.



Electronically signed and approved by: Suhail Parisi M.D.

## 2023-10-15 NOTE — P.PN
Subjective








56-year-old female with history of multiple venous thromboembolism's wasn't 

being maintained on Xarelto comes in the hospital with progressive worsening of 

breathing difficulty over the last few days.





Over the last 2 weeks patient has been taking lower doses of xarelto 20 

milligrams every other day and a week ago she ran out of her medications due to 

loss of insurance coverage.





Patient was seen in the emergency room underwent a CT angiogram was noted to 

have bilateral pulmonary embolism with some RV collapse.





Patient was started on supplemental oxygen and triaged to the ICU Today patient 

was seen at bedside states that she does not have significant breathing 

difficulty at rest currently on 4-5 L of supplemental oxygen





Denies having headaches blurry vision nausea vomiting diarrhea urinary urgency 

or frequency.





Of note patient recently underwent a left axillary lymph node dissection 

apparently was negative for cancer





Patient was informed that she has factor V Leiden deficiency





5/3/17





Doing well


no new complaints reported


breathing is improved per her


on 2-3 l o2








Objective





- Vital Signs


Vital signs: 


 Vital Signs











Temp  98.1 F   05/03/17 16:00


 


Pulse  85   05/03/17 16:00


 


Resp  18   05/03/17 16:00


 


BP  116/66   05/03/17 16:00


 


Pulse Ox  95   05/03/17 16:00








 Intake & Output











 05/02/17 05/03/17 05/03/17





 18:59 06:59 18:59


 


Intake Total 740 560 180


 


Output Total 1201 401 850


 


Balance -461 159 -670


 


Weight  116.5 kg 


 


Intake:   


 


   60 180


 


    0.9 Normal Saline 240 60 180


 


  Intake, IV Titration 500 500 





  Amount   


 


    Heparin Sodium,Porcine/ 500  





    D5w Pmx 25,000 unit In   





    Dextrose/Water 1 500ml.   





    bag @ 18 UNITS/KG/HR 42.   





    45 mls/hr IV .[id number] [facility]   





    Rx#:[id number]   


 


    Sodium Chloride 0.9% 500  500 





    ml @ 999 mls/hr IV .Q31M   





    ONE Rx#:[id number]   


 


Output:   


 


  Urine 1200 400 850


 


  Stool 1 1 


 


Other:   


 


  Voiding Method Bedpan Bedside Commode Toilet


 


  # Voids  1 2


 


  # Bowel Movements   1














- Constitutional


General appearance: Present: no acute distress





- EENT


Eyes: Present: PERRLA





- Neck


Neck: Present: normal ROM





- Respiratory


Respiratory: bilateral: CTA, negative: dullness, rales, rhonchi





- Cardiovascular


Rhythm: regular


Heart sounds: normal: S1, S2


Abnormal Heart Sounds: Absent: systolic murmur





- Gastrointestinal


General gastrointestinal: Present: normal bowel sounds, soft.  Absent: 

organomegaly





- Neurologic


Neurologic: Present: CNII-XII intact.  Absent: focal deficits





- Psychiatric


Psychiatric: Present: A&O x's 3.  Absent: appropriate affect





- Labs


CBC & Chem 7: 


 05/03/17 04:10





 05/03/17 04:10


Labs: 


 Abnormal Lab Results - Last 24 Hours (Table)











  05/02/17 05/03/17 05/03/17 Range/Units





  21:04 02:14 04:10 


 


WBC     (3.8-10.6)  k/uL


 


RBC     (3.80-5.40)  m/uL


 


MCV     (80.0-100.0)  fL


 


MCHC     (31.0-37.0)  g/dL


 


RDW     (11.5-15.5)  %


 


Eosinophils #     (0-0.7)  k/uL


 


Glucose    127 H  (74-99)  mg/dL


 


POC Glucose (mg/dL)  115 H  120 H   (75-99)  mg/dL














  05/03/17 05/03/17 05/03/17 Range/Units





  04:10 08:05 17:55 


 


WBC  14.6 H    (3.8-10.6)  k/uL


 


RBC  3.69 L    (3.80-5.40)  m/uL


 


MCV  103.2 H    (80.0-100.0)  fL


 


MCHC  30.0 L    (31.0-37.0)  g/dL


 


RDW  16.2 H    (11.5-15.5)  %


 


Eosinophils #  0.8 H    (0-0.7)  k/uL


 


Glucose     (74-99)  mg/dL


 


POC Glucose (mg/dL)   136 H  107 H  (75-99)  mg/dL








 Microbiology - Last 24 Hours (Table)











 05/02/17 06:00 Urine Culture - Final





 Urine,Voided 














Assessment and Plan


Plan: 


#1 recurrent pulmonary embolism bilateral in a patient with factor V Leiden 

deficiency due to noncompliance this is due to loss of insurance





#2 hypothyroidism.  #3 obesity





#4 hypertension





#5 diabetes mellitus type 2 maintained on metformin





#6 hypoxic respiratory failure secondary to #1





#7 leukocytosis is likely reactive in nature





Plan





.  We'll start patient on Xarelto 15 mg by mouth twice a day for 21 days, and 

20mg daily for life, apparently has regained her insurance coverage will send 

for pre-approval 





Titrate down oxygen as tolerated patient will need inpatient care till titrated 

of oxygen at rest at least
Subjective








56-year-old female with history of multiple venous thromboembolism's wasn't 

being maintained on Xarelto comes in the hospital with progressive worsening of 

breathing difficulty over the last few days.





Over the last 2 weeks patient has been taking lower doses of xarelto 20 

milligrams every other day and a week ago she ran out of her medications due to 

loss of insurance coverage.





Patient was seen in the emergency room underwent a CT angiogram was noted to 

have bilateral pulmonary embolism with some RV collapse.





Patient was started on supplemental oxygen and triaged to the ICU Today patient 

was seen at bedside states that she does not have significant breathing 

difficulty at rest currently on 4-5 L of supplemental oxygen





Denies having headaches blurry vision nausea vomiting diarrhea urinary urgency 

or frequency.





Of note patient recently underwent a left axillary lymph node dissection 

apparently was negative for cancer





Patient was informed that she has factor V Leiden deficiency





5/3/17





Doing well


no new complaints reported


breathing is improved per her


on 2-3 l o2





4 2017





Patient is doing well at rest does not require any supplemental oxygen however 

on ambulation of at least 50 feet patient appears to be hypoxic in the low 80





Also appears to get tachycardic at that time.  Denies having headache, blurry 

vision, nausea, vomiting, any bleeding episodes.








Objective





- Vital Signs


Vital signs: 


 Vital Signs











Temp  97.8 F   05/04/17 12:28


 


Pulse  84   05/04/17 12:28


 


Resp  16   05/04/17 12:28


 


BP  110/63   05/04/17 12:28


 


Pulse Ox  95   05/04/17 12:28








 Intake & Output











 05/03/17 05/04/17 05/04/17





 18:59 06:59 18:59


 


Intake Total 180 0 550


 


Output Total 850 351 1


 


Balance -670 -351 549


 


Weight  115.8 kg 


 


Intake:   


 


   0 


 


    0.9 Normal Saline 180 0 


 


  Oral   550


 


Output:   


 


  Urine 850 350 


 


  Stool  1 1


 


Other:   


 


  Voiding Method Toilet Toilet Toilet


 


  # Voids 2 0 2


 


  # Bowel Movements 1  1














- Exam


Physical exam Gen. appearance oriented 3 in no distress





Neck is supple no JVD





Lungs good air entry clear to auscultation no rhonchi or wheezing





Heart S1-S2 heard regular rate and rhythm no murmurs appreciated 





Abdomen is soft nontender no organomegaly bowel sounds are intact





Neurologically cranial nerves II-12 grossly intact no focal motor or sensory 

deficits noted





Skin no abnormalities appreciated








- Labs


CBC & Chem 7: 


 05/03/17 04:10





 05/03/17 04:10


Labs: 


 Abnormal Lab Results - Last 24 Hours (Table)











  05/03/17 05/03/17 05/04/17 Range/Units





  17:55 20:33 07:55 


 


POC Glucose (mg/dL)  107 H  218 H  125 H  (75-99)  mg/dL








 Microbiology - Last 24 Hours (Table)











 05/02/17 06:00 Urine Culture - Final





 Urine,Voided 














Assessment and Plan


Plan: 


#1 recurrent pulmonary embolism bilateral in a patient with factor V Leiden 

deficiency due to noncompliance this is due to loss of insurance





#2 hypothyroidism.  #3 obesity





#4 hypertension





#5 diabetes mellitus type 2 maintained on metformin





#6 hypoxic respiratory failure secondary to #1





#7 leukocytosis is likely reactive in nature





Plan





.  We'll start patient on Xarelto 15 mg by mouth twice a day for 21 days, and 

20mg daily for life, apparently has regained her insurance coverage will send 

for pre-approval patient is improving at rest does not require supplemental 

oxygen however is requiring oxygen with ambulation patient lives alone hence 

will continue titrating down activities encouraged likely discharge the patient 

in the next 24 hours improving at this time
Subjective


Principal diagnosis: 





Acute pulmonary embolism


This is a 56-year-old female with history of hypercoagulable state, patient was 

told in the past that she had a genetic defect, I believe this may have been 

factor V Leyden deficiency.  And she was told by many physicians in the past 

that she needs to be on anticoagulation therapy for the rest of her life.  

Going back to the patient's history, patient had 1 episode of DVT and pulmonary 

embolism after a knee surgery over 13 years ago.  Patient took Coumadin for 6 

months, and she was eventually taken off Coumadin.  Couple years back, patient 

had an episode of unprovoked pulmonary embolism, and she was advised to take 

Xarelto, lifetime.  For some reason patient stopped Xarelto mostly because of 

the cost, and she couldn't afford it.  Insurance company would not cover her 

Xarelto.  Neck to mention the patient had so many issues with Coumadin in the 

past, and she could not keep pro time in the normal range.  Patient presented 

this time with acute shortness of breath, CT of the chest showed bilateral 

pulmonary emboli, with some right ventricular strain, patient was 

hemodynamically stable, and blood pressure was maintained within a normal 

range.  Hence the patient was given heparin, did not require thrombolytic 

therapy.  She was admitted to the intensive care unit, and I saw today on 

consultation.  She seems to be doing better, breathing easier, no cough no 

wheezing no shortness of breath no chest pain.  Patient is also known to have 

history of possible thyroid cancer, she had some cancer tissue removed from her 

neck, but she could not tell me whether it was thyroid or parathyroid, this was 

over 4 years ago.  She also had some recent lymph nodes removed by Dr. Lorenzo 

and she was told they were benign from her axillary region.  Patient states 

that she has strong family history of thromboembolic disease and 

hypercoagulable state.  She believes that it is most likely factor V Leyden 

deficiency.





Patient was reevaluated today on 5/3/2017, she is doing relatively well, no 

shortness of breath, no cough no wheezing, she is hemodynamically stable, had a 

brief episode of low blood pressure last night, responded well to 500 mL of 

fluid bolus only.  She had no shortness of breath no chest pain no cough no 

wheezing and clinically she looked quite well.  Labs today were reviewed.  And 

I plan to transfer the patient out of the ICU to a monitored bed on selective.  

PTT is 22.8, however the patient is off heparin and now she is on Xarelto.





Patient was reevaluated today on 5/4/2017, patient is doing well, she is 

asymptomatic, she is hemodynamically stable, denies any shortness of breath no 

chest pain no cough no wheezing.  Patient is on Xarelto, she could be 

transferred out of the ICU, and possibly today discharge the patient home and 

outpatient follow-up is recommended.








Objective





- Vital Signs


Vital signs: 


 Vital Signs











Temp  97.8 F   05/04/17 12:28


 


Pulse  84   05/04/17 12:28


 


Resp  16   05/04/17 12:28


 


BP  110/63   05/04/17 12:28


 


Pulse Ox  95   05/04/17 12:28








 Intake & Output











 05/03/17 05/04/17 05/04/17





 18:59 06:59 18:59


 


Intake Total 180 0 550


 


Output Total 850 351 1


 


Balance -670 -351 549


 


Weight  115.8 kg 


 


Intake:   


 


   0 


 


    0.9 Normal Saline 180 0 


 


  Oral   550


 


Output:   


 


  Urine 850 350 


 


  Stool  1 1


 


Other:   


 


  Voiding Method Toilet Toilet Toilet


 


  # Voids 2 0 2


 


  # Bowel Movements 1  1














- Exam





Physical Exam: Revealed a 56-year-old in no distress


HEENT:[Neck is supple.] [No neck masses.] [No thyromegaly.] [No JVD.]


Chest: [Clear throughout, no crackles, no rhonchi, no wheezes.]


Cardiac Exam: [Normal S1 and S2, no S3 gallop, no murmur.]


Abdomen: [Soft, nontender,  no megaly, no rebound, no guarding, normal bowel 

sounds.]


Extremities: [No clubbing, no edema, no cyanosis.]


Neurological Exam: [No focal neurologic deficit.]





- Labs


CBC & Chem 7: 


 05/03/17 04:10





 05/03/17 04:10


Labs: 


 Abnormal Lab Results - Last 24 Hours (Table)











  05/03/17 05/03/17 05/04/17 Range/Units





  17:55 20:33 07:55 


 


POC Glucose (mg/dL)  107 H  218 H  125 H  (75-99)  mg/dL








 Microbiology - Last 24 Hours (Table)











 05/02/17 06:00 Urine Culture - Final





 Urine,Voided 














Assessment and Plan


Plan: 





Impression:





1 recurrent bilateral pulmonary embolism in a patient with history of 

hypercoagulable state with factor V Leyden deficiency and noncompliance with 

medication for anticoagulation.





2 history of multiple comorbidities including obesity, type 2 diabetes, 

hypertension, hypothyroidism, and history of recent axillary lymph node excision

, the nature of her lymphadenopathy is not clear to me at this point.  But she 

was told it was benign.





Recommendation: Continue Xarelto, patient is presently on 15 mg by mouth twice 

a day, transfer out of the ICU, consider discharge planning home today.  Follow 

up on outpatient basis.


Time with Patient: Less than 30
Subjective


Principal diagnosis: 





Acute pulmonary embolism


This is a 56-year-old female with history of hypercoagulable state, patient was 

told in the past that she had a genetic defect, I believe this may have been 

factor V Leyden deficiency.  And she was told by many physicians in the past 

that she needs to be on anticoagulation therapy for the rest of her life.  

Going back to the patient's history, patient had 1 episode of DVT and pulmonary 

embolism after a knee surgery over 13 years ago.  Patient took Coumadin for 6 

months, and she was eventually taken off Coumadin.  Couple years back, patient 

had an episode of unprovoked pulmonary embolism, and she was advised to take 

Xarelto, lifetime.  For some reason patient stopped Xarelto mostly because of 

the cost, and she couldn't afford it.  Insurance company would not cover her 

Xarelto.  Neck to mention the patient had so many issues with Coumadin in the 

past, and she could not keep pro time in the normal range.  Patient presented 

this time with acute shortness of breath, CT of the chest showed bilateral 

pulmonary emboli, with some right ventricular strain, patient was 

hemodynamically stable, and blood pressure was maintained within a normal 

range.  Hence the patient was given heparin, did not require thrombolytic 

therapy.  She was admitted to the intensive care unit, and I saw today on 

consultation.  She seems to be doing better, breathing easier, no cough no 

wheezing no shortness of breath no chest pain.  Patient is also known to have 

history of possible thyroid cancer, she had some cancer tissue removed from her 

neck, but she could not tell me whether it was thyroid or parathyroid, this was 

over 4 years ago.  She also had some recent lymph nodes removed by Dr. Lorenzo 

and she was told they were benign from her axillary region.  Patient states 

that she has strong family history of thromboembolic disease and 

hypercoagulable state.  She believes that it is most likely factor V Leyden 

deficiency.





Patient was reevaluated today on 5/30/2017, she is doing relatively well, no 

shortness of breath, no cough no wheezing, she is hemodynamically stable, had a 

brief episode of low blood pressure last night, responded well to 500 mL of 

fluid bolus only.  She had no shortness of breath no chest pain no cough no 

wheezing and clinically she looked quite well.  Labs today were reviewed.  And 

I plan to transfer the patient out of the ICU to a monitored bed on selective.  

PTT is 22.8, however the patient is off heparin and now she is on Xarelto.








Objective





- Vital Signs


Vital signs: 


 Vital Signs











Temp  98.1 F   05/03/17 12:00


 


Pulse  85   05/03/17 12:00


 


Resp  21   05/03/17 12:00


 


BP  104/61   05/03/17 12:00


 


Pulse Ox  93 L  05/03/17 12:00








 Intake & Output











 05/02/17 05/03/17 05/03/17





 18:59 06:59 18:59


 


Intake Total 740 560 100


 


Output Total 1201 401 850


 


Balance -461 159 -750


 


Weight  116.5 kg 


 


Intake:   


 


   60 100


 


    0.9 Normal Saline 240 60 100


 


  Intake, IV Titration 500 500 





  Amount   


 


    Heparin Sodium,Porcine/ 500  





    D5w Pmx 25,000 unit In   





    Dextrose/Water 1 500ml.   





    bag @ 18 UNITS/KG/HR 42.   





    45 mls/hr IV .[id number] [facility]   





    Rx#:[id number]   


 


    Sodium Chloride 0.9% 500  500 





    ml @ 999 mls/hr IV .Q31M   





    ONE Rx#:[id number]   


 


Output:   


 


  Urine 1200 400 850


 


  Stool 1 1 


 


Other:   


 


  Voiding Method Bedpan Bedside Commode Bedside Commode


 


  # Voids  1 














- Exam





Physical Exam: Revealed a 56-year-old in no distress


HEENT:[Neck is supple.] [No neck masses.] [No thyromegaly.] [No JVD.]


Chest: [Clear throughout, no crackles, no rhonchi, no wheezes.]


Cardiac Exam: [Normal S1 and S2, no S3 gallop, no murmur.]


Abdomen: [Soft, nontender,  no megaly, no rebound, no guarding, normal bowel 

sounds.]


Extremities: [No clubbing, no edema, no cyanosis.]


Neurological Exam: [No focal neurologic deficit.]





- Labs


CBC & Chem 7: 


 05/03/17 04:10





 05/03/17 04:10


Labs: 


 Abnormal Lab Results - Last 24 Hours (Table)











  05/02/17 05/02/17 05/03/17 Range/Units





  16:50 21:04 02:14 


 


WBC     (3.8-10.6)  k/uL


 


RBC     (3.80-5.40)  m/uL


 


MCV     (80.0-100.0)  fL


 


MCHC     (31.0-37.0)  g/dL


 


RDW     (11.5-15.5)  %


 


Eosinophils #     (0-0.7)  k/uL


 


Glucose     (74-99)  mg/dL


 


POC Glucose (mg/dL)  105 H  115 H  120 H  (75-99)  mg/dL














  05/03/17 05/03/17 05/03/17 Range/Units





  04:10 04:10 08:05 


 


WBC   14.6 H   (3.8-10.6)  k/uL


 


RBC   3.69 L   (3.80-5.40)  m/uL


 


MCV   103.2 H   (80.0-100.0)  fL


 


MCHC   30.0 L   (31.0-37.0)  g/dL


 


RDW   16.2 H   (11.5-15.5)  %


 


Eosinophils #   0.8 H   (0-0.7)  k/uL


 


Glucose  127 H    (74-99)  mg/dL


 


POC Glucose (mg/dL)    136 H  (75-99)  mg/dL








 Microbiology - Last 24 Hours (Table)











 05/02/17 06:00 Urine Culture - Preliminary





 Urine,Voided 














Assessment and Plan


Plan: 





Impression:





1 recurrent bilateral pulmonary embolism in a patient with history of 

hypercoagulable state with factor V Leyden deficiency and noncompliance with 

medication for anticoagulation.





2 history of multiple comorbidities including obesity, type 2 diabetes, 

hypertension, hypothyroidism, and history of recent axillary lymph node excision

, the nature of her lymphadenopathy is not clear to me at this point.  But she 

was told it was benign.





Recommendation: Continue Xarelto, patient is presently on 15 mg by mouth twice 

a day, transfer out of the ICU, and discharge planning in the next 24 hours.


Time with Patient: Less than 30
Subjective


Principal diagnosis: 





Pulmonary embolism





This is a 56-year-old female with history of hypercoagulable state, patient was 

told in the past that she had a genetic defect, I believe this may have been 

factor V Leyden deficiency.  And she was told by many physicians in the past 

that she needs to be on anticoagulation therapy for the rest of her life.  

Going back to the patient's history, patient had 1 episode of DVT and pulmonary 

embolism after a knee surgery over 13 years ago.  Patient took Coumadin for 6 

months, and she was eventually taken off Coumadin.  Couple years back, patient 

had an episode of unprovoked pulmonary embolism, and she was advised to take 

Xarelto, lifetime.  For some reason patient stopped Xarelto mostly because of 

the cost, and she couldn't afford it.  Insurance company would not cover her 

Xarelto.  Neck to mention the patient had so many issues with Coumadin in the 

past, and she could not keep pro time in the normal range.  Patient presented 

this time with acute shortness of breath, CT of the chest showed bilateral 

pulmonary emboli, with some right ventricular strain, patient was 

hemodynamically stable, and blood pressure was maintained within a normal 

range.  Hence the patient was given heparin, did not require thrombolytic 

therapy.  She was admitted to the intensive care unit, and I saw today on 

consultation.  She seems to be doing better, breathing easier, no cough no 

wheezing no shortness of breath no chest pain.  Patient is also known to have 

history of possible thyroid cancer, she had some cancer tissue removed from her 

neck, but she could not tell me whether it was thyroid or parathyroid, this was 

over 4 years ago.  She also had some recent lymph nodes removed by Dr. Lorenzo 

and she was told they were benign from her axillary region.  Patient states 

that she has strong family history of thromboembolic disease and 

hypercoagulable state.  She believes that it is most likely factor V Leyden 

deficiency.





Patient was reevaluated today on 5/3/2017, she is doing relatively well, no 

shortness of breath, no cough no wheezing, she is hemodynamically stable, had a 

brief episode of low blood pressure last night, responded well to 500 mL of 

fluid bolus only.  She had no shortness of breath no chest pain no cough no 

wheezing and clinically she looked quite well.  Labs today were reviewed.  And 

I plan to transfer the patient out of the ICU to a monitored bed on selective.  

PTT is 22.8, however the patient is off heparin and now she is on Xarelto.





Patient was reevaluated today on 5/4/2017, patient is doing well, she is 

asymptomatic, she is hemodynamically stable, denies any shortness of breath no 

chest pain no cough no wheezing.  Patient is on Xarelto, she could be 

transferred out of the ICU, and possibly today discharge the patient home and 

outpatient follow-up is recommended.





The patient was seen again today 05/05/2017 in follow-up on the regular medical 

floor.  She is awake and alert in no acute distress.  He is quite anxious to go 

home.  She denies any worsening shortness of breath, cough or congestion.  He 

is maintaining good O2 saturations in the upper 90s on room air.  She's been 

initiated on Xarelto.





Objective





- Vital Signs


Vital signs: 


 Vital Signs











Temp  97.0 F L  05/05/17 07:00


 


Pulse  85   05/05/17 08:00


 


Resp  22   05/05/17 08:00


 


BP  146/80   05/05/17 07:00


 


Pulse Ox  94 L  05/05/17 08:07








 Intake & Output











 05/04/17 05/05/17 05/05/17





 18:59 06:59 18:59


 


Intake Total 550  480


 


Output Total 1  1


 


Balance 549  479


 


Intake:   


 


  Oral 550  480


 


Output:   


 


  Stool 1  1


 


Other:   


 


  Voiding Method Toilet  Toilet


 


  # Voids 2 1 


 


  # Bowel Movements 1 0 














- Exam





GENERAL EXAM: Alert, active, comfortable in no apparent distress.


HEAD: Normocephalic.


EYES: Normal reaction of pupils, equal size.


NOSE: Clear with pink turbinates.


THROAT: No erythema or exudates.


NECK: No masses, no JVD.


CHEST: No chest wall deformity.


LUNGS: Equal air entry with no crackles, wheeze, rhonchi or dullness.


CVS: S1 and S2 normal with no audible mumurs, regular rhythm.


ABDOMEN: No hepatosplenomegaly, normal bowel sounds, no guarding or rigidity.


SPINE: No scoliosis or deformity


SKIN: No rashes


CENTRAL NERVOUS SYSTEM: No focal deficits, tone is normal in all 4 extremities.


Extremities: There is no significant peripheral edema.  No clubbing, no 

cyanosis.  Peripheral pulses are intact.





- Labs


CBC & Chem 7: 


 05/03/17 04:10





 05/03/17 04:10


Labs: 


 Abnormal Lab Results - Last 24 Hours (Table)











  05/04/17 05/04/17 05/05/17 Range/Units





  17:25 21:02 07:21 


 


POC Glucose (mg/dL)  123 H  170 H  130 H  (75-99)  mg/dL














  05/05/17 Range/Units





  12:10 


 


POC Glucose (mg/dL)  106 H  (75-99)  mg/dL














Assessment and Plan


Plan: 





Impression:





#1 Recurrent bilateral pulmonary embolism and a patient with a history of 

hypercoagulable state with factor V Leaton deficiency and noncompliance of 

medications for anticoagulation secondary to cost.





#2 Morbid obesity.





#3 Diabetes mellitus, type II.





#4 Hypertension.





#5 Hypothyroidism.





Plan:





The patient was seen and evaluated by Dr. Westfall.  She is cleared for discharge 

from the pulmonary standpoint.  She is covered now for her Xarelto and is to 

remain on it lifelong.  She could follow-up in our office in 1-2 weeks' time.  

She is encouraged to call sooner with any recurrence of symptoms or other 

questions or concerns.
181

## 2024-09-17 ENCOUNTER — HOSPITAL ENCOUNTER (OUTPATIENT)
Dept: HOSPITAL 47 - RADBDWWP | Age: 64
Discharge: HOME | End: 2024-09-17
Attending: STUDENT IN AN ORGANIZED HEALTH CARE EDUCATION/TRAINING PROGRAM
Payer: MEDICARE

## 2024-09-17 ENCOUNTER — HOSPITAL ENCOUNTER (OUTPATIENT)
Dept: HOSPITAL 47 - RADMAMWWP | Age: 64
Discharge: HOME | End: 2024-09-17
Attending: STUDENT IN AN ORGANIZED HEALTH CARE EDUCATION/TRAINING PROGRAM
Payer: MEDICARE

## 2024-09-17 PROCEDURE — 77067 SCR MAMMO BI INCL CAD: CPT

## 2024-09-17 PROCEDURE — 77063 BREAST TOMOSYNTHESIS BI: CPT

## 2024-09-17 PROCEDURE — 77080 DXA BONE DENSITY AXIAL: CPT

## 2024-09-17 NOTE — BD
EXAMINATION TYPE: Axial Bone Density

 

DATE OF EXAM: 9/17/2024

 

CLINICAL HISTORY: 64 years old Female.  ICD-10 CODE: Z78.0 Postmenopausal

 

Height:  63

Weight:  213

 

FRAX RISK QUESTIONS:

 

Alcohol (3 or more units per day):  no

Family History (Parent hip fracture):  no

Glucocorticoids (More than 3mos):  no

           (Ex: prednisone, prednisolone, methylprednisolone, dexamethasone, and hydrocortisone).    
     

History of Fracture in Adulthood: no

Secondary Osteoporosis:

  1.  Type 1 Diabetes: no

  2.  Hyperthyroidism: no

  3.  Menopause before 45: yes

  4.  Malnutrition: no

  5.  Chronic liver disease: no

Rheumatoid Arthritis: no

Current Tobacco Use: no

 

RISK FACTORS 

 

HISTORY OF: 

Surgery to Spine/Hip(right/left)/Wrist (right/left): no

 

MEDICATIONS: 

Thyroid Medications:  synthroid

How Long: since age 36

 

EXAM MEASUREMENTS: 

Bone mineral densitometry was performed using the [employer].

Bone mineral density as measured about the Lumbar spine is:  

----- L1-L4(G/cm2): 1.240

T Score Values are as follows:

----- L1: 0.5

----- L2: -0.6

----- L3: 0.8

----- L4: 0.9

----- L1-L4: 0.5

 

Z Score Values are as follows:

----- L1: 0.9

----- L2: -0.2

----- L3: 1.2

----- L4: 1.3

----- L1-L4: 0.9

 

Bone mineral density : baseline

 

Bone mineral density about the R hip (g/cm2): 0.879

Bone mineral density about the L hip (g/cm2): 0.875

T Score values are as follows:

-----R Neck: -2.1

-----L Neck: -2.0

-----R Total: -1.0

-----L Total: -1.1

 

Z Score values are as follows:

-----R Neck: -1.4

-----L Neck: -1.3

-----R Total: -0.7

-----L Total: -0.7

 

Bone mineral density : baseline

 

 

FRAX%s: The graph provided illustrates a 9.9% chance for a major osteoporotic fx and a 1.4% chance fo
r the hips probability for fx in 10 years time.

 

 

 

 

IMPRESSION:

Osteopenia (T Score between -2.5 and -1).

 

There is slightly increased risk of fracture and the patient may be considered 

for treatment. 

 

Re-Screen 2-5 years.

 

NOTE:  T-SCORE=SD OF THE YOUNG ADULT MEAN.

 

 

 

 

 

 

[facility], Workstation: [id number], 9/17/2024 11:13 PM

## 2024-09-18 NOTE — MM
Reason for Exam: Screening  (asymptomatic). 

Last mammogram was performed 1 year(s) and 9 month(s) ago. 





Patient History: 

Menarche at age 16. First Full-Term Pregnancy at age 18. Hysterectomy at age 36. Postmenopausal.

Other cancer under age 50. Hormonal Contraceptives for 1 year from age 23 until age 24. 3/29/2021,

Benign Core Biopsy on the right side. 12/4/2020, Benign Core Biopsy on the right side. Benign

Excisional Biopsy.

Sister had breast cancer, age 66. 





Risk Values: 

Josseline 5 year model risk: 4.0%.

NCI Lifetime model risk: 16.2%.





Prior Study Comparison: 

3/8/2021 Right Diagnostic Mammogram, [facility]. 11/2/2021 Bilateral Diagnostic Mammogram, [facility]. 12/22/2022

Bilateral MG 3D screening mammo w/cad, [facility]. 





Tissue Density: 

The breasts are almost entirely fatty.





Findings: 

Analyzed By CAD. 

Right breast: There is no suspicious group of microcalcifications or new suspicious mass.



Left breast: There is no suspicious group of microcalcifications or new suspicious mass. 





Overall Assessment: Negative, BI-RAD 1





Management: 

Screening Mammogram of both breasts in 1 year.

[facility] will attempt to contact patient to return for supplemental views and

ultrasound if indicated.



Patient should continue monthly self-breast exams.  A clinical breast exam by your physician is

recommended on an annual basis.

This exam should not preclude additional follow-up of suspicious palpable abnormalities.



Note on Josseline scores and lifetime risk:

1. A Josseline score greater than 3% is considered moderate risk. If this is the case, consider

specialist referral to assess eligibility for a risk reducing agent.

2. If overall lifetime risk for the development of breast cancer is 20% or higher, the patient may

qualify for future screening with alternating mammogram and breast MRI.



[facility], Workstation: [id number], 9/18/2024 1:22 PM.



Electronically signed and approved by: Joseph Shipman DO

## 2025-01-10 ENCOUNTER — HOSPITAL ENCOUNTER (EMERGENCY)
Dept: HOSPITAL 47 - EC | Age: 65
Discharge: HOME | End: 2025-01-10
Payer: MEDICARE

## 2025-01-10 VITALS — HEART RATE: 79 BPM | RESPIRATION RATE: 16 BRPM | DIASTOLIC BLOOD PRESSURE: 71 MMHG | SYSTOLIC BLOOD PRESSURE: 128 MMHG

## 2025-01-10 VITALS — TEMPERATURE: 97.8 F

## 2025-01-10 DIAGNOSIS — S09.90XA: ICD-10-CM

## 2025-01-10 DIAGNOSIS — S42.211A: Primary | ICD-10-CM

## 2025-01-10 DIAGNOSIS — Z88.8: ICD-10-CM

## 2025-01-10 DIAGNOSIS — Z91.040: ICD-10-CM
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
## 2025-01-10 PROCEDURE — 73060 X-RAY EXAM OF HUMERUS: CPT

## 2025-01-10 PROCEDURE — 96372 THER/PROPH/DIAG INJ SC/IM: CPT

## 2025-01-10 PROCEDURE — 72125 CT NECK SPINE W/O DYE: CPT

## 2025-01-10 PROCEDURE — 73030 X-RAY EXAM OF SHOULDER: CPT

## 2025-01-10 PROCEDURE — 99284 EMERGENCY DEPT VISIT MOD MDM: CPT

## 2025-01-10 PROCEDURE — 70450 CT HEAD/BRAIN W/O DYE: CPT

## 2025-01-10 RX ADMIN — ACETAMINOPHEN AND CODEINE PHOSPHATE STA EACH: 300; 30 TABLET ORAL at 22:05

## 2025-01-10 RX ADMIN — MORPHINE SULFATE STA MG: 4 INJECTION, SOLUTION INTRAMUSCULAR; INTRAVENOUS at 21:18

## 2025-01-10 NOTE — CT
EXAMINATION TYPE: CT brain rut wo con

 

DATE OF EXAM: 1/10/2025 8:37 PM

 

COMPARISON: None. 

 

CLINICAL INDICATION: Female, 64 years old with history of head injury +BT, Code COAG: Fall in thinner
s

 

TECHNIQUE: CT of the brain is performed utilizing 3 mm thick sections through the posterior fossa and
 3 mm thick sections through the remaining calvarium.  Study is performed within 24 hours of arrival 
to the hospital.

Contrast used: mL of , (none if empty)

CT DLP: 1420.5 mGycm, Automated exposure control for dose reduction was used.

 

FINDINGS:

No abnormal hyperdensity is present to suggest an acute intracranial hemorrhage.

No mass lesion is evident.

No acute infarcts are evident.

Ventricles and sulci are appropriate for the patient age.  

 

Paranasal sinuses and mastoid air cells within the field-of-view are clear. 

 

IMPRESSIONS:

1. No acute intracranial process. Follow-up MRI can be performed as clinically indicated.

 

==============================================================

CT cervical spine.

 

COMPARISON: None

 

TECHNIQUE: CT of the cervical spine is performed in the axial plane at 2 mm thick sections.  Reconstr
ucted images in the coronal, and sagittal plane are reviewed on the computer. 

 

FINDINGS:

No acute fractures are evident.

Vertebral body alignment is normal.

Mild disc space narrowing is present at C5-6 C6-7 C7-T1. Anterior vertebral body spurring is present 
C4-C6.

Vertebral body heights are preserved.

No spinal canal stenosis is evident. Small right paracentral endplate spurring is noted at superior e
ndplate C7 without stenosis. Some central endplate spurring is present superior endplate C6.

No neural foraminal stenosis is evident. 

 

IMPRESSION:

1. No acute osseous abnormality cervical spine.

2. Chronic changes discussed above.

 

[facility], Workstation: [id number], 1/10/2025 8:58 PM

## 2025-01-10 NOTE — XR
EXAMINATION TYPE: XR humerus RT

 

DATE OF EXAM: 1/10/2025 9:06 PM

 

COMPARISON: Shoulder x-ray same date 

 

CLINICAL INDICATION: Female, 64 years old with history of fall, pain

 

TECHNIQUE: 2 view(s) obtained.

 

FINDINGS: 

Suspected surgical neck humerus fracture not as well-visualized but remains suspicious. Avulsion of t
he greater tuberosity well visualized.

 

The remaining portions of the humerus are intact. Elbow joint space is visualized appears unremarkabl
e. Acromioclavicular junction and glenohumeral junction appear normal.

 

IMPRESSION:

1.  Suspected occult fracture of the surgical neck right humerus.

2. Avulsion of the greater tuberosity right shoulder

 

[facility], Workstation: [id number], 1/10/2025 9:10 PM

## 2025-01-10 NOTE — ED
Fall HPI





- General


Chief Complaint: Fall


Stated Complaint: Fall-R Arm Injury-Thinners


Time Seen by Provider: 01/10/25 20:22


Source: patient


Mode of arrival: ambulatory





- History of Present Illness


Initial Comments: 


64-year-old female presenting for evaluation post fall.  Patient was taking her 

[employer] outside when the dog pulled on its leash causing her to fall 

forward and hit her head on the ground.  She had no loss of consciousness.  She 

does take Xarelto due to history of DVTs.  She is not having any nausea, 

vomiting, neck pain, vision or hearing changes, numbness, tingling, weakness.  

She is having severe pain to the right shoulder.  Pain travels from the shoulder

down to the level of the elbow.  No pain from the elbow to the fingertips.  Pain

is worse with movement.








- Related Data


                                Home Medications











 Medication  Instructions  Recorded  Confirmed


 


Furosemide [Lasix] 40 mg PO QAM 02/05/15 03/29/21


 


Potassium Chloride [Klor-Con 10 ER] 10 meq PO HS 02/05/15 03/29/21


 


metFORMIN HCL [Glucophage] 1,000 mg PO AC-BID 05/01/17 03/29/21


 


rOPINIRole HCL [Requip] 4 mg PO HS 05/03/17 03/29/21


 


Levothyroxine Sodium [Synthroid] 150 mcg PO DAILY 04/13/18 03/29/21


 


Rivaroxaban [Xarelto] 20 mg PO HS 04/13/18 03/29/21


 


traZODone HCL [Desyrel] 150 mg PO HS 04/13/18 03/29/21


 


Omeprazole 20 mg PO HS 03/27/19 03/29/21


 


traMADol HCL [Ultram] 50 mg PO HS 03/27/19 03/29/21


 


Canagliflozin [Invokana] 300 mg PO DAILY 11/30/20 03/29/21


 


Cholecalciferol (Vitamin D3) 125 mcg PO DAILY 11/30/20 03/29/21





[Vitamin D3]   








                                  Previous Rx's











 Medication  Instructions  Recorded


 


HYDROcodone/APAP 7.5-325MG [Norco 1 tab PO Q4H PRN 3 Days #18 tab 01/10/25





7.5-325]  











                                    Allergies











Allergy/AdvReac Type Severity Reaction Status Date / Time


 


latex Allergy  Rash/Hives Verified 01/10/25 20:21


 


sitagliptin [From Januvia] Allergy  Rash/Hives Verified 01/10/25 20:21














Review of Systems


ROS Statement: 


Those systems with pertinent positive or pertinent negative responses have been 

documented in the HPI.





ROS Other: All systems not noted in ROS Statement are negative.





Past Medical History


Past Medical History: Asthma, Blood Disorder, Cancer, Chest Pain / Angina, 

Diabetes Mellitus, Deep Vein Thrombosis (DVT), GERD/Reflux, Hyperlipidemia, 

Pneumonia, Pulmonary Embolus (PE), Thyroid Disorder


Additional Past Medical History / Comment(s): ca-thyroid, PE & DVT 2015,  hx 

colon polyps, not sure what clotting disorder is called.  Enlarged axilla and 

neck lymph nodes


History of Any Multi-Drug Resistant Organisms: None Reported


Past Surgical History: Breast Surgery, Hysterectomy


Additional Past Surgical History / Comment(s): thyroidectomy, spleenectomy, 3 

lumps removed in right breast. large lymph node removed from under the left arm,

COLONOSCOPY, bladder resection


Past Anesthesia/Blood Transfusion Reactions: No Reported Reaction


Past Psychological History: Anxiety, Depression


Smoking Status: Never smoker


Past Alcohol Use History: None Reported


Past Drug Use History: None Reported





- Past Family History


  ** mother


Family Medical History: Cancer


Additional Family Medical History / Comment(s): colon





  ** brother


Family Medical History: Cancer


Additional Family Medical History / Comment(s): liver, kidneys, pancreas, clots





  ** sister


Family Medical History: Cancer


Additional Family Medical History / Comment(s): breast ca, clots





General Exam


Limitations: no limitations


General appearance: alert, in no apparent distress


Head exam: Present: atraumatic, normocephalic, normal inspection


Eye exam: Present: normal appearance, PERRL, EOMI


Neck exam: Present: normal inspection.  Absent: meningismus


Respiratory exam: Present: normal lung sounds bilaterally.  Absent: respiratory 

distress, wheezes, rales, rhonchi, stridor


Cardiovascular Exam: Present: regular rate, normal rhythm, normal heart sounds. 

Absent: systolic murmur, diastolic murmur, rubs, gallop, clicks


  ** Right


Shoulder Exam: Present: normal inspection, tenderness.  Absent: full ROM


Vascular: Absent: vascular compromise


Neurological exam: Present: alert, oriented X3


Psychiatric exam: Present: normal affect, normal mood


Skin exam: Present: warm, dry





Course





                                   Vital Signs











  01/10/25 01/10/25





  20:16 21:15


 


Temperature 97.8 F 


 


Pulse Rate 84 83


 


Respiratory 17 18





Rate  


 


Blood Pressure 149/73 128/76


 


O2 Sat by Pulse 98 97





Oximetry  














Medical Decision Making





- Medical Decision Making


Was pt. sent in by a medical professional or institution (, PA, NP, urgent 

care, hospital, or nursing home...) When possible be specific


@  -No


Did you speak to anyone other than the patient for history (EMS, parent, family,

police, friend...)? What history was obtained from this source 


@  -No


Did you review nursing and triage notes (agree or disagree)?  Why? 


@  -I reviewed and agree with nursing and triage notes


Were old charts reviewed (outside hosp., previous admission, EMS record, old 

EKG, old radiological studies, urgent care reports/EKG's, nursing home records)?

Report findings 


@  -No old charts were reviewed


Differential Diagnosis (chest pain, altered mental status, abdominal pain women,

abdominal pain men, vaginal bleeding, weakness, fever, dyspnea, syncope, 

headache, dizziness, GI bleed, back pain, seizure, CVA, palpatations, mental 

health, musculoskeletal)? 


@  -Differential includes uncomplicated head injury, concussion, fracture, 

hemorrhage, this is not an all-inclusive list


EKG interpreted by me (3pts min.).


@  -As above


X-rays interpreted by me (1pt min.).


@  -X-ray shows suspected surgical neck fracture of the right humerus.  There is

avulsion of the greater tuberosity.


CT interpreted by me (1pt min.).


@  -CT shows no acute intracranial process.  No acute osseous abnormality of the

cervical spine.  There are chronic changes.


U/S interpreted by me (1pt. min.).


@  -None done


What testing was considered but not performed or refused? (CT, X-rays, U/S, 

labs)? Why?


@  -None


What meds were considered but not given or refused? Why?


@  -None


Did you discuss the management of the patient with other professionals 

(professionals i.e. , PA, NP, lab, RT, psych nurse, , , 

teacher, , )? Give summary


@  -No


Was smoking cessation discussed for >3mins.?


@  -No


Was critical care preformed (if so, how long)?


@  -No


Were there social determinants of health that impacted care today? How? 

(Homelessness, low income, unemployed, alcoholism, drug addiction, 

transportation, low edu. Level, literacy, decrease access to med. care, residential, 

rehab)?


@  -No


Was there de-escalation of care discussed even if they declined (Discuss DNR or 

withdrawal of care, Hospice)? DNR status


@  -No


What co-morbidities impacted this encounter? (DM, HTN, Smoking, COPD, CAD, 

Cancer, CVA, ARF, Chemo, Hep., AIDS, mental health diagnosis, sleep apnea, 

morbid obesity)?


@  -None


Was patient admitted / discharged? Hospital course, mention meds given and 

route, prescriptions, significant lab abnormalities, going to OR and other 

pertinent info.


@  -64-year-old female presenting for evaluation post fall.  Just prior to 

arrival her dog pulled on its leash causing her to fall forward hitting her 

head.  She takes Xarelto due to history of DVTs, no loss of consciousness.  Code

coag was called.  CT of the brain and cervical spine shows no acute intracranial

process or cervical spine fracture.  Patient does have a surgical neck fracture 

of the right humerus.  There is avulsion of the greater tuberosity.  Patient is 

placed in an arm sling.  She is provided with pain medication.  She is educated 

on today's findings and management at home.  She is instructed to follow-up with

orthopedics, call the office on Monday.  [facility] past and will call them.  Follow-up with PCP.  Report back to 

ER with any new or worsening symptoms.  Discussed return parameters and answered

all questions.  Patient conveyed verbal understanding and agreed to the plan.  I

discussed this case in detail with my attending Dr. Danielle


Undiagnosed new problem with uncertain prognosis?


@  -No


Drug Therapy requiring intensive monitoring for toxicity (Heparin, Nitro, In

sulin, Cardizem)?


@  -No


Were any procedures done?


@  -No


Diagnosis/symptom?


@  -Head injury, 


Acute, or Chronic, or Acute on Chronic?


@  -Acute


Uncomplicated (without systemic symptoms) or Complicated (systemic symptoms)?


@  -Uncomplicated


Side effects of treatment?


@  -No


Exacerbation, Progression, or Severe Exacerbation?


@  -No


Poses a threat to life or bodily function? How? (Chest pain, USA, MI, pneumonia,

PE, COPD, DKA, ARF, appy, cholecystitis, CVA, Diverticulitis, Homicidal, 

Suicidal, threat to staff... and all critical care pts)


@  -Unlikely, educated on the signs of a slow brain bleed


Diagnosis/symptom?


@Surgical neck fracture of the right humerus


Acute, or Chronic, or Acute on Chronic?


@Acute


Uncomplicated (without systemic symptoms) or Complicated (systemic symptoms)?


@Complicated


Side effects of treatment?


@None


Exacerbation, Progression, or Severe Exacerbation]


@No


Poses a threat to life or bodily function?


@Threat to regaining function if patient does not adhere to proper follow-up














Disposition


Clinical Impression: 


 Head injury, Humeral surgical neck fracture





Disposition: HOME SELF-CARE


Condition: Good


Instructions (If sedation given, give patient instructions):  Head Injury (ED), 

Proximal Humerus Fracture (ED)


Additional Instructions: 


Follow-up with orthopedics.  Report back to ER with any new or worsening 

symptoms.  Keep your sling on.  Take Motrin and Tylenol as needed, for severe 

pain you may take Norco, do not combine Norco with over-the-counter Tylenol.  

Rest and ice the shoulder.


Prescriptions: 


HYDROcodone/APAP 7.5-325MG [Norco 7.5-325] 1 tab PO Q4H PRN 3 Days #18 tab


 PRN Reason: Pain


Is patient prescribed a controlled substance at d/c from ED?: Yes


When asked, does pt state using other controlled substances?: No


If prescribed controlled substance>3 days was MAPS reviewed?: Prescribed <3 Days


If opioid is for acute pain is fill amount 7 days or less?: Yes


Referrals: 


Etienne Tucker MD [Primary Care Provider] - 1-2 days


Dakota Torrez MD [STAFF PHYSICIAN] - 1-2 days


Time of Disposition: 21:47

## 2025-01-10 NOTE — XR
EXAMINATION TYPE: XR shoulder complete RT

 

DATE OF EXAM: 1/10/2025 8:58 PM

 

COMPARISON: None. 

 

CLINICAL INDICATION: Female, 64 years old with history of fall,

 

TECHNIQUE: XR shoulder complete RT view(s) obtained.

 

FINDINGS: 

The humeral head articulates with the glenoid.

The acromio-clavicular junction is normal.

There is avulsion of the greater tuberosity. Surgical neck humeral fracture may be present.

 

A follow up study can be performed 7-10 days from acute trauma for continued pain.  MRI can be perfor
med if soft tissue evaluation would be of benefit.

 

IMPRESSION:

1. Suspected surgical neck of the right humerus fracture. There is avulsion of the greater tuberosity
. 

 

[facility], Workstation: [id number], 1/10/2025 9:01 PM

## 2025-03-13 ENCOUNTER — HOSPITAL ENCOUNTER (OUTPATIENT)
Dept: HOSPITAL 47 - RADXRMAIN | Age: 65
Discharge: HOME | End: 2025-03-13
Attending: INTERNAL MEDICINE
Payer: MEDICARE

## 2025-03-13 DIAGNOSIS — R05.9: Primary | ICD-10-CM

## 2025-03-13 PROCEDURE — 71046 X-RAY EXAM CHEST 2 VIEWS: CPT

## 2025-03-13 NOTE — XR
EXAMINATION TYPE: XR chest 2V

 

DATE OF EXAM: 3/13/2025

 

CLINICAL INDICATION: Female, 64 years old with history of R05.9 COUGH, 

 

TECHNIQUE:  Frontal and lateral views of the chest are obtained.

 

COMPARISON: Chest x-ray January 22, 2020

 

FINDINGS:  There is no focal air space opacity, pleural effusion, or pneumothorax seen.  The cardiac 
silhouette size is within normal limits. Degenerative spurring in the spine is seen.

 

IMPRESSION:  No acute pulmonary infiltrate.

 

[facility], Workstation: [id number], 3/13/2025 9:43 AM

## 2025-05-05 ENCOUNTER — HOSPITAL ENCOUNTER (OUTPATIENT)
Dept: HOSPITAL 47 - LABWHC1 | Age: 65
Discharge: HOME | End: 2025-05-05
Attending: INTERNAL MEDICINE
Payer: MEDICARE

## 2025-05-05 DIAGNOSIS — E11.65: Primary | ICD-10-CM

## 2025-05-05 PROCEDURE — 36415 COLL VENOUS BLD VENIPUNCTURE: CPT

## 2025-05-05 PROCEDURE — 83036 HEMOGLOBIN GLYCOSYLATED A1C: CPT
